# Patient Record
Sex: MALE | Race: WHITE | NOT HISPANIC OR LATINO | Employment: FULL TIME | URBAN - METROPOLITAN AREA
[De-identification: names, ages, dates, MRNs, and addresses within clinical notes are randomized per-mention and may not be internally consistent; named-entity substitution may affect disease eponyms.]

---

## 2017-02-23 RX ORDER — ALPRAZOLAM 1 MG/1
1 TABLET ORAL DAILY PRN
Qty: 30 TABLET | Refills: 0 | Status: SHIPPED | OUTPATIENT
Start: 2017-02-23 | End: 2017-05-19 | Stop reason: SDUPTHER

## 2017-02-23 NOTE — TELEPHONE ENCOUNTER
----- Message from Shadia San sent at 2/23/2017  4:07 PM CST -----  Would like a refill on xanax. Pt uses.  [facility] [address] [place] [address]  Phone: [phone] Fax: [phone]     Please call back at [phone]. Thanks//cdb

## 2017-03-06 ENCOUNTER — OFFICE VISIT (OUTPATIENT)
Dept: FAMILY MEDICINE | Facility: CLINIC | Age: 42
End: 2017-03-06
Payer: COMMERCIAL

## 2017-03-06 VITALS
DIASTOLIC BLOOD PRESSURE: 90 MMHG | WEIGHT: 282.19 LBS | OXYGEN SATURATION: 96 % | TEMPERATURE: 97 F | HEIGHT: 66 IN | SYSTOLIC BLOOD PRESSURE: 134 MMHG | BODY MASS INDEX: 45.35 KG/M2 | RESPIRATION RATE: 18 BRPM | HEART RATE: 116 BPM

## 2017-03-06 DIAGNOSIS — J32.9 SINUSITIS, UNSPECIFIED CHRONICITY, UNSPECIFIED LOCATION: Primary | ICD-10-CM

## 2017-03-06 DIAGNOSIS — R29.898 ARM WEAKNESS: ICD-10-CM

## 2017-03-06 PROCEDURE — 1160F RVW MEDS BY RX/DR IN RCRD: CPT | Mod: S$GLB,,, | Performed by: INTERNAL MEDICINE

## 2017-03-06 PROCEDURE — 99999 PR PBB SHADOW E&M-EST. PATIENT-LVL III: CPT | Mod: PBBFAC,,, | Performed by: INTERNAL MEDICINE

## 2017-03-06 PROCEDURE — 99213 OFFICE O/P EST LOW 20 MIN: CPT | Mod: S$GLB,,, | Performed by: INTERNAL MEDICINE

## 2017-03-06 RX ORDER — OSELTAMIVIR PHOSPHATE 75 MG/1
75 CAPSULE ORAL 2 TIMES DAILY
Qty: 10 CAPSULE | Refills: 0 | Status: SHIPPED | OUTPATIENT
Start: 2017-03-06 | End: 2017-03-11

## 2017-03-06 RX ORDER — DOXYCYCLINE 100 MG/1
100 CAPSULE ORAL 2 TIMES DAILY
Qty: 20 CAPSULE | Refills: 0 | Status: SHIPPED | OUTPATIENT
Start: 2017-03-06 | End: 2017-06-05

## 2017-03-06 RX ORDER — CYCLOBENZAPRINE HCL 10 MG
10 TABLET ORAL NIGHTLY
Qty: 30 TABLET | Refills: 0 | Status: SHIPPED | OUTPATIENT
Start: 2017-03-06 | End: 2018-01-28

## 2017-03-06 RX ORDER — CYCLOBENZAPRINE HCL 10 MG
10 TABLET ORAL NIGHTLY
Qty: 30 TABLET | Refills: 0 | Status: CANCELLED | OUTPATIENT
Start: 2017-03-06

## 2017-03-06 RX ORDER — CIMETIDINE 400 MG/1
400 TABLET, FILM COATED ORAL 2 TIMES DAILY
Qty: 60 TABLET | Refills: 1 | Status: SHIPPED | OUTPATIENT
Start: 2017-03-06 | End: 2018-01-28

## 2017-03-06 RX ORDER — ALPRAZOLAM 1 MG/1
1 TABLET ORAL DAILY PRN
Qty: 30 TABLET | Refills: 0 | Status: CANCELLED | OUTPATIENT
Start: 2017-03-06 | End: 2017-06-13

## 2017-03-06 NOTE — MR AVS SNAPSHOT
[facility]  [address] [facility] [address]  Phone: [phone]                  Sergio Parra   3/6/2017 2:00 PM   Office Visit    Description:  Male : 1975   Provider:  Avila Nicholas MD   Department:  [facility]           Reason for Visit     Sinus Problem           Diagnoses this Visit        Comments    Sinusitis, unspecified chronicity, unspecified location    -  Primary     Arm weakness                To Do List           Goals (5 Years of Data)     None      Follow-Up and Disposition     Return if symptoms worsen or fail to improve.       These Medications        Disp Refills Start End    cimetidine (TAGAMET) 400 MG tablet 60 tablet 1 3/6/2017 3/6/2018    Take 1 tablet (400 mg total) by mouth 2 (two) times daily. - Oral    Pharmacy: [facility] [address] [place] [address] Ph #: [phone]       doxycycline (MONODOX) 100 MG capsule 20 capsule 0 3/6/2017     Take 1 capsule (100 mg total) by mouth 2 (two) times daily. - Oral    Pharmacy: [facility] [address] [place] [address] Ph #: [phone]       cyclobenzaprine (FLEXERIL) 10 MG tablet 30 tablet 0 3/6/2017     Take 1 tablet (10 mg total) by mouth every evening. - Oral    Pharmacy: [facility] [address] [place] [address] Ph #: [phone]       oseltamivir (TAMIFLU) 75 MG capsule 10 capsule 0 3/6/2017 3/11/2017    Take 1 capsule (75 mg total) by mouth 2 (two) times daily. - Oral    Pharmacy: [facility] [address] [place] [address] Ph #: [phone]         [facility]     [facility] On Call Nurse Care Line -  Assistance  Registered nurses in the [facility] On Call Center provide clinical advisement, health education, appointment booking, and other advisory services.  Call for this free service at [phone].             Medications           Message regarding  "Medications     Verify the changes and/or additions to your medication regime listed below are the same as discussed with your clinician today.  If any of these changes or additions are incorrect, please notify your healthcare provider.        START taking these NEW medications        Refills    doxycycline (MONODOX) 100 MG capsule 0    Sig: Take 1 capsule (100 mg total) by mouth 2 (two) times daily.    Class: Normal    Route: Oral    oseltamivir (TAMIFLU) 75 MG capsule 0    Sig: Take 1 capsule (75 mg total) by mouth 2 (two) times daily.    Class: Normal    Route: Oral           Verify that the below list of medications is an accurate representation of the medications you are currently taking.  If none reported, the list may be blank. If incorrect, please contact your healthcare provider. Carry this list with you in case of emergency.           Current Medications     alprazolam (XANAX) 1 MG tablet Take 1 tablet (1 mg total) by mouth daily as needed for Anxiety.    cimetidine (TAGAMET) 400 MG tablet Take 1 tablet (400 mg total) by mouth 2 (two) times daily.    cyclobenzaprine (FLEXERIL) 10 MG tablet Take 1 tablet (10 mg total) by mouth every evening.    hydrocodone-acetaminophen 5-325mg (NORCO) 5-325 mg per tablet Take 1 tablet by mouth daily as needed for Pain.    doxycycline (MONODOX) 100 MG capsule Take 1 capsule (100 mg total) by mouth 2 (two) times daily.    oseltamivir (TAMIFLU) 75 MG capsule Take 1 capsule (75 mg total) by mouth 2 (two) times daily.           Clinical Reference Information           Your Vitals Were     BP Pulse Temp Resp Height Weight    134/90 (BP Location: Left arm, Patient Position: Sitting, BP Method: Manual) 116 96.8 °F (36 °C) (Tympanic) 18 5' 6" (1.676 m) 128 kg (282 lb 3 oz)    SpO2 BMI             96% 45.55 kg/m2         Blood Pressure          Most Recent Value    BP  (!)  134/90      Allergies as of 3/6/2017     No Known Allergies      Immunizations Administered on Date of " Encounter - 3/6/2017     None      [facility] Sign-Up     Activating your [facility] account is as easy as 1-2-3!     1) Visit [email or web address], select Sign Up Now, enter this activation code and your date of birth, then select Next.  [email or web address]  Expires: 4/20/2017  2:12 PM      2) Create a username and password to use when you visit [facility] in the future and select a security question in case you lose your password and select Next.    3) Enter your e-mail address and click Sign Up!    Additional Information  If you have questions, please e-mail [email or web address] or call [phone] to talk to our [facility] staff. Remember, [facility] is NOT to be used for urgent needs. For medical emergencies, dial 911.         Language Assistance Services     ATTENTION: Language assistance services are available, free of charge. Please call [phone].      ATENCIÓN: Si habla español, tiene a seay disposición servicios gratuitos de asistencia lingüística. Llame al [phone].     ALAYNA Ý: N?u b?n nói Ti?ng Vi?t, có các d?ch v? h? tr? ngôn ng? mi?n phí dành cho b?n. G?i s? [phone].         [facility] complies with applicable Federal civil rights laws and does not discriminate on the basis of race, color, national origin, age, disability, or sex.

## 2017-03-06 NOTE — PROGRESS NOTES
Subjective:       Patient ID: Sergio Parra is a 41 y.o. male.    Chief Complaint: Sinus Problem  - 2 weeks---sinus congestion-productive cough--------  HPI  Review of Systems   Constitutional: Negative for chills and fever.   HENT: Positive for congestion, postnasal drip and rhinorrhea. Negative for sore throat.    Respiratory: Positive for cough. Negative for apnea, choking, chest tightness, shortness of breath, wheezing and stridor.    Cardiovascular: Negative for chest pain, palpitations and leg swelling.   Gastrointestinal: Negative for abdominal pain.   Genitourinary: Negative.    Neurological: Negative.    Hematological: Negative.    Psychiatric/Behavioral: Negative.        Objective:      Physical Exam   Constitutional: He is oriented to person, place, and time. He appears well-developed and well-nourished. No distress.   HENT:   Head: Normocephalic and atraumatic.   Mouth/Throat: No oropharyngeal exudate.   Eyes: Pupils are equal, round, and reactive to light. No scleral icterus.   Neck: Normal range of motion. Neck supple. Carotid bruit is not present.   Cardiovascular: Normal rate, regular rhythm, normal heart sounds and intact distal pulses.    Pulmonary/Chest: Effort normal and breath sounds normal. No respiratory distress. He has no wheezes. He has no rales. He exhibits no tenderness.   Abdominal: Soft. Bowel sounds are normal.   Musculoskeletal: Normal range of motion. He exhibits no edema or tenderness.   Neurological: He is alert and oriented to person, place, and time.   Skin: Skin is warm and dry. No rash noted. He is not diaphoretic. No erythema. No pallor.   Psychiatric: He has a normal mood and affect. His behavior is normal. Judgment and thought content normal.   Nursing note and vitals reviewed.      Assessment:       1. Sinusitis, unspecified chronicity, unspecified location    2. Arm weakness-rotator cuff weakness        Plan:        doxy 100 mg bid x 10 days. Cough med  prn----------call if persists.

## 2017-03-09 ENCOUNTER — CLINICAL SUPPORT (OUTPATIENT)
Dept: FAMILY MEDICINE | Facility: CLINIC | Age: 42
End: 2017-03-09
Payer: COMMERCIAL

## 2017-03-09 PROCEDURE — 99999 PR PBB SHADOW E&M-EST. PATIENT-LVL I: CPT | Mod: PBBFAC,,,

## 2017-03-09 PROCEDURE — 96372 THER/PROPH/DIAG INJ SC/IM: CPT | Mod: S$GLB,,, | Performed by: INTERNAL MEDICINE

## 2017-03-09 RX ADMIN — BETAMETHASONE SODIUM PHOSPHATE AND BETAMETHASONE ACETATE 12 MG: 3; 3 INJECTION, SUSPENSION INTRA-ARTICULAR; INTRALESIONAL; INTRAMUSCULAR; SOFT TISSUE at 01:03

## 2017-03-09 NOTE — PROGRESS NOTES
Allergies and Medications reviewed.  Celestone injection given as ordered by Avila Nicholas MD.   See MAR.

## 2017-05-19 NOTE — TELEPHONE ENCOUNTER
----- Message from Tawanna Quiros sent at 5/19/2017 12:39 PM CDT -----  Call pt at [phone]//regarding a refill on xanax//charity farley

## 2017-05-22 RX ORDER — ALPRAZOLAM 1 MG/1
1 TABLET ORAL DAILY PRN
Qty: 30 TABLET | Refills: 0 | Status: SHIPPED | OUTPATIENT
Start: 2017-05-22 | End: 2017-08-11 | Stop reason: SDUPTHER

## 2017-06-05 ENCOUNTER — OFFICE VISIT (OUTPATIENT)
Dept: FAMILY MEDICINE | Facility: CLINIC | Age: 42
End: 2017-06-05
Payer: COMMERCIAL

## 2017-06-05 VITALS
BODY MASS INDEX: 44.82 KG/M2 | HEART RATE: 94 BPM | WEIGHT: 278.88 LBS | DIASTOLIC BLOOD PRESSURE: 82 MMHG | RESPIRATION RATE: 18 BRPM | HEIGHT: 66 IN | OXYGEN SATURATION: 97 % | TEMPERATURE: 97 F | SYSTOLIC BLOOD PRESSURE: 138 MMHG

## 2017-06-05 DIAGNOSIS — J32.9 SINUSITIS, UNSPECIFIED CHRONICITY, UNSPECIFIED LOCATION: ICD-10-CM

## 2017-06-05 DIAGNOSIS — F90.1 ADHD (ATTENTION DEFICIT HYPERACTIVITY DISORDER), PREDOMINANTLY HYPERACTIVE IMPULSIVE TYPE: Primary | ICD-10-CM

## 2017-06-05 LAB
CTP QC/QA: YES
S PYO RRNA THROAT QL PROBE: NEGATIVE

## 2017-06-05 PROCEDURE — 99999 PR PBB SHADOW E&M-EST. PATIENT-LVL III: CPT | Mod: PBBFAC,,, | Performed by: INTERNAL MEDICINE

## 2017-06-05 PROCEDURE — 87880 STREP A ASSAY W/OPTIC: CPT | Mod: QW,S$GLB,, | Performed by: INTERNAL MEDICINE

## 2017-06-05 PROCEDURE — 99214 OFFICE O/P EST MOD 30 MIN: CPT | Mod: 25,S$GLB,, | Performed by: INTERNAL MEDICINE

## 2017-06-05 RX ORDER — LISDEXAMFETAMINE DIMESYLATE CAPSULES 20 MG/1
20 CAPSULE ORAL EVERY MORNING
Qty: 30 CAPSULE | Refills: 0 | Status: SHIPPED | OUTPATIENT
Start: 2017-06-05 | End: 2017-07-20 | Stop reason: SDUPTHER

## 2017-06-05 RX ORDER — AZITHROMYCIN 250 MG/1
TABLET, FILM COATED ORAL
Qty: 6 TABLET | Refills: 0 | Status: SHIPPED | OUTPATIENT
Start: 2017-06-05 | End: 2017-09-12

## 2017-06-05 NOTE — PROGRESS NOTES
Subjective:       Patient ID: Sergio Parra is a 41 y.o. male.    Chief Complaint: ADHD  -----difficulty concentrating at work------focusing, completing task-------  HPI  Review of Systems   Constitutional: Negative for chills and fever.   HENT: Positive for congestion, rhinorrhea, sinus pressure and sore throat.    Respiratory: Negative for apnea, cough, choking, chest tightness, shortness of breath, wheezing and stridor.    Cardiovascular: Negative for chest pain, palpitations and leg swelling.   Gastrointestinal: Negative for abdominal pain.   Genitourinary: Negative.    Neurological: Negative.    Psychiatric/Behavioral: Negative for agitation, behavioral problems and confusion.       Objective:      Physical Exam   Constitutional: He is oriented to person, place, and time. He appears well-developed and well-nourished. No distress.   HENT:   Head: Normocephalic and atraumatic.   Mouth/Throat: No oropharyngeal exudate.   Eyes: Pupils are equal, round, and reactive to light.   Neck: Normal range of motion. Neck supple. Carotid bruit is not present.   Cardiovascular: Normal rate, regular rhythm, normal heart sounds and intact distal pulses.    Pulmonary/Chest: Effort normal and breath sounds normal. No respiratory distress. He has no wheezes. He has no rales. He exhibits no tenderness.   Abdominal: Soft. Bowel sounds are normal. He exhibits no distension. There is no tenderness. There is no rebound and no guarding.   Musculoskeletal: Normal range of motion. He exhibits no edema or tenderness.   Neurological: He is alert and oriented to person, place, and time.   Skin: Skin is warm and dry. No rash noted. He is not diaphoretic. No erythema. No pallor.   Psychiatric: He has a normal mood and affect. His behavior is normal. Judgment and thought content normal.   Nursing note and vitals reviewed.      Assessment:       1. ADHD (attention deficit hyperactivity disorder), predominantly hyperactive impulsive type     2. Sinusitis, unspecified chronicity, unspecified location        Plan:         trial vyvanse 20 mg a day.      neuropysch consult.          z-pack/mucinex-------call if persists.            F/u 1 month.

## 2017-06-07 ENCOUNTER — TELEPHONE (OUTPATIENT)
Dept: FAMILY MEDICINE | Facility: CLINIC | Age: 42
End: 2017-06-07

## 2017-06-07 NOTE — TELEPHONE ENCOUNTER
----- Message from Patricia Dykes sent at 6/6/2017  4:46 PM CDT -----  [facility] ( [facility] ) is requesting a call from nurse in regards to a prior authorization.            Please call [facility] ( [facility] ) back at [phone]

## 2017-06-13 ENCOUNTER — TELEPHONE (OUTPATIENT)
Dept: FAMILY MEDICINE | Facility: CLINIC | Age: 42
End: 2017-06-13

## 2017-06-13 NOTE — TELEPHONE ENCOUNTER
----- Message from Amandaмарина Hu sent at 6/13/2017  1:57 PM CDT -----  Contact: pt  States this is his 4th time to leave a message, his insurance needs notes on fill before they will cover his medicine. Please call pt at [phone]. Thank you

## 2017-06-13 NOTE — TELEPHONE ENCOUNTER
Advised pt that we have not received any calls or information from the insurance company about this medication. Provided a phone and fax number and states that the patient needed notes from Dr Nicholas. Will fax over NOW.

## 2017-06-14 ENCOUNTER — TELEPHONE (OUTPATIENT)
Dept: FAMILY MEDICINE | Facility: CLINIC | Age: 42
End: 2017-06-14

## 2017-06-14 NOTE — TELEPHONE ENCOUNTER
----- Message from Bobbi Mcguire sent at 6/14/2017 12:36 PM CDT -----  Contact: Pt  Pt is requesting to speak to the nurse. Pt's rx for Vyvanse requires prior authorization. pt states that he called several times previously, but no one has contacted him about it. Pls call pt back at [phone].

## 2017-06-15 ENCOUNTER — TELEPHONE (OUTPATIENT)
Dept: FAMILY MEDICINE | Facility: CLINIC | Age: 42
End: 2017-06-15

## 2017-06-15 NOTE — TELEPHONE ENCOUNTER
----- Message from Darshana Castillo sent at 6/15/2017  8:14 AM CDT -----  Contact: pt  Pt is requesting a call back and he is going on vacation tomorrow, and he would like his medication today,,, pt said is URGENT he gets a call back today,, medication vivance,, please call pt back at [phone]

## 2017-06-15 NOTE — TELEPHONE ENCOUNTER
----- Message from Enriqueta Witt sent at 6/15/2017  8:51 AM CDT -----  Contact: Pt   Pt called and stated he needed to speak to the nurse. He stated that his copy for the  Vyvance is $200 but he was told by the pharmacy that the doctor can give him a coupon that will allow him to get the medication for $15. He can be reached at [phone].    Thanks,  TF

## 2017-07-06 ENCOUNTER — TELEPHONE (OUTPATIENT)
Dept: FAMILY MEDICINE | Facility: CLINIC | Age: 42
End: 2017-07-06

## 2017-07-06 NOTE — TELEPHONE ENCOUNTER
Pt states he really thinks the medication is working for him. States you advised him that he'd need an evaluation done, but he don't think it is necessary, so he wants to know what he would have to do before medication runs out. Wants to speak directly to you about this.

## 2017-07-06 NOTE — TELEPHONE ENCOUNTER
----- Message from Shadia San sent at 7/6/2017  8:26 AM CDT -----  Would like to speak to nurse about medication. Please call back at [phone]. thanks

## 2017-07-07 ENCOUNTER — TELEPHONE (OUTPATIENT)
Dept: FAMILY MEDICINE | Facility: CLINIC | Age: 42
End: 2017-07-07

## 2017-07-07 DIAGNOSIS — B35.1 ONYCHOMYCOSIS OF TOENAIL: ICD-10-CM

## 2017-07-07 RX ORDER — TERBINAFINE HYDROCHLORIDE 250 MG/1
250 TABLET ORAL DAILY
Qty: 30 TABLET | Refills: 2 | Status: CANCELLED | OUTPATIENT
Start: 2017-07-07 | End: 2017-08-06

## 2017-07-07 NOTE — TELEPHONE ENCOUNTER
----- Message from Shadia San sent at 7/7/2017  2:34 PM CDT -----  Returning nurse call. Please call back at [phone]. thanks

## 2017-07-07 NOTE — TELEPHONE ENCOUNTER
Pt was notified that Dr Nicholas was out of the office, no one would sign the RX being that he had not been seen and he did not want to come in for an appt. Advised I routed it back to the physician.

## 2017-07-07 NOTE — TELEPHONE ENCOUNTER
----- Message from Teresa Newsome sent at 7/7/2017 12:44 PM CDT -----  Contact: Patient  Patient called to speak with the nurse; he sent a message earlier about wanting Lamisil. He wants a call back before something is called in.    [facility] [address] [place] [address] [address] [place] [address]  [address] [place] [address]  Phone: [phone] Fax: [phone]    He can be contacted at [phone].    Thanks,  Teresa

## 2017-07-07 NOTE — TELEPHONE ENCOUNTER
----- Message from Annabel Johnson sent at 7/7/2017  8:39 AM CDT -----  has toenail fungus, pls call in lamisil this time..[phone] (home)     [id number] [place] [address]  [address] [facility] [address]  Phone: [phone] Fax: [phone]

## 2017-07-08 ENCOUNTER — NURSE TRIAGE (OUTPATIENT)
Dept: ADMINISTRATIVE | Facility: CLINIC | Age: 42
End: 2017-07-08

## 2017-07-08 NOTE — TELEPHONE ENCOUNTER
"  Reason for Disposition   Caller requesting a NON-URGENT new prescription or refill and triager unable to refill per unit policy    Answer Assessment - Initial Assessment Questions  1. SYMPTOMS: "Do you have any symptoms?"      "severe nasal congestion"  2. SEVERITY: If symptoms are present, ask "Are they mild, moderate or severe?"      Patient states "severe"    Protocols used: ST MEDICATION QUESTION CALL-A-AH    "

## 2017-07-10 ENCOUNTER — TELEPHONE (OUTPATIENT)
Dept: FAMILY MEDICINE | Facility: CLINIC | Age: 42
End: 2017-07-10

## 2017-07-10 RX ORDER — AZITHROMYCIN 250 MG/1
TABLET, FILM COATED ORAL
Qty: 6 TABLET | Refills: 0 | Status: SHIPPED | OUTPATIENT
Start: 2017-07-10 | End: 2017-09-12

## 2017-07-10 RX ORDER — TERBINAFINE HYDROCHLORIDE 250 MG/1
250 TABLET ORAL DAILY
Qty: 30 TABLET | Refills: 0 | Status: SHIPPED | OUTPATIENT
Start: 2017-07-10 | End: 2017-08-11 | Stop reason: SDUPTHER

## 2017-07-10 NOTE — TELEPHONE ENCOUNTER
----- Message from Yvonne Mcgowan sent at 7/10/2017 11:25 AM CDT -----  Contact: pt  Pt states he has a sinus infection and would like to know if the Dr would send in a script for a ZPack for him to the [facility].  Please call pt at [phone].

## 2017-07-20 RX ORDER — LISDEXAMFETAMINE DIMESYLATE CAPSULES 20 MG/1
20 CAPSULE ORAL EVERY MORNING
Qty: 30 CAPSULE | Refills: 0 | Status: SHIPPED | OUTPATIENT
Start: 2017-07-20 | End: 2017-08-23 | Stop reason: SDUPTHER

## 2017-08-11 ENCOUNTER — TELEPHONE (OUTPATIENT)
Dept: FAMILY MEDICINE | Facility: CLINIC | Age: 42
End: 2017-08-11

## 2017-08-11 RX ORDER — ALPRAZOLAM 1 MG/1
1 TABLET ORAL DAILY PRN
Qty: 30 TABLET | Refills: 0 | Status: SHIPPED | OUTPATIENT
Start: 2017-08-11 | End: 2017-11-30 | Stop reason: SDUPTHER

## 2017-08-11 RX ORDER — TERBINAFINE HYDROCHLORIDE 250 MG/1
250 TABLET ORAL DAILY
Qty: 30 TABLET | Refills: 0 | Status: SHIPPED | OUTPATIENT
Start: 2017-08-11 | End: 2017-09-10

## 2017-08-11 NOTE — TELEPHONE ENCOUNTER
----- Message from Bouchra Quang sent at 8/11/2017  9:48 AM CDT -----  Contact: [phone]  Patient would like a call back once medication has been called in.    1. What is the name of the medication you are requesting?lamasil/xanax  2. What is the dose? N/a-10mg  3. How do you take the medication? Orally, topically, etc? orally  4. How often do you take this medication? Once a day/once a week  5. Do you need a 30 day or 90 day supply? 30 day  6. How many refills are you requesting? n/a  7. What is your preferred pharmacy and location of the pharmacy?   [facility] [address] [place] [address] [id number] [place] [address]  [address] [place] [address]  Phone: [phone] Fax: [phone]    [phone]

## 2017-08-23 ENCOUNTER — TELEPHONE (OUTPATIENT)
Dept: FAMILY MEDICINE | Facility: CLINIC | Age: 42
End: 2017-08-23

## 2017-08-23 RX ORDER — LISDEXAMFETAMINE DIMESYLATE 30 MG/1
30 CAPSULE ORAL EVERY MORNING
Qty: 30 CAPSULE | Refills: 0 | Status: SHIPPED | OUTPATIENT
Start: 2017-08-23 | End: 2017-09-27 | Stop reason: SDUPTHER

## 2017-08-23 NOTE — TELEPHONE ENCOUNTER
----- Message from Tawanna Quiros sent at 8/23/2017 11:42 AM CDT -----  Call pt at [phone]//regarding a refill for vyvanse /requesting to be increase to  40 mg currently at 20 mg//thks ht

## 2017-08-23 NOTE — TELEPHONE ENCOUNTER
----- Message from Kirill Mckeon sent at 8/23/2017  1:33 PM CDT -----  Contact: pt   States he's calling rg and can be reached at [phone]//thanks/dbw     1. What is the name of the medication you are requesting? vyvanse   2. What is the dose? 20mg   would like to increase to 40mg  3. How do you take the medication? Orally, topically, etc? Orally   4. How often do you take this medication? Once daily   5. Do you need a 30 day or 90 day supply? 30 day  6. How many refills are you requesting? None   7. What is your preferred pharmacy and location of the pharmacy?   8. Who can we contact with further questions? Pt     [facility] [address] [place] [address] [id number] [place] [address]  [address] [place] [address]  Phone: [phone] Fax: [phone]

## 2017-09-12 ENCOUNTER — TELEPHONE (OUTPATIENT)
Dept: FAMILY MEDICINE | Facility: CLINIC | Age: 42
End: 2017-09-12

## 2017-09-12 DIAGNOSIS — Z00.00 ROUTINE ADULT HEALTH MAINTENANCE: Primary | ICD-10-CM

## 2017-09-12 NOTE — TELEPHONE ENCOUNTER
----- Message from Shadia San sent at 9/12/2017  9:35 AM CDT -----  Would like a refill on toe nail meds. Pt uses.  [facility] [address] [place] [address]  [address] [place] [address]  Phone: [phone] Fax: [phone]    Please call back at [phone]. thanks

## 2017-09-13 ENCOUNTER — TELEPHONE (OUTPATIENT)
Dept: FAMILY MEDICINE | Facility: CLINIC | Age: 42
End: 2017-09-13

## 2017-09-13 NOTE — TELEPHONE ENCOUNTER
----- Message from Teresa Newsome sent at 9/13/2017  8:51 AM CDT -----  Contact: Patient  Patient called and stated he left a message yesterday about a refill and no one called him back. I advised him the nurse tried to call about 2 times yesterday. Please call him at [phone].    Thanks,  Teresa

## 2017-09-27 ENCOUNTER — TELEPHONE (OUTPATIENT)
Dept: FAMILY MEDICINE | Facility: CLINIC | Age: 42
End: 2017-09-27

## 2017-09-27 RX ORDER — LISDEXAMFETAMINE DIMESYLATE 40 MG/1
40 CAPSULE ORAL EVERY MORNING
Qty: 30 CAPSULE | Refills: 0 | Status: SHIPPED | OUTPATIENT
Start: 2017-09-27 | End: 2017-10-31 | Stop reason: SDUPTHER

## 2017-09-27 NOTE — TELEPHONE ENCOUNTER
----- Message from Arline Chung sent at 9/27/2017  8:46 AM CDT -----  Contact: Pt  Pt needs a refill on VYVANSE, but wants to see if it can be bumped up to the 40 Mg. Please give pt a call at ..[phone] (home)           ..  [address] [place] [address]  [address] [facility] [address]  Phone: [phone] Fax: [phone]

## 2017-10-31 ENCOUNTER — TELEPHONE (OUTPATIENT)
Dept: FAMILY MEDICINE | Facility: CLINIC | Age: 42
End: 2017-10-31

## 2017-10-31 RX ORDER — LISDEXAMFETAMINE DIMESYLATE 40 MG/1
40 CAPSULE ORAL EVERY MORNING
Qty: 30 CAPSULE | Refills: 0 | Status: SHIPPED | OUTPATIENT
Start: 2017-10-31 | End: 2017-11-30 | Stop reason: SDUPTHER

## 2017-10-31 NOTE — TELEPHONE ENCOUNTER
----- Message from Darshana Castillo sent at 10/31/2017  7:41 AM CDT -----  Contact: pt   Pt needs a refill jessica,,,, Pharmacy is [facility] [address],,,, please geovanna pt back at    [phone]

## 2017-11-30 RX ORDER — LISDEXAMFETAMINE DIMESYLATE 40 MG/1
40 CAPSULE ORAL EVERY MORNING
Qty: 30 CAPSULE | Refills: 0 | Status: SHIPPED | OUTPATIENT
Start: 2017-11-30 | End: 2017-12-29 | Stop reason: SDUPTHER

## 2017-11-30 RX ORDER — ALPRAZOLAM 1 MG/1
1 TABLET ORAL DAILY PRN
Qty: 30 TABLET | Refills: 0 | Status: SHIPPED | OUTPATIENT
Start: 2017-11-30 | End: 2018-08-13 | Stop reason: SDUPTHER

## 2017-11-30 NOTE — TELEPHONE ENCOUNTER
----- Message from Kirill Mckeon sent at 11/30/2017  7:27 AM CST -----  Contact: pt   States he's calling to get a refill and can be reached at [phone]//thanks/dbw     1. What is the name of the medication you are requesting? xanex  2. What is the dose? 10mg  3. How do you take the medication? Orally, topically, etc? Orally   4. How often do you take this medication? As needed   5. Do you need a 30 day or 90 day supply? 30 days  6. How many refills are you requesting?   7. What is your preferred pharmacy and location of the pharmacy?   8. Who can we contact with further questions? Pt     1. What is the name of the medication you are requesting? vyvance  2. What is the dose? 40 mg but wants to be upped to 50 mg  3. How do you take the medication? Orally, topically, etc? Orally   4. How often do you take this medication? Once daily   5. Do you need a 30 day or 90 day supply? 30 days  6. How many refills are you requesting?   7. What is your preferred pharmacy and location of the pharmacy?   8. Who can we contact with further questions? Pt       [facility] [address] [place] [address] [address] [place] [address]  [address] [place] [address]  Phone: [phone] Fax: [phone]

## 2017-12-29 RX ORDER — LISDEXAMFETAMINE DIMESYLATE 40 MG/1
40 CAPSULE ORAL EVERY MORNING
Qty: 30 CAPSULE | Refills: 0 | Status: SHIPPED | OUTPATIENT
Start: 2017-12-29 | End: 2018-01-29 | Stop reason: SDUPTHER

## 2017-12-29 NOTE — TELEPHONE ENCOUNTER
----- Message from Enriqueta Witt sent at 12/29/2017  9:48 AM CST -----  Contact: Pt   Pt called and stated he needed to speak to the nurse. He stated he needs a refill:    1. What is the name of the medication you are requesting? Vyvanse   2. What is the dose? 50 mg   3. How do you take the medication? Orally, topically, etc? Orally   4. How often do you take this medication?   5. Do you need a 30 day or 90 day supply? 30 day   6. How many refills are you requesting?   7. What is your preferred pharmacy and location of the pharmacy?   [address] [address]  Phone: [phone] Fax: [phone]    [facility] [place] [place] - [address] [place] [address]  Phone: [phone] Fax: [phone]    8. Who can we contact with further questions? He can be reached at [phone].    Thanks,  TF

## 2018-01-26 ENCOUNTER — TELEPHONE (OUTPATIENT)
Dept: FAMILY MEDICINE | Facility: CLINIC | Age: 43
End: 2018-01-26

## 2018-01-26 NOTE — TELEPHONE ENCOUNTER
----- Message from Teresa Newsome sent at 1/26/2018  2:05 PM CST -----  Contact: Patient  Patient called to request a refill. He is requesting an increase in his dosage of Vyvanse. Please call him at [phone].    Thanks,  Teresa

## 2018-01-29 RX ORDER — LISDEXAMFETAMINE DIMESYLATE 50 MG/1
50 CAPSULE ORAL EVERY MORNING
Qty: 30 CAPSULE | Refills: 0 | Status: SHIPPED | OUTPATIENT
Start: 2018-01-29 | End: 2018-03-07 | Stop reason: SDUPTHER

## 2018-03-07 RX ORDER — LISDEXAMFETAMINE DIMESYLATE 50 MG/1
50 CAPSULE ORAL EVERY MORNING
Qty: 30 CAPSULE | Refills: 0 | Status: SHIPPED | OUTPATIENT
Start: 2018-03-07 | End: 2018-04-09 | Stop reason: SDUPTHER

## 2018-03-07 RX ORDER — LISDEXAMFETAMINE DIMESYLATE 50 MG/1
50 CAPSULE ORAL EVERY MORNING
Qty: 30 CAPSULE | Refills: 0 | Status: SHIPPED | OUTPATIENT
Start: 2018-03-07 | End: 2018-03-07 | Stop reason: SDUPTHER

## 2018-03-07 NOTE — TELEPHONE ENCOUNTER
Canceled at [facility] on [address]/ Can you sent to [facility] on [address]

## 2018-03-07 NOTE — TELEPHONE ENCOUNTER
----- Message from Enriqueta Witt sent at 3/7/2018 12:41 PM CST -----  Contact: Pt   Pt called and stated he needed to speak to the nurse. He stated that [facility] is out of the Vyvanse medication and he needs a prescription sent to   [facility] [address] [facility].   Rossana Sethi     He can be reached at [phone].    Thanks,  Tf

## 2018-03-07 NOTE — TELEPHONE ENCOUNTER
----- Message from Valdemar Crisostomo sent at 3/7/2018 10:37 AM CST -----  Contact: pt   Pt is requesting a refill.    /// [phone] (home)       1. What is the name of the medication you are requesting? vyvanse  2. What is the dose? 50 mg  3. How do you take the medication? Orally, topically, etc? Orally   4. How often do you take this medication? 1 daily   5. Do you need a 30 day or 90 day supply? 30  6. How many refills are you requesting? Per   7. What is your preferred pharmacy and location of the pharmacy?     [facility] [address] [place] [address]  [address] [place] [address]  Phone: [phone] Fax: [phone]    8. Who can we contact with further questions? pt

## 2018-04-09 RX ORDER — LISDEXAMFETAMINE DIMESYLATE 50 MG/1
50 CAPSULE ORAL EVERY MORNING
Qty: 30 CAPSULE | Refills: 0 | Status: SHIPPED | OUTPATIENT
Start: 2018-04-09 | End: 2018-05-09 | Stop reason: SDUPTHER

## 2018-04-09 NOTE — TELEPHONE ENCOUNTER
----- Message from Patricia Dykes sent at 4/9/2018  7:24 AM CDT -----  ..1. What is the name of the medication you are requesting? vyvanse  2. What is the dose? 50 mg   3. How do you take the medication? Orally, topically, etc? Orally   4. How often do you take this medication? Daily   5. Do you need a 30 day or 90 day supply? 30  6. How many refills are you requesting? 1  7. What is your preferred pharmacy and location of the pharmacy?       [address] [place] - [address] [facility]  [address] [facility] [place] [address]  Phone: [phone] Fax: [phone]    8. Who can we contact with further questions? Please call pt back at [phone]

## 2018-05-09 RX ORDER — LISDEXAMFETAMINE DIMESYLATE 50 MG/1
50 CAPSULE ORAL EVERY MORNING
Qty: 30 CAPSULE | Refills: 0 | Status: SHIPPED | OUTPATIENT
Start: 2018-05-09 | End: 2018-06-08 | Stop reason: SDUPTHER

## 2018-05-09 NOTE — TELEPHONE ENCOUNTER
----- Message from Teresa Newsome sent at 5/9/2018 11:13 AM CDT -----  Contact: Patient  Patient called to request a refill on his medication. [facility] as it was sent to another one last time and it was an inconvienence for him.     1. Vyanse 50 mg - 1 tablet daily - 30 day    [facility] [address] [place] [address] [address] [place] [address]  [address] [facility] [address]  Phone: [phone] Fax: [phone]    He can be contacted at [phone].    Thanks,  Teresa

## 2018-06-08 ENCOUNTER — TELEPHONE (OUTPATIENT)
Dept: FAMILY MEDICINE | Facility: CLINIC | Age: 43
End: 2018-06-08

## 2018-06-08 RX ORDER — LISDEXAMFETAMINE DIMESYLATE 50 MG/1
50 CAPSULE ORAL EVERY MORNING
Qty: 30 CAPSULE | Refills: 0 | Status: SHIPPED | OUTPATIENT
Start: 2018-06-08 | End: 2018-07-13 | Stop reason: SDUPTHER

## 2018-06-08 NOTE — TELEPHONE ENCOUNTER
----- Message from Madyson Keller sent at 6/8/2018 10:04 AM CDT -----  Contact: Sergio [phone]  Patient is requesting a refill Vyvance 50mg

## 2018-06-08 NOTE — TELEPHONE ENCOUNTER
----- Message from Madyson Keller sent at 6/8/2018 10:04 AM CDT -----  Contact: Sergio [phone]  Patient is requesting a refill Vyvance 50mg

## 2018-07-13 ENCOUNTER — TELEPHONE (OUTPATIENT)
Dept: FAMILY MEDICINE | Facility: CLINIC | Age: 43
End: 2018-07-13

## 2018-07-13 NOTE — TELEPHONE ENCOUNTER
----- Message from Amanda Hu sent at 7/13/2018  3:39 PM CDT -----  Contact: pt  States he would like to see if he can get 60 mg.     1. What is the name of the medication you are requesting? vyvanse  2. What is the dose? 50 mg  3. How do you take the medication? Orally, topically, etc? orally  4. How often do you take this medication? Once a day   5. Do you need a 30 day or 90 day supply? ?  6. How many refills are you requesting? ?  7. What is your preferred pharmacy and location of the pharmacy?   [facility] [address] [place] [address]  [id number] [place] [address]  Phone: [phone] Fax: [phone]  8. Who can we contact with further questions? Pt at [phone]  Thank you

## 2018-07-13 NOTE — TELEPHONE ENCOUNTER
----- Message from Amanda Hu sent at 7/13/2018  3:39 PM CDT -----  Contact: pt  States he would like to see if he can get 60 mg.     1. What is the name of the medication you are requesting? vyvanse  2. What is the dose? 50 mg  3. How do you take the medication? Orally, topically, etc? orally  4. How often do you take this medication? Once a day   5. Do you need a 30 day or 90 day supply? ?  6. How many refills are you requesting? ?  7. What is your preferred pharmacy and location of the pharmacy?   [facility] [address] [place] [address]  [id number] [place] [address]  Phone: [phone] Fax: [phone]  8. Who can we contact with further questions? Pt at [phone]  Thank you

## 2018-07-16 RX ORDER — LISDEXAMFETAMINE DIMESYLATE 50 MG/1
50 CAPSULE ORAL EVERY MORNING
Qty: 30 CAPSULE | Refills: 0 | Status: SHIPPED | OUTPATIENT
Start: 2018-07-16 | End: 2018-08-13 | Stop reason: SDUPTHER

## 2018-07-18 ENCOUNTER — TELEPHONE (OUTPATIENT)
Dept: FAMILY MEDICINE | Facility: CLINIC | Age: 43
End: 2018-07-18

## 2018-07-18 NOTE — TELEPHONE ENCOUNTER
----- Message from Jess Floyd sent at 7/18/2018  8:07 AM CDT -----  Contact: self [phone]  States that he called last week for a refill on vyvanse and he has not received his refill and no one has called him. Please call back at [phone]//thank you acc

## 2018-07-18 NOTE — TELEPHONE ENCOUNTER
Verified with pharmacy the prescription was received and will be filled. Called pt to notify, no answer or voicemail.

## 2018-08-13 ENCOUNTER — OFFICE VISIT (OUTPATIENT)
Dept: FAMILY MEDICINE | Facility: CLINIC | Age: 43
End: 2018-08-13
Payer: COMMERCIAL

## 2018-08-13 VITALS
HEIGHT: 66 IN | BODY MASS INDEX: 43.4 KG/M2 | OXYGEN SATURATION: 97 % | DIASTOLIC BLOOD PRESSURE: 92 MMHG | HEART RATE: 125 BPM | WEIGHT: 270.06 LBS | TEMPERATURE: 94 F | SYSTOLIC BLOOD PRESSURE: 118 MMHG

## 2018-08-13 DIAGNOSIS — H10.9 CONJUNCTIVITIS OF BOTH EYES, UNSPECIFIED CONJUNCTIVITIS TYPE: Primary | ICD-10-CM

## 2018-08-13 PROCEDURE — 99999 PR PBB SHADOW E&M-EST. PATIENT-LVL III: CPT | Mod: PBBFAC,,, | Performed by: REGISTERED NURSE

## 2018-08-13 PROCEDURE — 99213 OFFICE O/P EST LOW 20 MIN: CPT | Mod: S$GLB,,, | Performed by: REGISTERED NURSE

## 2018-08-13 PROCEDURE — 3008F BODY MASS INDEX DOCD: CPT | Mod: CPTII,S$GLB,, | Performed by: REGISTERED NURSE

## 2018-08-13 RX ORDER — LISDEXAMFETAMINE DIMESYLATE 50 MG/1
50 CAPSULE ORAL EVERY MORNING
Qty: 30 CAPSULE | Refills: 0 | Status: CANCELLED | OUTPATIENT
Start: 2018-08-13

## 2018-08-13 RX ORDER — CROMOLYN SODIUM 40 MG/ML
1-2 SOLUTION/ DROPS OPHTHALMIC 4 TIMES DAILY
Qty: 10 ML | Refills: 2 | Status: SHIPPED | OUTPATIENT
Start: 2018-08-13 | End: 2018-12-27

## 2018-08-13 RX ORDER — ALPRAZOLAM 1 MG/1
1 TABLET ORAL DAILY PRN
Qty: 30 TABLET | Refills: 0 | Status: CANCELLED | OUTPATIENT
Start: 2018-08-13 | End: 2018-11-20

## 2018-08-13 NOTE — PROGRESS NOTES
"Subjective:       Patient ID: Sergio Parra is a 43 y.o. male.    Chief Complaint: Eye Problem      HPI    Sergio is here today with c/o RT eye issue since yesterday.  Reports bilateral red eyes, drainage and photosensitivity.  Triggers unknown.  Saline washes not helping.      Review of Systems   Constitutional: Negative for chills and fever.   Eyes: Positive for photophobia, discharge and redness. Negative for itching (irritation).   Respiratory: Negative.    Cardiovascular: Negative.    Neurological: Negative.          Patient Active Problem List   Diagnosis    Hypercholesteremia    Anxiety    Morbid obesity with BMI of 45.0-49.9, adult    Myofascial pain    Arm weakness-rotator cuff weakness    Chronic neck pain    Chronic back pain       Past medical, surgical, family and social histories have been reviewed today.        Objective:     Vitals:    08/13/18 0905   BP: (!) 118/92   Pulse: (!) 125   Temp: (!) 94.1 °F (34.5 °C)   TempSrc: Tympanic   SpO2: 97%   Weight: 122.5 kg (270 lb 1 oz)   Height: 5' 6" (1.676 m)       Physical Exam   Constitutional: He is oriented to person, place, and time. He appears well-developed and well-nourished.   HENT:   Head: Normocephalic and atraumatic.   Right Ear: Tympanic membrane and ear canal normal.   Left Ear: Tympanic membrane and ear canal normal.   Nose: Nose normal.   Mouth/Throat: Oropharynx is clear and moist and mucous membranes are normal.   Eyes: EOM are normal. Pupils are equal, round, and reactive to light. Right eye exhibits discharge (clear B watery d/c). Right eye exhibits no exudate and no hordeolum. Left eye exhibits discharge. Left eye exhibits no exudate and no hordeolum. Right conjunctiva is injected. Right conjunctiva has no hemorrhage. Left conjunctiva is injected. Left conjunctiva has no hemorrhage.   Neurological: He is alert and oriented to person, place, and time.         Medication List with Changes/Refills   New Medications    CROMOLYN " (OPTICROM) 4 % OPHTHALMIC SOLUTION    Place 1-2 drops into both eyes 4 (four) times daily.   Current Medications    ALPRAZOLAM (XANAX) 1 MG TABLET    Take 1 tablet (1 mg total) by mouth daily as needed for Anxiety.    LISDEXAMFETAMINE (VYVANSE) 50 MG CAPSULE    Take 1 capsule (50 mg total) by mouth every morning.           Diagnosis       1. Conjunctivitis of both eyes, unspecified conjunctivitis type          Assessment/ Plan     Conjunctivitis of both eyes, unspecified conjunctivitis type  -     cromolyn (OPTICROM) 4 % ophthalmic solution; Place 1-2 drops into both eyes 4 (four) times daily.  Dispense: 10 mL; Refill: 2      Med as directed.  Follow-up in clinic as needed.        NAI Kirby  [facility]

## 2018-08-14 RX ORDER — ALPRAZOLAM 1 MG/1
1 TABLET ORAL DAILY PRN
Qty: 30 TABLET | Refills: 0 | Status: SHIPPED | OUTPATIENT
Start: 2018-08-14 | End: 2019-05-30 | Stop reason: SDUPTHER

## 2018-08-14 RX ORDER — LISDEXAMFETAMINE DIMESYLATE 50 MG/1
50 CAPSULE ORAL EVERY MORNING
Qty: 30 CAPSULE | Refills: 0 | Status: SHIPPED | OUTPATIENT
Start: 2018-08-14 | End: 2018-09-14 | Stop reason: SDUPTHER

## 2018-08-15 ENCOUNTER — TELEPHONE (OUTPATIENT)
Dept: FAMILY MEDICINE | Facility: CLINIC | Age: 43
End: 2018-08-15

## 2018-08-15 RX ORDER — SULFACETAMIDE SODIUM 100 MG/ML
2 SOLUTION/ DROPS OPHTHALMIC 4 TIMES DAILY
Qty: 1 BOTTLE | Refills: 0 | Status: SHIPPED | OUTPATIENT
Start: 2018-08-15 | End: 2018-08-20

## 2018-08-15 NOTE — TELEPHONE ENCOUNTER
Pt states his eyes don't feel or look any better and he would like something else called in. He states his eyesight is also hazy.

## 2018-09-14 NOTE — TELEPHONE ENCOUNTER
----- Message from Arline Chung sent at 9/14/2018  3:54 PM CDT -----  Contact: Pt  1. What is the name of the medication you are requesting? VYVANSE  2. What is the dose? 50 Mg  3. How do you take the medication? Orally, topically, etc? Orally  4. How often do you take this medication? Daily  5. Do you need a 30 day or 90 day supply? 30  6. How many refills are you requesting? 1  7. What is your preferred pharmacy and location of the pharmacy? ..  [facility] [address] [place] [address]  Phone: [phone] Fax: [phone]      8. Who can we contact with further questions? ..[phone] ([place])

## 2018-09-17 RX ORDER — LISDEXAMFETAMINE DIMESYLATE 50 MG/1
50 CAPSULE ORAL EVERY MORNING
Qty: 30 CAPSULE | Refills: 0 | Status: SHIPPED | OUTPATIENT
Start: 2018-09-17 | End: 2018-10-12 | Stop reason: SDUPTHER

## 2018-10-12 NOTE — TELEPHONE ENCOUNTER
----- Message from Jocelyne Castillo sent at 10/12/2018  3:06 PM CDT -----  Contact: pt  1. What is the name of the medication you are requesting? Vyvance  2. What is the dose? 50 mg  3. How do you take the medication? Orally, topically, etc? mouth  4. How often do you take this medication? Once daily  5. Do you need a 30 day or 90 day supply? 30  6. How many refills are you requesting? 1  7. What is your preferred pharmacy and location of the pharmacy? [facility] [address]  8. Who can we contact with further questions? [phone]    Please contact pt once it has been sent

## 2018-10-16 RX ORDER — LISDEXAMFETAMINE DIMESYLATE 50 MG/1
50 CAPSULE ORAL EVERY MORNING
Qty: 30 CAPSULE | Refills: 0 | Status: SHIPPED | OUTPATIENT
Start: 2018-10-16 | End: 2018-11-15 | Stop reason: SDUPTHER

## 2018-11-15 RX ORDER — LISDEXAMFETAMINE DIMESYLATE 50 MG/1
50 CAPSULE ORAL EVERY MORNING
Qty: 30 CAPSULE | Refills: 0 | Status: SHIPPED | OUTPATIENT
Start: 2018-11-15 | End: 2018-12-21 | Stop reason: SDUPTHER

## 2018-11-15 NOTE — TELEPHONE ENCOUNTER
----- Message from Valdemar Crisostomo sent at 11/15/2018  7:43 AM CST -----  Contact: pt   Pt is requesting a call back from the nurse in regards to the pt getting a refill  [phone] (home)           1. What is the name of the medication you are requesting? VYVANSE  2. What is the dose? 50 mg  3. How do you take the medication? Orally, topically, etc? Orally  4. How often do you take this medication? 1 daily  5. Do you need a 30 day or 90 day supply? 30 day  6. How many refills are you requesting? Per   7. What is your preferred pharmacy and location of the pharmacy?     [facility] [address] [place] [address] [id number] [place] [address]  [id number] [place] [address]  Phone: [phone] Fax: [phone]    8. Who can we contact with further questions? Pt

## 2018-12-21 NOTE — TELEPHONE ENCOUNTER
----- Message from Aide Zain sent at 12/21/2018 12:08 PM CST -----  Contact: pt  1. What is the name of the medication you are requesting? Vyvanse  2. What is the dose? 60mg  3. How do you take the medication? Orally, topically, etc? orally  4. How often do you take this medication? 1xday  5. Do you need a 30 day or 90 day supply? 30  6. How many refills are you requesting? 1  7. What is your preferred pharmacy and location of the pharmacy? [facility]  8. Who can we contact with further questions? Pt @ [phone]

## 2018-12-25 RX ORDER — LISDEXAMFETAMINE DIMESYLATE 50 MG/1
50 CAPSULE ORAL EVERY MORNING
Qty: 30 CAPSULE | Refills: 0 | Status: SHIPPED | OUTPATIENT
Start: 2018-12-25 | End: 2019-05-30

## 2018-12-27 ENCOUNTER — OFFICE VISIT (OUTPATIENT)
Dept: INTERNAL MEDICINE | Facility: CLINIC | Age: 43
End: 2018-12-27
Payer: COMMERCIAL

## 2018-12-27 VITALS
HEIGHT: 66 IN | OXYGEN SATURATION: 98 % | TEMPERATURE: 99 F | SYSTOLIC BLOOD PRESSURE: 120 MMHG | DIASTOLIC BLOOD PRESSURE: 76 MMHG | HEART RATE: 115 BPM | WEIGHT: 287.25 LBS | RESPIRATION RATE: 16 BRPM | BODY MASS INDEX: 46.16 KG/M2

## 2018-12-27 DIAGNOSIS — H66.92 LEFT OTITIS MEDIA, UNSPECIFIED OTITIS MEDIA TYPE: ICD-10-CM

## 2018-12-27 DIAGNOSIS — D17.1 LIPOMA OF TORSO: ICD-10-CM

## 2018-12-27 DIAGNOSIS — R05.9 COUGH: ICD-10-CM

## 2018-12-27 DIAGNOSIS — J32.9 SINUSITIS, UNSPECIFIED CHRONICITY, UNSPECIFIED LOCATION: Primary | ICD-10-CM

## 2018-12-27 DIAGNOSIS — F41.1 GAD (GENERALIZED ANXIETY DISORDER): ICD-10-CM

## 2018-12-27 PROCEDURE — 99214 OFFICE O/P EST MOD 30 MIN: CPT | Mod: 25,S$GLB,, | Performed by: FAMILY MEDICINE

## 2018-12-27 PROCEDURE — 96372 THER/PROPH/DIAG INJ SC/IM: CPT | Mod: S$GLB,,, | Performed by: FAMILY MEDICINE

## 2018-12-27 PROCEDURE — 3008F BODY MASS INDEX DOCD: CPT | Mod: CPTII,S$GLB,, | Performed by: FAMILY MEDICINE

## 2018-12-27 PROCEDURE — 99999 PR PBB SHADOW E&M-EST. PATIENT-LVL V: CPT | Mod: PBBFAC,,, | Performed by: FAMILY MEDICINE

## 2018-12-27 RX ORDER — CODEINE PHOSPHATE AND GUAIFENESIN 10; 100 MG/5ML; MG/5ML
5 SOLUTION ORAL 3 TIMES DAILY PRN
Qty: 118 ML | Refills: 0 | Status: SHIPPED | OUTPATIENT
Start: 2018-12-27 | End: 2019-01-06

## 2018-12-27 RX ORDER — ESCITALOPRAM OXALATE 10 MG/1
10 TABLET ORAL DAILY
Qty: 30 TABLET | Refills: 11 | Status: SHIPPED | OUTPATIENT
Start: 2018-12-27 | End: 2019-05-30

## 2018-12-27 RX ORDER — METHYLPREDNISOLONE ACETATE 80 MG/ML
80 INJECTION, SUSPENSION INTRA-ARTICULAR; INTRALESIONAL; INTRAMUSCULAR; SOFT TISSUE ONCE
Status: COMPLETED | OUTPATIENT
Start: 2018-12-27 | End: 2018-12-27

## 2018-12-27 RX ORDER — AMOXICILLIN AND CLAVULANATE POTASSIUM 875; 125 MG/1; MG/1
1 TABLET, FILM COATED ORAL EVERY 12 HOURS
Qty: 20 TABLET | Refills: 0 | Status: SHIPPED | OUTPATIENT
Start: 2018-12-27 | End: 2019-05-30

## 2018-12-27 RX ADMIN — METHYLPREDNISOLONE ACETATE 80 MG: 80 INJECTION, SUSPENSION INTRA-ARTICULAR; INTRALESIONAL; INTRAMUSCULAR; SOFT TISSUE at 08:12

## 2018-12-27 NOTE — PROGRESS NOTES
Depo medrol 80 mg given IM   right   Ventrogluteal, patient tolerated well, 15 minute wait recommended to watch for adverse reactions, patient left the clinic in stable condition

## 2018-12-27 NOTE — PROGRESS NOTES
Subjective:      Patient ID: Sergio Parra is a 43 y.o. male.    Chief Complaint: Generalized Body Aches (congestion )      Patient reports sinus congestion, productive coughing for several days now, body aches, pressure in ears. He has been taking dayquil and claritin without much relief.   He also reports history of SHERIF, has been on xanax but doesn't like taking it because it makes him feel sedated and difficult to perform his job. Generalized worrying, occasional panic attacks, difficulty sleeping at night secondary to worrying.   He has ADD as well wanting to get off of his vyvanse, taking it only as needed for now.   He also reports knot in his back, painful when he stretches, feels tight.      Review of Systems   Constitutional: Positive for fatigue. Negative for activity change, appetite change and fever.   HENT: Positive for ear pain, postnasal drip, rhinorrhea, sinus pressure and sore throat. Negative for congestion and sinus pain.    Eyes: Negative for visual disturbance.   Respiratory: Positive for cough. Negative for chest tightness, shortness of breath and wheezing.    Cardiovascular: Negative for chest pain.   Gastrointestinal: Negative for abdominal pain, diarrhea and nausea.   Endocrine: Negative for polyuria.   Musculoskeletal: Positive for back pain and myalgias. Negative for gait problem.   Skin: Negative for rash and wound.   Neurological: Positive for headaches. Negative for dizziness.   Psychiatric/Behavioral: Positive for decreased concentration and sleep disturbance. Negative for dysphoric mood, hallucinations, self-injury and suicidal ideas. The patient is nervous/anxious.      Past Medical History:   Diagnosis Date    Anxiety     Hypercholesteremia     Obesity      Past Surgical History:   Procedure Laterality Date    ANTERIOR CRUCIATE LIGAMENT REPAIR       Family History   Problem Relation Age of Onset    No Known Problems Mother     No Known Problems Father      Social History  "    Socioeconomic History    Marital status: Single     Spouse name: Not on file    Number of children: Not on file    Years of education: Not on file    Highest education level: Not on file   Social Needs    Financial resource strain: Not on file    Food insecurity - worry: Not on file    Food insecurity - inability: Not on file    Transportation needs - medical: Not on file    Transportation needs - non-medical: Not on file   Occupational History    Not on file   Tobacco Use    Smoking status: Never Smoker    Smokeless tobacco: Never Used   Substance and Sexual Activity    Alcohol use: Yes     Alcohol/week: 0.0 oz    Drug use: No    Sexual activity: Yes     Partners: Female   Other Topics Concern    Not on file   Social History Narrative    Not on file     Review of patient's allergies indicates:  No Known Allergies    Objective:       /76   Pulse (!) 115   Temp 99.2 °F (37.3 °C)   Resp 16   Ht 5' 6" (1.676 m)   Wt 130.3 kg (287 lb 4.2 oz)   SpO2 98%   BMI 46.36 kg/m²   Physical Exam   Constitutional: He is oriented to person, place, and time. He appears well-developed and well-nourished. No distress.   HENT:   Head: Normocephalic.   Right Ear: Hearing, tympanic membrane, external ear and ear canal normal.   Left Ear: Hearing, external ear and ear canal normal. Tympanic membrane is erythematous and bulging.   Nose: Mucosal edema present. Right sinus exhibits maxillary sinus tenderness and frontal sinus tenderness. Left sinus exhibits maxillary sinus tenderness and frontal sinus tenderness.   Mouth/Throat: Uvula is midline and mucous membranes are normal. Posterior oropharyngeal erythema present. No oropharyngeal exudate.   Eyes: Conjunctivae and EOM are normal. Pupils are equal, round, and reactive to light.   Neck: Normal range of motion. Neck supple.   Cardiovascular: Normal rate, regular rhythm and normal heart sounds.   Pulmonary/Chest: Effort normal and breath sounds normal. No " respiratory distress. He has no wheezes.   Abdominal: Soft. Bowel sounds are normal. There is no tenderness. There is no guarding. No hernia.   Musculoskeletal: Normal range of motion. He exhibits tenderness. He exhibits no edema.   Large lipoma palpable right mid back.   Lymphadenopathy:     He has no cervical adenopathy.   Neurological: He is alert and oriented to person, place, and time.   Skin: Skin is warm and dry. He is not diaphoretic.   Psychiatric: He has a normal mood and affect. His behavior is normal. Judgment and thought content normal.   Nursing note and vitals reviewed.    Assessment:     1. Sinusitis, unspecified chronicity, unspecified location    2. Cough    3. SHERIF (generalized anxiety disorder)    4. Left otitis media, unspecified otitis media type    5. Lipoma of torso      Plan:   Sinusitis, unspecified chronicity, unspecified location  Cough  Left otitis media, unspecified otitis media type  -     amoxicillin-clavulanate 875-125mg (AUGMENTIN) 875-125 mg per tablet; Take 1 tablet by mouth every 12 (twelve) hours.  Dispense: 20 tablet; Refill: 0  -     methylPREDNISolone acetate injection 80 mg  -     guaifenesin-codeine 100-10 mg/5 ml (TUSSI-ORGANIDIN NR)  mg/5 mL syrup; Take 5 mLs by mouth 3 (three) times daily as needed for Cough.  Dispense: 118 mL; Refill: 0    SHERIF (generalized anxiety disorder)  -     escitalopram oxalate (LEXAPRO) 10 MG tablet; Take 1 tablet (10 mg total) by mouth once daily.  Dispense: 30 tablet; Refill: 11    Lipoma of torso   Discussed possible referral to general surgery to discuss excision, he wants to wait on that for now. May call back for referral if he decides he would like to see surgeon.       He is to followup with his PCP Dr. Nicholas.         Medication List           Accurate as of 12/27/18  9:00 AM. If you have any questions, ask your nurse or doctor.               START taking these medications    amoxicillin-clavulanate 875-125mg 875-125 mg per  tablet  Commonly known as:  AUGMENTIN  Take 1 tablet by mouth every 12 (twelve) hours.  Started by:  Kayla Perez MD     escitalopram oxalate 10 MG tablet  Commonly known as:  LEXAPRO  Take 1 tablet (10 mg total) by mouth once daily.  Started by:  Kayla Perez MD     guaifenesin-codeine 100-10 mg/5 ml  mg/5 mL syrup  Commonly known as:  TUSSI-ORGANIDIN NR  Take 5 mLs by mouth 3 (three) times daily as needed for Cough.  Started by:  Kayla Perez MD        CONTINUE taking these medications    ALPRAZolam 1 MG tablet  Commonly known as:  XANAX  Take 1 tablet (1 mg total) by mouth daily as needed for Anxiety.     lisdexamfetamine 50 MG capsule  Commonly known as:  VYVANSE  Take 1 capsule (50 mg total) by mouth every morning.        STOP taking these medications    cromolyn 4 % ophthalmic solution  Commonly known as:  OPTICROM  Stopped by:  Kayla Perez MD           Where to Get Your Medications      These medications were sent to [facility] [address] [place] [address]    Phone:  [phone]   · amoxicillin-clavulanate 875-125mg 875-125 mg per tablet  · escitalopram oxalate 10 MG tablet  · guaifenesin-codeine 100-10 mg/5 ml  mg/5 mL syrup

## 2019-01-23 ENCOUNTER — TELEPHONE (OUTPATIENT)
Dept: INTERNAL MEDICINE | Facility: CLINIC | Age: 44
End: 2019-01-23

## 2019-01-23 NOTE — TELEPHONE ENCOUNTER
His original rx had refills already. It would be too soon to increase dose, takes about 6 weeks to be fully in his system and working. Needs to discuss with his PCP.

## 2019-01-23 NOTE — TELEPHONE ENCOUNTER
----- Message from Lauraмарина Voss sent at 1/23/2019  8:13 AM CST -----  Contact: Self-[phone]   Pt would like a refills called in  on Rx medication Lexapro and  to see if the dosage amount can be increased on Rx medication Lexapro. Please call back at [phone].  Thx-AH            Pt Uses:  .  [address] [address]  Phone: [phone] Fax: [phone]

## 2019-05-30 ENCOUNTER — OFFICE VISIT (OUTPATIENT)
Dept: FAMILY MEDICINE | Facility: CLINIC | Age: 44
End: 2019-05-30
Payer: COMMERCIAL

## 2019-05-30 VITALS
TEMPERATURE: 98 F | BODY MASS INDEX: 48.86 KG/M2 | WEIGHT: 304 LBS | HEART RATE: 97 BPM | DIASTOLIC BLOOD PRESSURE: 82 MMHG | OXYGEN SATURATION: 96 % | HEIGHT: 66 IN | SYSTOLIC BLOOD PRESSURE: 124 MMHG

## 2019-05-30 DIAGNOSIS — M79.601 MUSCULOSKELETAL PAIN OF UPPER EXTREMITY, RIGHT: Primary | ICD-10-CM

## 2019-05-30 PROCEDURE — 3008F PR BODY MASS INDEX (BMI) DOCUMENTED: ICD-10-PCS | Mod: CPTII,S$GLB,, | Performed by: FAMILY MEDICINE

## 2019-05-30 PROCEDURE — 99999 PR PBB SHADOW E&M-EST. PATIENT-LVL III: ICD-10-PCS | Mod: PBBFAC,,, | Performed by: FAMILY MEDICINE

## 2019-05-30 PROCEDURE — 99999 PR PBB SHADOW E&M-EST. PATIENT-LVL III: CPT | Mod: PBBFAC,,, | Performed by: FAMILY MEDICINE

## 2019-05-30 PROCEDURE — 99213 OFFICE O/P EST LOW 20 MIN: CPT | Mod: S$GLB,,, | Performed by: FAMILY MEDICINE

## 2019-05-30 PROCEDURE — 99213 PR OFFICE/OUTPT VISIT, EST, LEVL III, 20-29 MIN: ICD-10-PCS | Mod: S$GLB,,, | Performed by: FAMILY MEDICINE

## 2019-05-30 PROCEDURE — 3008F BODY MASS INDEX DOCD: CPT | Mod: CPTII,S$GLB,, | Performed by: FAMILY MEDICINE

## 2019-05-30 RX ORDER — ALPRAZOLAM 1 MG/1
1 TABLET ORAL DAILY PRN
Qty: 30 TABLET | Refills: 0 | Status: SHIPPED | OUTPATIENT
Start: 2019-05-30 | End: 2019-10-04 | Stop reason: SDUPTHER

## 2019-05-30 RX ORDER — ACETAMINOPHEN AND CODEINE PHOSPHATE 300; 30 MG/1; MG/1
1 TABLET ORAL EVERY 12 HOURS PRN
Qty: 10 TABLET | Refills: 0 | Status: SHIPPED | OUTPATIENT
Start: 2019-05-30 | End: 2019-06-04

## 2019-05-30 NOTE — PROGRESS NOTES
Sergio Parra    Chief Complaint   Patient presents with    Arm Pain    Shoulder Pain       History of Present Illness:   Ms. Parra comes in today with complaint of having left shoulder, arm pain radiating to wrist for 3 weeks.  She denies associated neck, arm, or shoulder trauma but states she awakened following sleeping incorrectly with this new symptom.  She states she was evaluated at [facility] on May 19, 2019 and was prescribed muscle relaxant, Toradol, and Tylenol No. # along with given steroid shot and told to take Aleve which she states helped some.  She states she had EKG and left shoulder x-ray which were unremarkable.  However, she states pain persists and seems to worsen with brushing her teeth and causes her to have insomnia.  She states she cannot get comfortable.  She reports having pain and popping with lifting and reports having numbness from her shoulders to her hand.  She reports having slight headache today due to insomnia.  She reports pain at 9/10 on pain scale today.  She states she has received massages without help but states moving around makes her feel better.  She is right handed.    She denies having fever, chills, fatigue, appetite changes; shortness of breath, cough, wheezing; chest pain, palpitations, leg swelling; abdominal pain, nausea, vomiting, diarrhea, constipation; unusual urinary symptoms; back pain; anxiety, depression, homicidal or suicidal thoughts.    She is followed by PCP Dr. Avila Nicholas.      Current Outpatient Medications   Medication Sig    ALPRAZolam (XANAX) 1 MG tablet Take 1 tablet (1 mg total) by mouth daily as needed for Anxiety.       Review of Systems   Constitutional: Negative for chills, fatigue and fever.   Respiratory: Negative for cough, shortness of breath and wheezing.    Cardiovascular: Negative for chest pain, palpitations and leg swelling.   Gastrointestinal: Negative for abdominal pain, constipation, diarrhea, nausea  and vomiting.   Genitourinary: Negative for difficulty urinating.   Musculoskeletal: Positive for back pain and myalgias. Negative for joint swelling and neck pain.   Neurological: Positive for numbness and headaches.   Psychiatric/Behavioral: Positive for sleep disturbance. Negative for dysphoric mood and suicidal ideas. The patient is not nervous/anxious.         Negative for homicidal ideas.       Objective:  Physical Exam   Constitutional: He is oriented to person, place, and time. He appears well-developed and well-nourished. No distress.   Pleasant.   Neck: Normal range of motion. Neck supple. No thyromegaly present.   Cardiovascular: Normal rate, regular rhythm and normal heart sounds.   No murmur heard.  Pulmonary/Chest: Effort normal and breath sounds normal. No stridor. No respiratory distress. He has no wheezes.   Abdominal: Soft. Bowel sounds are normal. He exhibits no distension and no mass. There is no tenderness. There is no guarding.   Musculoskeletal: Normal range of motion. He exhibits tenderness. He exhibits no edema.   He is ambulatory without problems. Slightly tender at left neck, upper back and left shoulder with full range of motion noted.   Lymphadenopathy:     He has no cervical adenopathy.   Neurological: He is alert and oriented to person, place, and time. He displays normal reflexes.   Skin: He is not diaphoretic.   Psychiatric: He has a normal mood and affect. His behavior is normal. Judgment and thought content normal.   Vitals reviewed.      ASSESSMENT:  1. Musculoskeletal pain of upper extremity, right        PLAN:  Sergio was seen today for arm pain and shoulder pain.    Diagnoses and all orders for this visit:    Musculoskeletal pain of upper extremity, right  -     acetaminophen-codeine 300-30mg (TYLENOL #3) 300-30 mg Tab; Take 1 tablet by mouth every 12 (twelve) hours as needed (pain).       Continue current medications, follow low sodium, low cholesterol, low carb diet, daily  walks.  Physical therapy consultation advised; however, patient feels he can do physical therapy on his own.  I discussed against imaging; patient agrees.  Medication precautions discussed with patient; Tylenol #3 (5-days only).  Follow up if symptoms worsen or fail to improve.

## 2019-10-04 ENCOUNTER — OFFICE VISIT (OUTPATIENT)
Dept: FAMILY MEDICINE | Facility: CLINIC | Age: 44
End: 2019-10-04
Payer: COMMERCIAL

## 2019-10-04 VITALS
TEMPERATURE: 98 F | RESPIRATION RATE: 18 BRPM | HEART RATE: 112 BPM | WEIGHT: 315 LBS | DIASTOLIC BLOOD PRESSURE: 88 MMHG | HEIGHT: 66 IN | OXYGEN SATURATION: 96 % | BODY MASS INDEX: 50.62 KG/M2 | SYSTOLIC BLOOD PRESSURE: 138 MMHG

## 2019-10-04 DIAGNOSIS — F41.9 ANXIETY: ICD-10-CM

## 2019-10-04 DIAGNOSIS — D17.1 LIPOMA OF BACK: Primary | ICD-10-CM

## 2019-10-04 DIAGNOSIS — G47.30 SLEEP APNEA, UNSPECIFIED TYPE: ICD-10-CM

## 2019-10-04 PROCEDURE — 99214 OFFICE O/P EST MOD 30 MIN: CPT | Mod: S$GLB,,, | Performed by: INTERNAL MEDICINE

## 2019-10-04 PROCEDURE — 3008F PR BODY MASS INDEX (BMI) DOCUMENTED: ICD-10-PCS | Mod: CPTII,S$GLB,, | Performed by: INTERNAL MEDICINE

## 2019-10-04 PROCEDURE — 3008F BODY MASS INDEX DOCD: CPT | Mod: CPTII,S$GLB,, | Performed by: INTERNAL MEDICINE

## 2019-10-04 PROCEDURE — 99214 PR OFFICE/OUTPT VISIT, EST, LEVL IV, 30-39 MIN: ICD-10-PCS | Mod: S$GLB,,, | Performed by: INTERNAL MEDICINE

## 2019-10-04 PROCEDURE — 99999 PR PBB SHADOW E&M-EST. PATIENT-LVL IV: ICD-10-PCS | Mod: PBBFAC,,, | Performed by: INTERNAL MEDICINE

## 2019-10-04 PROCEDURE — 99999 PR PBB SHADOW E&M-EST. PATIENT-LVL IV: CPT | Mod: PBBFAC,,, | Performed by: INTERNAL MEDICINE

## 2019-10-04 RX ORDER — ALPRAZOLAM 1 MG/1
1 TABLET ORAL DAILY PRN
Qty: 30 TABLET | Refills: 0 | Status: SHIPPED | OUTPATIENT
Start: 2019-10-04 | End: 2020-01-17 | Stop reason: SDUPTHER

## 2019-10-04 NOTE — PROGRESS NOTES
Subjective:       Patient ID: Sergio Parra is a 44 y.o. male.    Chief Complaint: Insomnia and Anxiety    Wants machine for sleep apnea---------has difficulty sleeping -------awakes with sob------------    Anxiety   Presents for follow-up visit. Patient reports no chest pain, confusion, decreased concentration, dizziness, nausea, nervous/anxious behavior, palpitations, shortness of breath or suicidal ideas.         Past Medical History:   Diagnosis Date    Anxiety     Hypercholesteremia     Obesity      Past Surgical History:   Procedure Laterality Date    ANTERIOR CRUCIATE LIGAMENT REPAIR       Family History   Problem Relation Age of Onset    No Known Problems Mother     No Known Problems Father      Social History     Socioeconomic History    Marital status: Single     Spouse name: Not on file    Number of children: Not on file    Years of education: Not on file    Highest education level: Not on file   Occupational History    Not on file   Social Needs    Financial resource strain: Not on file    Food insecurity:     Worry: Not on file     Inability: Not on file    Transportation needs:     Medical: Not on file     Non-medical: Not on file   Tobacco Use    Smoking status: Never Smoker    Smokeless tobacco: Never Used   Substance and Sexual Activity    Alcohol use: Yes     Alcohol/week: 0.0 standard drinks    Drug use: No    Sexual activity: Yes     Partners: Female   Lifestyle    Physical activity:     Days per week: Not on file     Minutes per session: Not on file    Stress: Not on file   Relationships    Social connections:     Talks on phone: Not on file     Gets together: Not on file     Attends Jain service: Not on file     Active member of club or organization: Not on file     Attends meetings of clubs or organizations: Not on file     Relationship status: Not on file   Other Topics Concern    Not on file   Social History Narrative    Not on file     Review of Systems    Constitutional: Negative for activity change, appetite change, chills, diaphoresis, fatigue, fever and unexpected weight change.   HENT: Negative for drooling, ear discharge, ear pain, facial swelling, hearing loss, mouth sores, nosebleeds, postnasal drip, rhinorrhea, sinus pressure, sneezing, sore throat, tinnitus, trouble swallowing and voice change.    Eyes: Negative for photophobia, redness and visual disturbance.   Respiratory: Negative for apnea, cough, choking, chest tightness, shortness of breath and wheezing.    Cardiovascular: Negative for chest pain and palpitations.   Gastrointestinal: Negative for abdominal distention, abdominal pain, anal bleeding, blood in stool, constipation, diarrhea, nausea and vomiting.   Endocrine: Negative for cold intolerance, heat intolerance, polydipsia, polyphagia and polyuria.   Genitourinary: Negative for difficulty urinating, dysuria, enuresis, flank pain, frequency, genital sores, hematuria and urgency.   Musculoskeletal: Positive for arthralgias and myalgias. Negative for back pain, gait problem, joint swelling, neck pain and neck stiffness.   Skin: Negative for color change, pallor, rash and wound.        Cyst on back------   Allergic/Immunologic: Negative for food allergies and immunocompromised state.   Neurological: Negative for dizziness, tremors, seizures, syncope, facial asymmetry, speech difficulty, weakness, light-headedness, numbness and headaches.   Hematological: Negative for adenopathy. Does not bruise/bleed easily.   Psychiatric/Behavioral: Positive for sleep disturbance. Negative for agitation, behavioral problems, confusion, decreased concentration, dysphoric mood, hallucinations, self-injury and suicidal ideas. The patient is not nervous/anxious and is not hyperactive.        Objective:      Physical Exam   Constitutional: He is oriented to person, place, and time. He appears well-developed and well-nourished. No distress.   HENT:   Head:  Normocephalic and atraumatic.   Neck: Normal range of motion. Carotid bruit is not present.   Musculoskeletal: Normal range of motion.   Neurological: He is alert and oriented to person, place, and time.   Skin: Skin is warm and dry. No rash noted. He is not diaphoretic. No erythema. No pallor.   ?lipoma-back   Psychiatric: He has a normal mood and affect. His behavior is normal. Judgment and thought content normal.   Nursing note and vitals reviewed.      CMP  No results found for: NA, K, CL, CO2, GLU, BUN, CREATININE, CALCIUM, PROT, ALBUMIN, BILITOT, ALKPHOS, AST, ALT, ANIONGAP, ESTGFRAFRICA, EGFRNONAA  No results found for: WBC, HGB, HCT, MCV, PLT  No results found for: CHOL  No results found for: HDL  No results found for: LDLCALC  No results found for: TRIG  No results found for: CHOLHDL  No results found for: TSH  No results found for: LABA1C, HGBA1C  Assessment:       1. Lipoma of back    2. Sleep apnea, unspecified type    3. Anxiety        Plan:   Lipoma of back  -     Ambulatory referral to General Surgery    Sleep apnea, unspecified type  -     Ambulatory referral to Pulmonology    Anxiety  -     ALPRAZolam (XANAX) 1 MG tablet; Take 1 tablet (1 mg total) by mouth daily as needed for Anxiety.  Dispense: 30 tablet; Refill: 0    F/u 1 month-------health maintenance---------

## 2019-10-10 ENCOUNTER — OFFICE VISIT (OUTPATIENT)
Dept: SURGERY | Facility: CLINIC | Age: 44
End: 2019-10-10
Payer: COMMERCIAL

## 2019-10-10 ENCOUNTER — OFFICE VISIT (OUTPATIENT)
Dept: PULMONOLOGY | Facility: CLINIC | Age: 44
End: 2019-10-10
Payer: COMMERCIAL

## 2019-10-10 ENCOUNTER — TELEPHONE (OUTPATIENT)
Dept: PULMONOLOGY | Facility: CLINIC | Age: 44
End: 2019-10-10

## 2019-10-10 VITALS
BODY MASS INDEX: 50.62 KG/M2 | HEART RATE: 101 BPM | BODY MASS INDEX: 50.62 KG/M2 | WEIGHT: 315 LBS | RESPIRATION RATE: 18 BRPM | HEIGHT: 66 IN | SYSTOLIC BLOOD PRESSURE: 144 MMHG | DIASTOLIC BLOOD PRESSURE: 90 MMHG | SYSTOLIC BLOOD PRESSURE: 144 MMHG | HEIGHT: 66 IN | DIASTOLIC BLOOD PRESSURE: 90 MMHG | HEART RATE: 101 BPM | OXYGEN SATURATION: 98 % | WEIGHT: 315 LBS

## 2019-10-10 DIAGNOSIS — R22.9 SUBCUTANEOUS MASS: Primary | ICD-10-CM

## 2019-10-10 DIAGNOSIS — G47.33 OSA (OBSTRUCTIVE SLEEP APNEA): Primary | ICD-10-CM

## 2019-10-10 PROCEDURE — 99999 PR PBB SHADOW E&M-EST. PATIENT-LVL III: ICD-10-PCS | Mod: PBBFAC,,, | Performed by: INTERNAL MEDICINE

## 2019-10-10 PROCEDURE — 99499 NO LOS: ICD-10-PCS | Mod: S$GLB,,, | Performed by: SURGERY

## 2019-10-10 PROCEDURE — 99204 OFFICE O/P NEW MOD 45 MIN: CPT | Mod: S$GLB,,, | Performed by: INTERNAL MEDICINE

## 2019-10-10 PROCEDURE — 99499 UNLISTED E&M SERVICE: CPT | Mod: S$GLB,,, | Performed by: SURGERY

## 2019-10-10 PROCEDURE — 99204 PR OFFICE/OUTPT VISIT, NEW, LEVL IV, 45-59 MIN: ICD-10-PCS | Mod: S$GLB,,, | Performed by: INTERNAL MEDICINE

## 2019-10-10 PROCEDURE — 99999 PR PBB SHADOW E&M-EST. PATIENT-LVL III: CPT | Mod: PBBFAC,,, | Performed by: SURGERY

## 2019-10-10 PROCEDURE — 99999 PR PBB SHADOW E&M-EST. PATIENT-LVL III: ICD-10-PCS | Mod: PBBFAC,,, | Performed by: SURGERY

## 2019-10-10 PROCEDURE — 99999 PR PBB SHADOW E&M-EST. PATIENT-LVL III: CPT | Mod: PBBFAC,,, | Performed by: INTERNAL MEDICINE

## 2019-10-10 PROCEDURE — 3008F PR BODY MASS INDEX (BMI) DOCUMENTED: ICD-10-PCS | Mod: CPTII,S$GLB,, | Performed by: INTERNAL MEDICINE

## 2019-10-10 PROCEDURE — 3008F BODY MASS INDEX DOCD: CPT | Mod: CPTII,S$GLB,, | Performed by: INTERNAL MEDICINE

## 2019-10-10 NOTE — PROGRESS NOTES
"Subjective:       Sergio Parra is a 44 y.o. male self-referred for a sleep evaluation. He complains of snoring, snorting, decreased memory, decreased concentration, excessive daytime sleepiness, falling asleep while reading, watching television, difficulty falling asleep once awakened, feels sleepy during the day, take naps during the day.  Symptoms began 5 years ago, gradually worsening since that time.  He goes to sleep at 9 weekdays and  weekends. He awakens 5 weekdays and  weekends. He falls asleep in 20 minutes.  Collar size na. He denies knees buckling with laughing, completely or partially paralyzed while falling asleep or waking up. Previous evaluation and treatment has included none.  EPWORTH 14    Chocking at night  Snoring      Previous Report(s) Reviewed: historical medical records     The following portions of the patient's history were reviewed and updated as appropriate: allergies, current medications, past family history, past medical history, past social history, past surgical history and problem list.  Past Medical History:   Diagnosis Date    Anxiety     Hypercholesteremia     Obesity      Past Surgical History:   Procedure Laterality Date    ANTERIOR CRUCIATE LIGAMENT REPAIR         Review of Systems  Pertinent items are noted in HPI.      Objective:      BP (!) 144/90   Pulse 101   Resp 18   Ht 5' 6" (1.676 m)   Wt (!) 144.5 kg (318 lb 9 oz)   SpO2 98%   BMI 51.42 kg/m²   General appearance: alert, appears stated age and cooperative  Head: Normocephalic, without obvious abnormality, atraumatic  Eyes: conjunctivae/corneas clear. PERRL, EOM's intact. Fundi benign.  Ears: normal TM's and external ear canals both ears  Nose: Nares normal. Septum midline. Mucosa normal. No drainage or sinus tenderness.  Throat: lips, mucosa, and tongue normal; teeth and gums normal  Lungs: clear to auscultation bilaterally  Heart: regular rate and rhythm, S1, S2 normal, no murmur, click, rub or " gallop  Neurologic: Grossly normal      Assessment:      obstructive sleep apnea           1. EMILY (obstructive sleep apnea)        Outpatient Encounter Medications as of 10/10/2019   Medication Sig Dispense Refill    ALPRAZolam (XANAX) 1 MG tablet Take 1 tablet (1 mg total) by mouth daily as needed for Anxiety. 30 tablet 0     No facility-administered encounter medications on file as of 10/10/2019.      Orders Placed This Encounter   Procedures    Home Sleep Studies     Patient needs to have it done ASAP - leaving town     Standing Status:   Future     Standing Expiration Date:   10/10/2020     Scheduling Instructions:      Call patient and instruct on procedure for home sleep study. Schedule follow up 1 weeks after completion     Plan:      PSG        No follow-ups on file.    Schedule follow up one week after completion of sleep study.

## 2019-10-10 NOTE — PROGRESS NOTES
Patient ID: Sergio Parra is a 44 y.o. male.    Possible lipoma  Chief Complaint: Cyst      HPI:  Patient is sent for evaluation of a right mid lateral back lipoma.  The patient reports a fullness in this area. He says he has some discomfort when he sits against the area or when he bends over.  He was evaluated by his primary care doctor there is a question of a lipoma.  The patient is morbidly obese.  He has significant fat deposition across the upper back especially in the region of the lower scapula      Review of Systems   Constitutional: Negative.    HENT: Negative.    Eyes: Negative.    Respiratory: Negative.    Cardiovascular: Negative.    Gastrointestinal: Negative.    Endocrine: Negative.    Genitourinary: Negative.    Musculoskeletal: Positive for back pain (When bending over on the right side).   Skin: Negative.         Fullness of the right lateral black just below the scapula   Allergic/Immunologic: Negative.    Neurological: Negative.    Hematological: Negative.    Psychiatric/Behavioral: Negative.        Current Outpatient Medications   Medication Sig Dispense Refill    ALPRAZolam (XANAX) 1 MG tablet Take 1 tablet (1 mg total) by mouth daily as needed for Anxiety. 30 tablet 0     No current facility-administered medications for this visit.        Review of patient's allergies indicates:  No Known Allergies    Past Medical History:   Diagnosis Date    Anxiety     Hypercholesteremia     Obesity        Past Surgical History:   Procedure Laterality Date    ANTERIOR CRUCIATE LIGAMENT REPAIR         Family History   Problem Relation Age of Onset    No Known Problems Mother     No Known Problems Father        Social History     Socioeconomic History    Marital status: Single     Spouse name: Not on file    Number of children: Not on file    Years of education: Not on file    Highest education level: Not on file   Occupational History    Not on file   Social Needs    Financial resource  strain: Not on file    Food insecurity:     Worry: Not on file     Inability: Not on file    Transportation needs:     Medical: Not on file     Non-medical: Not on file   Tobacco Use    Smoking status: Never Smoker    Smokeless tobacco: Never Used   Substance and Sexual Activity    Alcohol use: Yes     Alcohol/week: 0.0 standard drinks    Drug use: No    Sexual activity: Yes     Partners: Female   Lifestyle    Physical activity:     Days per week: Not on file     Minutes per session: Not on file    Stress: Not on file   Relationships    Social connections:     Talks on phone: Not on file     Gets together: Not on file     Attends Restorationist service: Not on file     Active member of club or organization: Not on file     Attends meetings of clubs or organizations: Not on file     Relationship status: Not on file   Other Topics Concern    Not on file   Social History Narrative    Not on file       Vitals:    10/10/19 1452   BP: (!) 144/90   Pulse: 101       Physical Exam   Constitutional: No distress.   Morbidly obese   Neck: Normal range of motion.   Cardiovascular: Normal rate and regular rhythm.   Pulmonary/Chest: Effort normal and breath sounds normal.   Abdominal: Soft. Bowel sounds are normal.   Musculoskeletal: Normal range of motion.   Skin: Skin is warm and dry.   The upper back is fairly broad.  There is a fullness on the right side at the lateral back just below the area the scapula compared to the left side.  There is no magy lipoma palpable.  (The inability to palpate a lipoma is likely secondary to the patient's size)   Nursing note and vitals reviewed.      Assessment & Plan:    possible back lipoma in a morbidly obese patient    We will obtain an ultrasound      The patient will determine if he can afford the ultrasound as he has a high deductible insurance plan. We will not charge him for this visit as an ultrasound to determine if the lipoma is present is a better use of his limited  financial resources with regard to his insurance

## 2019-10-10 NOTE — LETTER
October 10, 2019      Avila Nicholas MD  [address] [place] [address]           [facility]  [address] [facility]  [place] [address]  Phone: [phone]  Fax: [phone]          Patient: Sergio Parra   MR Number: [id number]   YOB: 1975   Date of Visit: 10/10/2019       Dear Dr. Avila Nicholas:    Thank you for referring Sergio Parra to me for evaluation. Attached you will find relevant portions of my assessment and plan of care.    If you have questions, please do not hesitate to call me. I look forward to following Sergio Parra along with you.    Sincerely,    Jorge Francis MD    Enclosure  CC:  No Recipients    If you would like to receive this communication electronically, please contact [email or web address] or [phone] to request more information on [employer] Link access.    For providers and/or their staff who would like to refer a patient to [facility], please contact us through our one-stop-shop provider referral line, [facility], at [phone].    If you feel you have received this communication in error or would no longer like to receive these types of communications, please e-mail [email or web address]

## 2019-10-10 NOTE — LETTER
October 10, 2019      Avila Nicholas MD  [address] [place] [address]           [facility]  [address] [facility]  [place] [address]  Phone: [phone]  Fax: [phone]          Patient: Sergio Parra   MR Number: [id number]   YOB: 1975   Date of Visit: 10/10/2019       Dear Dr. Avila Nicholas:    Thank you for referring Sergio Parra to me for evaluation. Attached you will find relevant portions of my assessment and plan of care.    If you have questions, please do not hesitate to call me. I look forward to following Sergio Parra along with you.    Sincerely,    Watson Blount MD    Enclosure  CC:  No Recipients    If you would like to receive this communication electronically, please contact [email or web address] or [phone] to request more information on [employer] Link access.    For providers and/or their staff who would like to refer a patient to [facility], please contact us through our one-stop-shop provider referral line, [facility], at [phone].    If you feel you have received this communication in error or would no longer like to receive these types of communications, please e-mail [email or web address]

## 2019-10-10 NOTE — PATIENT INSTRUCTIONS
Please call [facility] to schedule sleep study at  [phone] . Usually take 1- 2 days to get insurance company approval.  You will need to schedule at follow up clinic appointment 7 days after the sleep study to review the results.

## 2019-10-10 NOTE — PATIENT INSTRUCTIONS
We will get an ultrasound to see if you have a lipoma    If if is to expensive we can just follow the area

## 2019-10-14 ENCOUNTER — PROCEDURE VISIT (OUTPATIENT)
Dept: SLEEP MEDICINE | Facility: CLINIC | Age: 44
End: 2019-10-14
Payer: COMMERCIAL

## 2019-10-14 DIAGNOSIS — G47.33 OSA (OBSTRUCTIVE SLEEP APNEA): Primary | ICD-10-CM

## 2019-10-14 PROCEDURE — 99499 UNLISTED E&M SERVICE: CPT | Mod: S$GLB,,, | Performed by: INTERNAL MEDICINE

## 2019-10-14 PROCEDURE — 95800 PR SLEEP STUDY, UNATTENDED, RECORD HEART RATE/O2 SAT/RESP ANAL/SLEEP TIME: ICD-10-PCS | Mod: 26,,, | Performed by: INTERNAL MEDICINE

## 2019-10-14 PROCEDURE — 99499 NO LOS: ICD-10-PCS | Mod: S$GLB,,, | Performed by: INTERNAL MEDICINE

## 2019-10-14 PROCEDURE — 95800 SLP STDY UNATTENDED: CPT

## 2019-10-14 PROCEDURE — 95800 SLP STDY UNATTENDED: CPT | Mod: 26,,, | Performed by: INTERNAL MEDICINE

## 2019-10-14 NOTE — PROCEDURES
Home Sleep Studies  Date/Time: 10/14/2019 8:00 AM  Performed by: Ludin Grant MD  Authorized by: Watson Blount MD       PHYSICIAN INTERPRETATION AND COMMENTS: Findings are consistent with very severe, positional obstructive  sleep apnea (EMILY), with indications of respiratory control instability. Overall AHI was 48.0 events per hour with 6 hr of sleep.  Oxygen saturation mikhail was 75.4%. Very loud snoring recorded.Indications of cardiac dysrhythmia are recorded. Please refer  to Cardiology. Treatment with CPAP is indicated. Consider in-lab titration. Close follow-up to ensure resolution of symptoms  CLINICAL HISTORY: 44 year old male presented with: snoring, witnessed apnea, daytime sleepiness. 16 inch neck, BMI of  47, an [employer] sleepiness score of 14, history of a previous diagnosis of EMILY and symptoms of nocturnal snoring, waking up  choking and witnessed apneas. Based on the clinical history, the patient has a high pre-test probability of having severe EMILY.  SLEEP STUDY FINDINGS: Patient underwent a one night Home Sleep Test and by behavioral criteria, slept for  approximately 6 hours, with a sleep latency of 23 minutes and a sleep efficiency of 86.2%. Severe sleep disordered breathing  (AHI=48) is noted based on a 4% hypopnea desaturation criteria, predominantly in the supine position (117 events/hour)  andwith indications of respiratory control instability. The patient slept supine 7.1% of the night based on valid recording time of  6.03 hours and is 2.7 times as likely to have apneas/hypopneas when supine. When considering more subtle measures of sleep  disordered breathing, the overall respiratory disturbance index is very severe (RDI=71) based on a 1% hypopnea desaturation  criteria with confirmation by surrogate arousal indicators. The apneas/hypopneas are accompanied by severe oxygen  desaturation (percent time below 90% SpO2: 19.3%, Min SpO2: 75.4%). The average desaturation across all sleep  disordered  breathing events is 4.8%. Snoring occurs for 46.4% (30 dB) of the study, 41.8% is extremely loud. The mean pulse rate is 83  BPM, with very frequent pulse rate variability (150 events with >= 6 BPM increase/decrease per hour).  TREATMENT CONSIDERATIONS: Consider nasal continuous positive airway pressure (CPAP) as the initial  treatment choice for very severe obstructive sleep apnea. Consider an attended CPAP titration study. Presence of  respiratory control instability should be evaluated by a sleep specialist prior to initiating positive pressure treatment. If the patient  fails CPAP therapy, begin supplemental oxygen. The patient should avoid sleeping supine; the non-supine RDI is 1.9 times  less severe than the supine RDI.  DISEASE MANAGEMENT CONSIDERATIONS: Irregularity of the pulse rate signals indicates possible cardiac  dysrhythmia. If clinically appropriate, further cardiac evaluation is suggested.

## 2019-10-14 NOTE — Clinical Note
PHYSICIAN INTERPRETATION AND COMMENTS: Findings are consistent with very severe, positional obstructivesleep apnea (EMILY), with indications of respiratory control instability. Overall AHI was 48.0 events per hour with 6 hr of sleep.Oxygen saturation mikhail was 75.4%. Very loud snoring recorded.Indications of cardiac dysrhythmia are recorded. Please referto Cardiology. Treatment with CPAP is indicated. Consider in-lab titration. Close follow-up to ensure resolution of symptomsCLINICAL HISTORY: 44 year old male presented with: snoring, witnessed apnea, daytime sleepiness. 16 inch neck, BMI of47, an [place] sleepiness score of 14, history of a previous diagnosis of EMILY and symptoms of nocturnal snoring, waking upchoking and witnessed apneas. Based on the clinical history, the patient has a high pre-test probability of having severe EMILY.SLEEP STUDY FINDINGS: Patient underwent a one night Home Sleep Test and by behavioral criteria, slept forapproximately 6 hours, with a sleep latency of 23 minutes an

## 2019-10-14 NOTE — PROGRESS NOTES
PHYSICIAN INTERPRETATION AND COMMENTS: Findings are consistent with very severe, positional obstructive  sleep apnea (EMILY), with indications of respiratory control instability. Overall AHI was 48.0 events per hour with 6 hr of sleep.  Oxygen saturation mikhail was 75.4%. Very loud snoring recorded.Indications of cardiac dysrhythmia are recorded. Please refer  to Cardiology. Treatment with CPAP is indicated. Consider in-lab titration. Close follow-up to ensure resolution of symptoms  CLINICAL HISTORY: 44 year old male presented with: snoring, witnessed apnea, daytime sleepiness. 16 inch neck, BMI of  47, an [place] sleepiness score of 14, history of a previous diagnosis of EMILY and symptoms of nocturnal snoring, waking up  choking and witnessed apneas. Based on the clinical history, the patient has a high pre-test probability of having severe EMILY.  SLEEP STUDY FINDINGS: Patient underwent a one night Home Sleep Test and by behavioral criteria, slept for  approximately 6 hours, with a sleep latency of 23 minutes and a sleep efficiency of 86.2%. Severe sleep disordered breathing  (AHI=48) is noted based on a 4% hypopnea desaturation criteria, predominantly in the supine position (117 events/hour)  andwith indications of respiratory control instability. The patient slept supine 7.1% of the night based on valid recording time of  6.03 hours and is 2.7 times as likely to have apneas/hypopneas when supine. When considering more subtle measures of sleep  disordered breathing, the overall respiratory disturbance index is very severe (RDI=71) based on a 1% hypopnea desaturation  criteria with confirmation by surrogate arousal indicators. The apneas/hypopneas are accompanied by severe oxygen  desaturation (percent time below 90% SpO2: 19.3%, Min SpO2: 75.4%). The average desaturation across all sleep disordered  breathing events is 4.8%. Snoring occurs for 46.4% (30 dB) of the study, 41.8% is extremely loud. The mean pulse rate is  83  BPM, with very frequent pulse rate variability (150 events with >= 6 BPM increase/decrease per hour).  TREATMENT CONSIDERATIONS: Consider nasal continuous positive airway pressure (CPAP) as the initial  treatment choice for very severe obstructive sleep apnea. Consider an attended CPAP titration study. Presence of  respiratory control instability should be evaluated by a sleep specialist prior to initiating positive pressure treatment. If the patient  fails CPAP therapy, begin supplemental oxygen. The patient should avoid sleeping supine; the non-supine RDI is 1.9 times  less severe than the supine RDI.  DISEASE MANAGEMENT CONSIDERATIONS: Irregularity of the pulse rate signals indicates possible cardiac  dysrhythmia. If clinically appropriate, further cardiac evaluation is suggested.

## 2019-10-16 ENCOUNTER — TELEPHONE (OUTPATIENT)
Dept: PULMONOLOGY | Facility: CLINIC | Age: 44
End: 2019-10-16

## 2019-10-16 NOTE — TELEPHONE ENCOUNTER
Please call patient and schedule an appointment as soon as possible with me or nurse practitioner to prescribe his CPAP machine

## 2019-10-16 NOTE — TELEPHONE ENCOUNTER
----- Message from Corwin Ahn sent at 10/16/2019  7:47 AM CDT -----  Hello,  Patient called and would like to know his results of Home sleep study asap.He has a follow up scheduled October 24. He would like it moved up or can someone call him?    Thanks

## 2019-10-18 ENCOUNTER — OFFICE VISIT (OUTPATIENT)
Dept: PULMONOLOGY | Facility: CLINIC | Age: 44
End: 2019-10-18
Payer: COMMERCIAL

## 2019-10-18 VITALS
WEIGHT: 315 LBS | SYSTOLIC BLOOD PRESSURE: 120 MMHG | BODY MASS INDEX: 50.62 KG/M2 | HEART RATE: 102 BPM | OXYGEN SATURATION: 96 % | RESPIRATION RATE: 20 BRPM | HEIGHT: 66 IN | DIASTOLIC BLOOD PRESSURE: 66 MMHG

## 2019-10-18 DIAGNOSIS — E66.01 MORBID OBESITY WITH BMI OF 45.0-49.9, ADULT: ICD-10-CM

## 2019-10-18 DIAGNOSIS — G47.33 OBSTRUCTIVE SLEEP APNEA: Primary | ICD-10-CM

## 2019-10-18 PROCEDURE — 99214 OFFICE O/P EST MOD 30 MIN: CPT | Mod: S$GLB,,, | Performed by: NURSE PRACTITIONER

## 2019-10-18 PROCEDURE — 99999 PR PBB SHADOW E&M-EST. PATIENT-LVL III: CPT | Mod: PBBFAC,,, | Performed by: NURSE PRACTITIONER

## 2019-10-18 PROCEDURE — 99999 PR PBB SHADOW E&M-EST. PATIENT-LVL III: ICD-10-PCS | Mod: PBBFAC,,, | Performed by: NURSE PRACTITIONER

## 2019-10-18 PROCEDURE — 3008F BODY MASS INDEX DOCD: CPT | Mod: CPTII,S$GLB,, | Performed by: NURSE PRACTITIONER

## 2019-10-18 PROCEDURE — 3008F PR BODY MASS INDEX (BMI) DOCUMENTED: ICD-10-PCS | Mod: CPTII,S$GLB,, | Performed by: NURSE PRACTITIONER

## 2019-10-18 PROCEDURE — 99214 PR OFFICE/OUTPT VISIT, EST, LEVL IV, 30-39 MIN: ICD-10-PCS | Mod: S$GLB,,, | Performed by: NURSE PRACTITIONER

## 2019-10-18 NOTE — PROGRESS NOTES
Subjective:      Patient ID: Sergio Parra is a 44 y.o. male.    Patient Active Problem List   Diagnosis    Hypercholesteremia    Anxiety    Morbid obesity with BMI of 45.0-49.9, adult    Myofascial pain    Arm weakness-rotator cuff weakness    Chronic neck pain    Chronic back pain    Obstructive sleep apnea     he has been referred by Avila Nicholas for evaluation and management for   Chief Complaint   Patient presents with    Sleep Apnea     Chief Complaint: Sleep Apnea    HPI:  He presents for review of home sleep study ordered by Dr. Thong Blount 10/10/2019 severe obstructive sleep apnea 48 events an hour.  Overall respiratory distress index 71.  Mean O2 sat 91.9%.  Patient is symptomatic for obstructive sleep apnea with daytime sleepiness, feeling tired, witnessed apnea, awakening gasping for air.  Orders today auto CPAP 6-20 cm Full face mask.   Bed time is 0900 - 0930  Wake time is 0500 - 0600  Sleep onset is within  15 Minutes.  Sleep maintenance difficulties related to frequent night time awakening and non-restful sleep  Wake after sleep onset occurs five times a night.  Nocturia occurs three - four times a night,   Sleep aids : YES, Melatonin 5mg (5HTP)  Dry mouth : YES  Sleep walking:  NO  Sleep talking :  NO  Sleep eating: NO  Vivid Dreams :  NO  Cataplexy :  NO    [place] sleepiness score was 14.  Neck circumference is 55 cm. (21.5 inches).  Mallampati score 3    Occupational History:   Employed full time as   for engineering firm      Previous Report Reviewed: lab reports and office notes     Past Medical History: The following portions of the patient's history were reviewed and updated as appropriate:   He  has a past surgical history that includes Anterior cruciate ligament repair.  His family history includes No Known Problems in his father and mother.  He  reports that he has never smoked. He has never used smokeless tobacco. He reports that he drinks  "alcohol. He reports that he does not use drugs.  He has a current medication list which includes the following prescription(s): alprazolam.  He has No Known Allergies.    Review of Systems   Constitutional: Negative for fever, chills, weight loss, weight gain, activity change, appetite change, fatigue and night sweats.   HENT: Negative for postnasal drip, rhinorrhea, sinus pressure, voice change and congestion.    Eyes: Negative for redness and itching.   Respiratory: Negative for snoring, cough, sputum production, chest tightness, shortness of breath, wheezing, orthopnea, asthma nighttime symptoms, dyspnea on extertion, use of rescue inhaler and somnolence.    Cardiovascular: Negative.  Negative for chest pain, palpitations and leg swelling.   Genitourinary: Negative for difficulty urinating and hematuria.   Endocrine: Negative for cold intolerance and heat intolerance.    Musculoskeletal: Negative for arthralgias, gait problem, joint swelling and myalgias.   Skin: Negative.    Gastrointestinal: Negative for nausea, vomiting, abdominal pain and acid reflux.   Neurological: Negative for dizziness, weakness, light-headedness and headaches.   Hematological: Negative for adenopathy. No excessive bruising.   All other systems reviewed and are negative.     Objective:   /66   Pulse 102   Resp 20   Ht 5' 6" (1.676 m)   Wt (!) 145.4 kg (320 lb 7 oz)   SpO2 96%   BMI 51.72 kg/m²   Physical Exam   Constitutional: He is oriented to person, place, and time. He appears well-developed and well-nourished. He is active and cooperative.  Non-toxic appearance. He does not appear ill. No distress.   HENT:   Head: Normocephalic and atraumatic.   Right Ear: External ear normal.   Left Ear: External ear normal.   Nose: Nose normal.   Mouth/Throat: Oropharynx is clear and moist. No oropharyngeal exudate.   Eyes: Conjunctivae are normal.   Neck: Normal range of motion. Neck supple.   Cardiovascular: Normal rate, regular " rhythm, normal heart sounds and intact distal pulses.   Pulmonary/Chest: Effort normal and breath sounds normal.   Abdominal: Soft.   Musculoskeletal: He exhibits no edema.   Neurological: He is alert and oriented to person, place, and time.   Skin: Skin is warm and dry.   Psychiatric: He has a normal mood and affect. His behavior is normal. Judgment and thought content normal.   Vitals reviewed.    Personal Diagnostic Review  Home sleep study 10/10/2019 ordered by Dr. Watson Blount.    Severe obstructive sleep apnea overall AHI 48.0 events per hour with 6 hr of sleep.  Oxygen saturation mikhail was 75.4% mean O2 sat 91.9%.      Assessment:     1. Obstructive sleep apnea    2. Morbid obesity with BMI of 45.0-49.9, adult      Orders Placed This Encounter   Procedures    CPAP FOR HOME USE     Home sleep study 10/10/2019 AHI 48.0     Order Specific Question:   Type:     Answer:   Auto CPAP     Order Specific Question:   Auto CPAP pressure setting range (cmH20):     Answer:   6-20     Order Specific Question:   Length of need (1-99 months):     Answer:   99     Order Specific Question:   Humidification:     Answer:   Heated     Order Specific Question:   Type of mask:     Answer:   FFM     Order Specific Question:   Headgear?     Answer:   Yes     Order Specific Question:   Tubing?     Answer:   Yes     Order Specific Question:   Humidifier chamber?     Answer:   Yes     Order Specific Question:   Chin strap?     Answer:   Yes     Order Specific Question:   Filters?     Answer:   Yes     Order Specific Question:   Cushions?     Answer:   Yes     Plan:   Discussed diagnosis, its evaluation, treatment and usual course. All questions answered.  Problem List Items Addressed This Visit     Obstructive sleep apnea - Primary     Home sleep study 10/10/2019 severe obstructive sleep apnea 48.  Overall RDI 71.  Mean O2 sat 91.9%.   10/18/2019 orders auto CPAP 6-20 cm. Full face mask.            Relevant Orders    CPAP FOR HOME USE     Morbid obesity with BMI of 45.0-49.9, adult     Encouraged calorie reduction and 30 minutes of exercise daily. Discussed impact of obesity on general health.                 Patient requested same day appointment today to obtain CPAP machine. At time of this appointment Shannan was not able to accommodate with insurance approval or appointment at [facility] or at [facility] locations. Provided contact info for [facility] and Jayshree to assist with expediting patient obtaining CPAP machine asap.   He states he spoke with his insurance carrier and was told they provide coverage for CPAP with an order and to contact Aishwarya Lowe or Tawnya once he obtained order. He was hoping to be able to obtain CPAP with [facility].     Follow up in about 9 weeks (around 12/20/2019) for CPAP compliance download after initial set up.    Thank you for the opportunity to participate in the care of this patient.

## 2019-10-18 NOTE — ASSESSMENT & PLAN NOTE
Home sleep study 10/10/2019 severe obstructive sleep apnea 48.  Overall RDI 71.  Mean O2 sat 91.9%.   10/18/2019 orders auto CPAP 6-20 cm. Full face mask.

## 2019-10-18 NOTE — LETTER
October 18, 2019      Avila Nicholas MD  [address]  [facility] [address]           [facility]  [address] [place] [address]  Phone: [phone]  Fax: [phone]          Patient: Sergio Parra   MR Number: [id number]   YOB: 1975   Date of Visit: 10/18/2019       Dear Dr. Avila Nicholas:    Thank you for referring Sergio Parra to me for evaluation. Attached you will find relevant portions of my assessment and plan of care.    If you have questions, please do not hesitate to call me. I look forward to following Sergio Parra along with you.    Sincerely,    Claudia Garcia, NP    Enclosure  CC:  No Recipients    If you would like to receive this communication electronically, please contact [email or web address] or [phone] to request more information on [employer] access.    For providers and/or their staff who would like to refer a patient to [facility], please contact us through our one-stop-shop provider referral line, [facility], at [phone].    If you feel you have received this communication in error or would no longer like to receive these types of communications, please e-mail [email or web address]

## 2019-10-28 ENCOUNTER — TELEPHONE (OUTPATIENT)
Dept: PULMONOLOGY | Facility: CLINIC | Age: 44
End: 2019-10-28

## 2019-10-28 NOTE — TELEPHONE ENCOUNTER
----- Message from Princess Andrade sent at 10/28/2019 11:24 AM CDT -----  Contact: pt  Pt request call back regarding getting a CPAP machine ASAP ... Call back: [phone] (home)

## 2020-01-16 ENCOUNTER — TELEPHONE (OUTPATIENT)
Dept: PULMONOLOGY | Facility: CLINIC | Age: 45
End: 2020-01-16

## 2020-01-16 NOTE — TELEPHONE ENCOUNTER
Called patient to schedule a follow up appt for cpap complaince appt. Patient he will call back adri reschedule an appt.

## 2020-01-16 NOTE — TELEPHONE ENCOUNTER
----- Message from Dulce Maria Maddox sent at 1/16/2020 10:03 AM CST -----  Regarding: Appointment Needed  Gema Campbell!    Mr Parra has not followed up with Jennie since getting PAP Equipment in October. Please call him to schedule a follow up with Jennie for ASAP so he can be compliant.    Thanks,  Dulce Maria Calvo, CRT-SDS

## 2020-01-17 DIAGNOSIS — F41.9 ANXIETY: ICD-10-CM

## 2020-01-17 NOTE — TELEPHONE ENCOUNTER
----- Message from Khadijah Melgar sent at 1/17/2020  2:51 PM CST -----  Contact: Patient  Patient is going to [place] at the end of the month and need to know his blood type for some paperwork to leave the country. Please call to advise at Ph .[phone] (home)

## 2020-01-17 NOTE — TELEPHONE ENCOUNTER
----- Message from Khadijah Melgar sent at 1/17/2020  2:47 PM CST -----  Contact: Patient  .Type:  RX Refill Request    Who Called: Patient  Refill or New Rx: Refill  RX Name and Strength:ALPRAZolam (XANAX) 1 MG tablet  How is the patient currently taking it? (ex. 1XDay):  Is this a 30 day or 90 day RX:   Preferred Pharmacy with phone number: .    [facility] [address] [place] [address]  Phone: [phone] Fax: [phone]    Local or Mail Order:Local  Ordering Provider: Yu  Would the patient rather a call back or a response via [facility]?  Call  Best Call Back Number: .[phone] (home)   Additional Information:

## 2020-01-19 RX ORDER — ALPRAZOLAM 1 MG/1
1 TABLET ORAL DAILY PRN
Qty: 30 TABLET | Refills: 1 | Status: SHIPPED | OUTPATIENT
Start: 2020-01-19 | End: 2020-11-30 | Stop reason: SDUPTHER

## 2020-01-24 ENCOUNTER — TELEPHONE (OUTPATIENT)
Dept: FAMILY MEDICINE | Facility: CLINIC | Age: 45
End: 2020-01-24

## 2020-01-24 NOTE — TELEPHONE ENCOUNTER
----- Message from Arline Chung sent at 1/24/2020  1:21 PM CST -----  Contact: Pt  Please give pt a call at .[phone] (home) regarding his medication still not being called into the pharmacy

## 2020-01-24 NOTE — TELEPHONE ENCOUNTER
----- Message from Arline Chung sent at 1/24/2020  1:21 PM CST -----  Contact: Pt  Please give pt a call at .[phone] (home) regarding his medication still not being called into the pharmacy

## 2020-01-24 NOTE — TELEPHONE ENCOUNTER
----- Message from Khadijah Melgar sent at 1/24/2020  2:07 PM CST -----  Contact: Patient  .Type:  RX Refill Request    Who Called: Patient  Refill or New Rx Refill  RX Name and Strength: ALPRAZolam (XANAX) 1 MG tablet  How is the patient currently taking it? (ex. 1XDay):  Is this a 30 day or 90 day RX:   Preferred Pharmacy with phone number: .    [facility] [address] [place] [address]  Phone: [phone] Fax: [phone]    Local or Mail Order:Local   Ordering Provider: Yu  Would the patient rather a call back or a response via [facility]? Call  Best Call Back Number: .[phone]   Additional Information:

## 2020-02-11 ENCOUNTER — OFFICE VISIT (OUTPATIENT)
Dept: INTERNAL MEDICINE | Facility: CLINIC | Age: 45
End: 2020-02-11
Payer: COMMERCIAL

## 2020-02-11 VITALS
TEMPERATURE: 98 F | HEART RATE: 104 BPM | WEIGHT: 315 LBS | HEIGHT: 66 IN | BODY MASS INDEX: 50.62 KG/M2 | SYSTOLIC BLOOD PRESSURE: 132 MMHG | DIASTOLIC BLOOD PRESSURE: 92 MMHG | OXYGEN SATURATION: 97 %

## 2020-02-11 DIAGNOSIS — J01.90 ACUTE SINUSITIS, RECURRENCE NOT SPECIFIED, UNSPECIFIED LOCATION: Primary | ICD-10-CM

## 2020-02-11 PROCEDURE — 96372 PR INJECTION,THERAP/PROPH/DIAG2ST, IM OR SUBCUT: ICD-10-PCS | Mod: S$GLB,,, | Performed by: PHYSICIAN ASSISTANT

## 2020-02-11 PROCEDURE — 99999 PR PBB SHADOW E&M-EST. PATIENT-LVL III: ICD-10-PCS | Mod: PBBFAC,,, | Performed by: PHYSICIAN ASSISTANT

## 2020-02-11 PROCEDURE — 99214 OFFICE O/P EST MOD 30 MIN: CPT | Mod: 25,S$GLB,, | Performed by: PHYSICIAN ASSISTANT

## 2020-02-11 PROCEDURE — 96372 THER/PROPH/DIAG INJ SC/IM: CPT | Mod: S$GLB,,, | Performed by: PHYSICIAN ASSISTANT

## 2020-02-11 PROCEDURE — 3008F PR BODY MASS INDEX (BMI) DOCUMENTED: ICD-10-PCS | Mod: CPTII,S$GLB,, | Performed by: PHYSICIAN ASSISTANT

## 2020-02-11 PROCEDURE — 99999 PR PBB SHADOW E&M-EST. PATIENT-LVL III: CPT | Mod: PBBFAC,,, | Performed by: PHYSICIAN ASSISTANT

## 2020-02-11 PROCEDURE — 99214 PR OFFICE/OUTPT VISIT, EST, LEVL IV, 30-39 MIN: ICD-10-PCS | Mod: 25,S$GLB,, | Performed by: PHYSICIAN ASSISTANT

## 2020-02-11 PROCEDURE — 3008F BODY MASS INDEX DOCD: CPT | Mod: CPTII,S$GLB,, | Performed by: PHYSICIAN ASSISTANT

## 2020-02-11 RX ORDER — BETAMETHASONE SODIUM PHOSPHATE AND BETAMETHASONE ACETATE 3; 3 MG/ML; MG/ML
6 INJECTION, SUSPENSION INTRA-ARTICULAR; INTRALESIONAL; INTRAMUSCULAR; SOFT TISSUE
Status: COMPLETED | OUTPATIENT
Start: 2020-02-11 | End: 2020-02-11

## 2020-02-11 RX ORDER — DOXYCYCLINE 100 MG/1
100 CAPSULE ORAL 2 TIMES DAILY
Qty: 20 CAPSULE | Refills: 0 | Status: SHIPPED | OUTPATIENT
Start: 2020-02-11 | End: 2020-02-21

## 2020-02-11 RX ADMIN — BETAMETHASONE SODIUM PHOSPHATE AND BETAMETHASONE ACETATE 6 MG: 3; 3 INJECTION, SUSPENSION INTRA-ARTICULAR; INTRALESIONAL; INTRAMUSCULAR; SOFT TISSUE at 04:02

## 2020-02-11 NOTE — PROGRESS NOTES
"  Subjective:      Patient ID: Sergio Parra is a 44 y.o. male.    Chief Complaint: Sinusitis    Sinus Problem   This is a new problem. The current episode started in the past 7 days. The problem has been gradually worsening since onset. There has been no fever. Associated symptoms include congestion, coughing, sinus pressure and a sore throat. Pertinent negatives include no chills, diaphoresis, ear pain, headaches, hoarse voice, neck pain, shortness of breath, sneezing or swollen glands. Treatments tried: claritin D, daquil. The treatment provided moderate relief.       Review of Systems   Constitutional: Positive for activity change, appetite change and fatigue. Negative for chills, diaphoresis, fever and unexpected weight change.   HENT: Positive for congestion, postnasal drip, rhinorrhea, sinus pressure and sore throat. Negative for dental problem, drooling, ear discharge, ear pain, facial swelling, hearing loss, hoarse voice, mouth sores, nosebleeds, sneezing and trouble swallowing.    Eyes: Negative for pain, discharge, redness, itching and visual disturbance.   Respiratory: Positive for cough. Negative for chest tightness, shortness of breath and wheezing.    Cardiovascular: Negative for chest pain, palpitations and leg swelling.   Gastrointestinal: Negative for abdominal pain, constipation, diarrhea, nausea and vomiting.   Endocrine: Negative.    Genitourinary: Negative.  Negative for difficulty urinating.   Musculoskeletal: Positive for myalgias. Negative for neck pain and neck stiffness.   Skin: Negative for rash.   Allergic/Immunologic: Negative for environmental allergies, food allergies and immunocompromised state.   Neurological: Negative for dizziness, weakness and headaches.     Objective:   BP (!) 132/92 (BP Location: Left arm, Patient Position: Sitting, BP Method: Large (Manual))   Pulse 104   Temp 97.7 °F (36.5 °C) (Tympanic)   Ht 5' 6" (1.676 m)   Wt (!) 145.2 kg (320 lb 1.7 oz)   SpO2 " 97%   BMI 51.67 kg/m²     Physical Exam   Constitutional: He is oriented to person, place, and time. He appears well-developed and well-nourished. No distress.   HENT:   Head: Normocephalic and atraumatic.   Right Ear: Hearing, tympanic membrane, external ear and ear canal normal. No tenderness.   Left Ear: Hearing, tympanic membrane, external ear and ear canal normal. No tenderness.   Nose: Mucosal edema and rhinorrhea present. No sinus tenderness. Right sinus exhibits maxillary sinus tenderness and frontal sinus tenderness. Left sinus exhibits maxillary sinus tenderness and frontal sinus tenderness.   Mouth/Throat: Uvula is midline and mucous membranes are normal. No oral lesions. Normal dentition. No dental abscesses, uvula swelling or dental caries. Oropharyngeal exudate and posterior oropharyngeal erythema present. No posterior oropharyngeal edema or tonsillar abscesses. No tonsillar exudate.   Eyes: Pupils are equal, round, and reactive to light. Conjunctivae and EOM are normal. Right eye exhibits no discharge. Left eye exhibits no discharge.   Neck: Normal range of motion. Neck supple.   Cardiovascular: Normal rate, regular rhythm and normal heart sounds. Exam reveals no gallop and no friction rub.   No murmur heard.  Pulmonary/Chest: Effort normal and breath sounds normal. No respiratory distress. He has no wheezes. He has no rales.   Lymphadenopathy:     He has no cervical adenopathy.   Neurological: He is alert and oriented to person, place, and time.   Skin: Skin is warm. No rash noted. He is not diaphoretic. No erythema. No pallor.   Psychiatric: He has a normal mood and affect. His behavior is normal. Judgment and thought content normal.   Nursing note and vitals reviewed.      Assessment:     1. Acute sinusitis, recurrence not specified, unspecified location      Plan:   Acute sinusitis, recurrence not specified, unspecified location  -     betamethasone acetate-betamethasone sodium phosphate  injection 6 mg  -     doxycycline (MONODOX) 100 MG capsule; Take 1 capsule (100 mg total) by mouth 2 (two) times daily. Do not take with milk or yogurt for 10 days  Dispense: 20 capsule; Refill: 0    -continue otc multi symptom relief meds    Follow up if symptoms worsen or fail to improve.

## 2020-03-09 ENCOUNTER — LAB VISIT (OUTPATIENT)
Dept: LAB | Facility: HOSPITAL | Age: 45
End: 2020-03-09
Attending: FAMILY MEDICINE
Payer: COMMERCIAL

## 2020-03-09 ENCOUNTER — OFFICE VISIT (OUTPATIENT)
Dept: INTERNAL MEDICINE | Facility: CLINIC | Age: 45
End: 2020-03-09
Payer: COMMERCIAL

## 2020-03-09 VITALS
DIASTOLIC BLOOD PRESSURE: 82 MMHG | BODY MASS INDEX: 50.62 KG/M2 | TEMPERATURE: 98 F | HEART RATE: 97 BPM | SYSTOLIC BLOOD PRESSURE: 118 MMHG | WEIGHT: 315 LBS | OXYGEN SATURATION: 97 % | HEIGHT: 66 IN

## 2020-03-09 DIAGNOSIS — M79.89 SWELLING OF RIGHT FOOT: Primary | ICD-10-CM

## 2020-03-09 DIAGNOSIS — M79.89 SWELLING OF RIGHT FOOT: ICD-10-CM

## 2020-03-09 LAB
ALBUMIN SERPL BCP-MCNC: 3.9 G/DL (ref 3.5–5.2)
ALP SERPL-CCNC: 72 U/L (ref 55–135)
ALT SERPL W/O P-5'-P-CCNC: 44 U/L (ref 10–44)
ANION GAP SERPL CALC-SCNC: 9 MMOL/L (ref 8–16)
AST SERPL-CCNC: 31 U/L (ref 10–40)
BILIRUB SERPL-MCNC: 0.3 MG/DL (ref 0.1–1)
BUN SERPL-MCNC: 12 MG/DL (ref 6–20)
CALCIUM SERPL-MCNC: 9.8 MG/DL (ref 8.7–10.5)
CHLORIDE SERPL-SCNC: 102 MMOL/L (ref 95–110)
CO2 SERPL-SCNC: 27 MMOL/L (ref 23–29)
CREAT SERPL-MCNC: 0.9 MG/DL (ref 0.5–1.4)
EST. GFR  (AFRICAN AMERICAN): >60 ML/MIN/1.73 M^2
EST. GFR  (NON AFRICAN AMERICAN): >60 ML/MIN/1.73 M^2
GLUCOSE SERPL-MCNC: 106 MG/DL (ref 70–110)
POTASSIUM SERPL-SCNC: 4.5 MMOL/L (ref 3.5–5.1)
PROT SERPL-MCNC: 7.7 G/DL (ref 6–8.4)
SODIUM SERPL-SCNC: 138 MMOL/L (ref 136–145)

## 2020-03-09 PROCEDURE — 83036 HEMOGLOBIN GLYCOSYLATED A1C: CPT

## 2020-03-09 PROCEDURE — 99213 OFFICE O/P EST LOW 20 MIN: CPT | Mod: S$GLB,,, | Performed by: FAMILY MEDICINE

## 2020-03-09 PROCEDURE — 99213 PR OFFICE/OUTPT VISIT, EST, LEVL III, 20-29 MIN: ICD-10-PCS | Mod: S$GLB,,, | Performed by: FAMILY MEDICINE

## 2020-03-09 PROCEDURE — 3008F BODY MASS INDEX DOCD: CPT | Mod: CPTII,S$GLB,, | Performed by: FAMILY MEDICINE

## 2020-03-09 PROCEDURE — 99999 PR PBB SHADOW E&M-EST. PATIENT-LVL III: ICD-10-PCS | Mod: PBBFAC,,, | Performed by: FAMILY MEDICINE

## 2020-03-09 PROCEDURE — 83880 ASSAY OF NATRIURETIC PEPTIDE: CPT

## 2020-03-09 PROCEDURE — 99999 PR PBB SHADOW E&M-EST. PATIENT-LVL III: CPT | Mod: PBBFAC,,, | Performed by: FAMILY MEDICINE

## 2020-03-09 PROCEDURE — 3008F PR BODY MASS INDEX (BMI) DOCUMENTED: ICD-10-PCS | Mod: CPTII,S$GLB,, | Performed by: FAMILY MEDICINE

## 2020-03-09 PROCEDURE — 80053 COMPREHEN METABOLIC PANEL: CPT

## 2020-03-09 PROCEDURE — 36415 COLL VENOUS BLD VENIPUNCTURE: CPT | Mod: PO

## 2020-03-09 RX ORDER — FUROSEMIDE 20 MG/1
20 TABLET ORAL 2 TIMES DAILY PRN
Qty: 60 TABLET | Refills: 1 | Status: SHIPPED | OUTPATIENT
Start: 2020-03-09 | End: 2021-08-11

## 2020-03-09 NOTE — PROGRESS NOTES
Subjective:      Patient ID: Sergio Parra is a 44 y.o. male.    Chief Complaint: Edema (right foot )      Patient reports pain and swelling of the right foot and ankle.  he woke up with the symptoms 3 days ago.  He denies any recent injury or trauma to the area.    Review of Systems   Cardiovascular: Positive for leg swelling.   Musculoskeletal: Positive for arthralgias and gait problem.     Past Medical History:   Diagnosis Date    Anxiety     Hypercholesteremia     Obesity           Past Surgical History:   Procedure Laterality Date    ANTERIOR CRUCIATE LIGAMENT REPAIR       Family History   Problem Relation Age of Onset    No Known Problems Mother     No Known Problems Father      Social History     Socioeconomic History    Marital status: Single     Spouse name: Not on file    Number of children: Not on file    Years of education: Not on file    Highest education level: Not on file   Occupational History    Not on file   Social Needs    Financial resource strain: Not on file    Food insecurity:     Worry: Not on file     Inability: Not on file    Transportation needs:     Medical: Not on file     Non-medical: Not on file   Tobacco Use    Smoking status: Never Smoker    Smokeless tobacco: Never Used   Substance and Sexual Activity    Alcohol use: Yes     Alcohol/week: 0.0 standard drinks    Drug use: No    Sexual activity: Yes     Partners: Female   Lifestyle    Physical activity:     Days per week: Not on file     Minutes per session: Not on file    Stress: To some extent   Relationships    Social connections:     Talks on phone: Not on file     Gets together: Not on file     Attends [employer] service: Not on file     Active member of club or organization: Not on file     Attends meetings of clubs or organizations: Not on file     Relationship status: Not on file   Other Topics Concern    Not on file   Social History Narrative    Not on file     Review of patient's allergies  "indicates:  No Known Allergies    Objective:       /82 (BP Location: Right arm, Patient Position: Sitting, BP Method: X-Large (Manual))   Pulse 97   Temp 98.2 °F (36.8 °C) (Tympanic)   Ht 5' 6" (1.676 m)   Wt (!) 147.1 kg (324 lb 4.8 oz)   SpO2 97%   BMI 52.34 kg/m²   Physical Exam   Constitutional: He appears well-developed and well-nourished. No distress.   Cardiovascular: Normal rate, regular rhythm and normal heart sounds.   Pulmonary/Chest: Effort normal and breath sounds normal. No respiratory distress.   Musculoskeletal: Normal range of motion. He exhibits edema (Right foot and lower leg, 1+ pitting edema) and tenderness (Right foot and ankle). He exhibits no deformity.   Skin: He is not diaphoretic. No erythema.   Psychiatric: He has a normal mood and affect. His behavior is normal.   Vitals reviewed.    Assessment:     1. Swelling of right foot      Plan:   Swelling of right foot  -     Hemoglobin A1c; Future; Expected date: 03/09/2020  -     Brain natriuretic peptide; Future; Expected date: 03/09/2020  -     Comprehensive metabolic panel; Future; Expected date: 09/05/2020  -     US Lower Extremity Veins Right; Future; Expected date: 03/09/2020    Other orders  -     furosemide (LASIX) 20 MG tablet; Take 1 tablet (20 mg total) by mouth 2 (two) times daily as needed (swelling).  Dispense: 60 tablet; Refill: 1      Medication List with Changes/Refills   New Medications    FUROSEMIDE (LASIX) 20 MG TABLET    Take 1 tablet (20 mg total) by mouth 2 (two) times daily as needed (swelling).   Current Medications    ALPRAZOLAM (XANAX) 1 MG TABLET    Take 1 tablet (1 mg total) by mouth daily as needed for Anxiety.     "

## 2020-03-10 ENCOUNTER — TELEPHONE (OUTPATIENT)
Dept: RADIOLOGY | Facility: HOSPITAL | Age: 45
End: 2020-03-10

## 2020-03-10 ENCOUNTER — TELEPHONE (OUTPATIENT)
Dept: INTERNAL MEDICINE | Facility: CLINIC | Age: 45
End: 2020-03-10

## 2020-03-10 LAB
BNP SERPL-MCNC: <10 PG/ML (ref 0–99)
ESTIMATED AVG GLUCOSE: 120 MG/DL (ref 68–131)
HBA1C MFR BLD HPLC: 5.8 % (ref 4–5.6)

## 2020-03-10 NOTE — TELEPHONE ENCOUNTER
----- Message from Kayla Perez MD sent at 3/10/2020  8:57 AM CDT -----  Please let them know that his labs show his swelling is not from his heart or kidneys, he is prediabetic, needs to watch carb intake, but that wouldn't be high enough to cause his symptoms.

## 2020-03-11 ENCOUNTER — HOSPITAL ENCOUNTER (OUTPATIENT)
Dept: RADIOLOGY | Facility: HOSPITAL | Age: 45
Discharge: HOME | End: 2020-03-11
Attending: FAMILY MEDICINE
Payer: COMMERCIAL

## 2020-03-11 DIAGNOSIS — M79.89 SWELLING OF RIGHT FOOT: ICD-10-CM

## 2020-03-11 PROCEDURE — 93971 EXTREMITY STUDY: CPT | Mod: TC,RT

## 2020-03-11 PROCEDURE — 93971 US LOWER EXTREMITY VEINS RIGHT: ICD-10-PCS | Mod: 26,RT,, | Performed by: RADIOLOGY

## 2020-03-11 PROCEDURE — 93971 EXTREMITY STUDY: CPT | Mod: 26,RT,, | Performed by: RADIOLOGY

## 2020-06-19 ENCOUNTER — TELEPHONE (OUTPATIENT)
Dept: INTERNAL MEDICINE | Facility: CLINIC | Age: 45
End: 2020-06-19

## 2020-06-19 ENCOUNTER — OFFICE VISIT (OUTPATIENT)
Dept: INTERNAL MEDICINE | Facility: CLINIC | Age: 45
End: 2020-06-19
Payer: COMMERCIAL

## 2020-06-19 DIAGNOSIS — N52.9 ERECTILE DYSFUNCTION, UNSPECIFIED ERECTILE DYSFUNCTION TYPE: ICD-10-CM

## 2020-06-19 DIAGNOSIS — G47.00 INSOMNIA, UNSPECIFIED TYPE: Primary | ICD-10-CM

## 2020-06-19 PROCEDURE — 99213 PR OFFICE/OUTPT VISIT, EST, LEVL III, 20-29 MIN: ICD-10-PCS | Mod: 95,,, | Performed by: FAMILY MEDICINE

## 2020-06-19 PROCEDURE — 99213 OFFICE O/P EST LOW 20 MIN: CPT | Mod: 95,,, | Performed by: FAMILY MEDICINE

## 2020-06-19 RX ORDER — SILDENAFIL 100 MG/1
100 TABLET, FILM COATED ORAL DAILY PRN
Qty: 10 TABLET | Refills: 0 | Status: SHIPPED | OUTPATIENT
Start: 2020-06-19 | End: 2020-11-30 | Stop reason: SDUPTHER

## 2020-06-19 RX ORDER — ESZOPICLONE 2 MG/1
2 TABLET, FILM COATED ORAL NIGHTLY
Qty: 10 TABLET | Refills: 0 | Status: SHIPPED | OUTPATIENT
Start: 2020-06-19 | End: 2020-06-29 | Stop reason: SDUPTHER

## 2020-06-19 NOTE — TELEPHONE ENCOUNTER
----- Message from Chelsy Newton sent at 6/19/2020 10:13 AM CDT -----  Regarding: Same Day Appt  Pt is requesting call back in regards to scheduling same day appt for insomnia          Pls call back at [phone]

## 2020-06-19 NOTE — PROGRESS NOTES
The patient location is: home  The chief complaint leading to consultation is: insomnia    Visit type: audiovisual    Face to Face time with patient: 15 minutes  16 minutes of total time spent on the encounter, which includes face to face time and non-face to face time preparing to see the patient (eg, review of tests), Obtaining and/or reviewing separately obtained history, Documenting clinical information in the electronic or other health record, Independently interpreting results (not separately reported) and communicating results to the patient/family/caregiver, or Care coordination (not separately reported).         Each patient to whom he or she provides medical services by telemedicine is:  (1) informed of the relationship between the physician and patient and the respective role of any other health care provider with respect to management of the patient; and (2) notified that he or she may decline to receive medical services by telemedicine and may withdraw from such care at any time.    Notes:   Subjective:      Patient ID: Sergio Parra is a 45 y.o. male.    Chief Complaint: Insomnia      Patient reports recent insomnia, has difficulty with frequent awakenings during the night. He is using his CPAP, also takes melatonin at bedtime which helps him fall asleep but then usually wakes up several times during the night.   He is also reports ED. He has never had his testosterone levels checked in the past.     Review of Systems   Psychiatric/Behavioral: Positive for sleep disturbance. Negative for dysphoric mood. The patient is not nervous/anxious.      Past Medical History:   Diagnosis Date    Anxiety     Hypercholesteremia     Obesity           Past Surgical History:   Procedure Laterality Date    ANTERIOR CRUCIATE LIGAMENT REPAIR       Family History   Problem Relation Age of Onset    No Known Problems Mother     No Known Problems Father      Social History     Socioeconomic History    Marital  status: Single     Spouse name: Not on file    Number of children: Not on file    Years of education: Not on file    Highest education level: Not on file   Occupational History    Not on file   Social Needs    Financial resource strain: Not on file    Food insecurity     Worry: Not on file     Inability: Not on file    Transportation needs     Medical: Not on file     Non-medical: Not on file   Tobacco Use    Smoking status: Never Smoker    Smokeless tobacco: Never Used   Substance and Sexual Activity    Alcohol use: Yes     Alcohol/week: 0.0 standard drinks    Drug use: No    Sexual activity: Yes     Partners: Female   Lifestyle    Physical activity     Days per week: Not on file     Minutes per session: Not on file    Stress: To some extent   Relationships    Social connections     Talks on phone: Not on file     Gets together: Not on file     Attends Holiness service: Not on file     Active member of club or organization: Not on file     Attends meetings of clubs or organizations: Not on file     Relationship status: Not on file   Other Topics Concern    Not on file   Social History Narrative    Not on file     Review of patient's allergies indicates:  No Known Allergies    Objective:       There were no vitals taken for this visit.  Physical Exam  Constitutional:       General: He is not in acute distress.     Appearance: Normal appearance. He is well-developed. He is not ill-appearing or diaphoretic.   Neurological:      Mental Status: He is alert and oriented to person, place, and time.   Psychiatric:         Mood and Affect: Mood normal.         Behavior: Behavior normal.         Thought Content: Thought content normal.         Judgment: Judgment normal.       Assessment:     1. Insomnia, unspecified type    2. Erectile dysfunction, unspecified erectile dysfunction type      Plan:   Insomnia, unspecified type    Erectile dysfunction, unspecified erectile dysfunction type  -     Testosterone  Panel; Future; Expected date: 09/17/2020  -     Lipid Panel; Future; Expected date: 06/19/2020    Other orders  -     eszopiclone (LUNESTA) 2 MG Tab; Take 1 tablet (2 mg total) by mouth every evening.  Dispense: 10 tablet; Refill: 0  -     sildenafiL (VIAGRA) 100 MG tablet; Take 1 tablet (100 mg total) by mouth daily as needed for Erectile Dysfunction.  Dispense: 10 tablet; Refill: 0      Medication List with Changes/Refills   New Medications    ESZOPICLONE (LUNESTA) 2 MG TAB    Take 1 tablet (2 mg total) by mouth every evening.    SILDENAFIL (VIAGRA) 100 MG TABLET    Take 1 tablet (100 mg total) by mouth daily as needed for Erectile Dysfunction.   Current Medications    ALPRAZOLAM (XANAX) 1 MG TABLET    Take 1 tablet (1 mg total) by mouth daily as needed for Anxiety.    FUROSEMIDE (LASIX) 20 MG TABLET    Take 1 tablet (20 mg total) by mouth 2 (two) times daily as needed (swelling).

## 2020-06-29 RX ORDER — ESZOPICLONE 2 MG/1
2 TABLET, FILM COATED ORAL NIGHTLY
Qty: 10 TABLET | Refills: 0 | Status: SHIPPED | OUTPATIENT
Start: 2020-06-29 | End: 2020-06-30 | Stop reason: SDUPTHER

## 2020-06-29 NOTE — TELEPHONE ENCOUNTER
----- Message from Quirino Mcwilliams sent at 6/29/2020  2:16 PM CDT -----  Contact: self  Type:  RX Refill Request    Who Called: Sergio Parra   Refill or New Rx:refill  RX Name and Strength:Lunesta  How is the patient currently taking it? (ex. 1XDay):1Xday  Is this a 30 day or 90 day RX:30  Preferred Pharmacy with phone number:  [facility] [address] [facility]  [address] [place] [address]  Phone: [phone] Fax: [phone]  Local or Mail Order:local  Ordering Provider:Ana  Would the patient rather a call back or a response via [facility]? Call back  Best Call Back Number:[phone]  Additional Information: pt states medication is working well and would like to know if the dosage can be increased. If not it is urgent that he gets the refill today.  Thanks

## 2020-06-30 RX ORDER — ESZOPICLONE 2 MG/1
2 TABLET, FILM COATED ORAL NIGHTLY
Qty: 30 TABLET | Refills: 5 | Status: SHIPPED | OUTPATIENT
Start: 2020-06-30 | End: 2020-07-06 | Stop reason: SDUPTHER

## 2020-06-30 NOTE — TELEPHONE ENCOUNTER
----- Message from Valdemar Crisostomo sent at 6/30/2020  2:06 PM CDT -----  .Type:  Needs Medical Advice    Who Called: AYESHA CRAIN   Symptoms (please be specific):   How long has patient had these symptoms:   Pharmacy name and phone #:   Would the patient rather a call back or a response via [facility]?  Call   Best Call Back Number:  [phone] (home)    Additional Information:   Pt is requesting a call back from the nurse in regards to the pt getting a 10 day supply of his med LUNESTA 2 MG please

## 2020-07-06 ENCOUNTER — TELEPHONE (OUTPATIENT)
Dept: INTERNAL MEDICINE | Facility: CLINIC | Age: 45
End: 2020-07-06

## 2020-07-06 RX ORDER — ESZOPICLONE 2 MG/1
2 TABLET, FILM COATED ORAL NIGHTLY
Qty: 30 TABLET | Refills: 5 | Status: SHIPPED | OUTPATIENT
Start: 2020-07-06 | End: 2020-08-06 | Stop reason: SDUPTHER

## 2020-07-06 NOTE — TELEPHONE ENCOUNTER
----- Message from Patricia Dykes sent at 7/6/2020 12:38 PM CDT -----  .Type:  RX Refill Request    Who Called:  pt   Refill or New Rx: refill   RX Name and Strength:unesta  How is the patient currently taking it? (ex. 1XDay): day   Is this a 30 day or 90 day RX: 30  Preferred Pharmacy with phone number: ..  [facility] [address] [place] [address]  Phone: [phone] Fax: [phone]           Local or Mail Order:local  Ordering Provider:  Would the patient rather a call back or a response via [facility]?  Call back   Best Call Back Number: [phone]  Additional Information:

## 2020-08-06 RX ORDER — ESZOPICLONE 2 MG/1
2 TABLET, FILM COATED ORAL NIGHTLY
Qty: 30 TABLET | Refills: 5 | Status: SHIPPED | OUTPATIENT
Start: 2020-08-06 | End: 2021-02-04

## 2020-08-06 NOTE — TELEPHONE ENCOUNTER
----- Message from Romi Hendricks sent at 8/6/2020 11:52 AM CDT -----  Contact: Self [phone]  Requesting an RX refill or new RX.  Is this a refill or new RX:  refill  RX name and strength: eszopiclone (LUNESTA) 2 MG Tab   Directions (copy/paste from chart):    Is this a 30 day or 90 day RX:  90 day  Local pharmacy or mail order pharmacy:  local  Pharmacy name and phone # (copy/paste from chart):   [address] [phone] (Phone)  [phone] (Fax)  Comments:

## 2020-11-24 ENCOUNTER — TELEPHONE (OUTPATIENT)
Dept: INTERNAL MEDICINE | Facility: CLINIC | Age: 45
End: 2020-11-24

## 2020-11-24 NOTE — TELEPHONE ENCOUNTER
----- Message from Roxanne Juares sent at 11/24/2020  3:47 PM CST -----  Contact: Patient [phone]  Requesting to change Rx from eszopiclone (LUNESTA) 2 MG Tab  to Ambien.    [facility] [address] [place] [address] [facility]  [address] [facility] [address]  Phone: [phone] Fax: [phone]    Please call and advise.    Thank You

## 2020-11-24 NOTE — TELEPHONE ENCOUNTER
He is asking for a change in his sleep medication from Lunesta to Ambien but you have not seen him since June, he is right under 6 months consuelo  I do not see where he has ever taken Ambien .  He will need a visit?

## 2020-11-25 NOTE — TELEPHONE ENCOUNTER
Spoke with pt inform visit is recommended by Dr. Perez; Pt verbalized understanding and agreed to an virtual on 11/30/20; appt booked

## 2020-11-30 ENCOUNTER — OFFICE VISIT (OUTPATIENT)
Dept: INTERNAL MEDICINE | Facility: CLINIC | Age: 45
End: 2020-11-30
Payer: COMMERCIAL

## 2020-11-30 DIAGNOSIS — Z53.21 PATIENT LEFT WITHOUT BEING SEEN: Primary | ICD-10-CM

## 2020-11-30 DIAGNOSIS — F41.9 ANXIETY: ICD-10-CM

## 2020-11-30 DIAGNOSIS — N52.9 ERECTILE DYSFUNCTION, UNSPECIFIED ERECTILE DYSFUNCTION TYPE: ICD-10-CM

## 2020-11-30 DIAGNOSIS — G47.00 INSOMNIA, UNSPECIFIED TYPE: Primary | ICD-10-CM

## 2020-11-30 PROCEDURE — 99499 UNLISTED E&M SERVICE: CPT | Mod: 95,,, | Performed by: FAMILY MEDICINE

## 2020-11-30 PROCEDURE — 99499 NO LOS: ICD-10-PCS | Mod: 95,,, | Performed by: FAMILY MEDICINE

## 2020-11-30 PROCEDURE — 99213 PR OFFICE/OUTPT VISIT, EST, LEVL III, 20-29 MIN: ICD-10-PCS | Mod: 95,,, | Performed by: FAMILY MEDICINE

## 2020-11-30 PROCEDURE — 99213 OFFICE O/P EST LOW 20 MIN: CPT | Mod: 95,,, | Performed by: FAMILY MEDICINE

## 2020-11-30 RX ORDER — TRAZODONE HYDROCHLORIDE 100 MG/1
100 TABLET ORAL NIGHTLY
Qty: 30 TABLET | Refills: 11 | Status: SHIPPED | OUTPATIENT
Start: 2020-11-30 | End: 2021-08-11

## 2020-11-30 RX ORDER — ALPRAZOLAM 1 MG/1
1 TABLET ORAL DAILY PRN
Qty: 30 TABLET | Refills: 1 | Status: SHIPPED | OUTPATIENT
Start: 2020-11-30 | End: 2021-04-27 | Stop reason: SDUPTHER

## 2020-11-30 RX ORDER — SILDENAFIL 100 MG/1
100 TABLET, FILM COATED ORAL DAILY PRN
Qty: 15 TABLET | Refills: 6 | Status: SHIPPED | OUTPATIENT
Start: 2020-11-30 | End: 2021-08-11 | Stop reason: SDUPTHER

## 2020-11-30 NOTE — PROGRESS NOTES
The patient location is: [facility]  The chief complaint leading to consultation is: follow up , insomnia    Visit type: audiovisual    Face to Face time with patient: 8 minutes  10 minutes of total time spent on the encounter, which includes face to face time and non-face to face time preparing to see the patient (eg, review of tests), Obtaining and/or reviewing separately obtained history, Documenting clinical information in the electronic or other health record, Independently interpreting results (not separately reported) and communicating results to the patient/family/caregiver, or Care coordination (not separately reported).         Each patient to whom he or she provides medical services by telemedicine is:  (1) informed of the relationship between the physician and patient and the respective role of any other health care provider with respect to management of the patient; and (2) notified that he or she may decline to receive medical services by telemedicine and may withdraw from such care at any time.    Notes:     Subjective:      Patient ID: Sergio Parra is a 45 y.o. male.    Chief Complaint: No chief complaint on file.      Virtual visit for routine follow up.   Patient reports insomnia not well controlled - taking lunesta 2mg, finds he can fall asleep but then wakes up after a few hours and cannot go back to sleep. Has not tried any other meds for insomnia other than OTC, has been taking melatonin at times as well.   Also requesting refill on alprazolam, sildenafil - they are working for him, no problems.     Review of Systems   Constitutional: Negative for activity change and unexpected weight change.   HENT: Negative for hearing loss, rhinorrhea and trouble swallowing.    Eyes: Negative for discharge and visual disturbance.   Respiratory: Negative for chest tightness and wheezing.    Cardiovascular: Negative for chest pain and palpitations.   Gastrointestinal: Negative for blood in stool,  constipation, diarrhea and vomiting.   Endocrine: Negative for polydipsia and polyuria.   Genitourinary: Negative for difficulty urinating, hematuria and urgency.   Musculoskeletal: Negative for arthralgias, joint swelling and neck pain.   Neurological: Negative for weakness and headaches.   Psychiatric/Behavioral: Positive for sleep disturbance. Negative for confusion and dysphoric mood. The patient is nervous/anxious.      Past Medical History:   Diagnosis Date    Anxiety     Hypercholesteremia     Obesity           Past Surgical History:   Procedure Laterality Date    ANTERIOR CRUCIATE LIGAMENT REPAIR       Family History   Problem Relation Age of Onset    No Known Problems Mother     No Known Problems Father      Social History     Socioeconomic History    Marital status: Single     Spouse name: Not on file    Number of children: Not on file    Years of education: Not on file    Highest education level: Not on file   Occupational History    Not on file   Social Needs    Financial resource strain: Not on file    Food insecurity     Worry: Not on file     Inability: Not on file    Transportation needs     Medical: Not on file     Non-medical: Not on file   Tobacco Use    Smoking status: Never Smoker    Smokeless tobacco: Never Used   Substance and Sexual Activity    Alcohol use: Yes     Alcohol/week: 0.0 standard drinks    Drug use: No    Sexual activity: Yes     Partners: Female   Lifestyle    Physical activity     Days per week: Not on file     Minutes per session: Not on file    Stress: To some extent   Relationships    Social connections     Talks on phone: Not on file     Gets together: Not on file     Attends Jain service: Not on file     Active member of club or organization: Not on file     Attends meetings of clubs or organizations: Not on file     Relationship status: Not on file   Other Topics Concern    Not on file   Social History Narrative    Not on file     Review of  patient's allergies indicates:  No Known Allergies    Objective:       There were no vitals taken for this visit.  Physical Exam  Constitutional:       General: He is not in acute distress.     Appearance: Normal appearance. He is well-developed. He is not ill-appearing or diaphoretic.   Pulmonary:      Effort: No respiratory distress.   Neurological:      General: No focal deficit present.      Mental Status: He is alert and oriented to person, place, and time.   Psychiatric:         Mood and Affect: Mood normal.         Behavior: Behavior normal.         Thought Content: Thought content normal.         Judgment: Judgment normal.       Assessment:     1. Insomnia, unspecified type    2. Anxiety    3. Erectile dysfunction, unspecified erectile dysfunction type      Plan:   Insomnia, unspecified type    Anxiety  -     Hemoglobin A1C; Future; Expected date: 11/30/2020  -     Comprehensive Metabolic Panel; Future; Expected date: 05/29/2021  -     ALPRAZolam (XANAX) 1 MG tablet; Take 1 tablet (1 mg total) by mouth daily as needed for Anxiety.  Dispense: 30 tablet; Refill: 1    Erectile dysfunction, unspecified erectile dysfunction type    Other orders  -     sildenafiL (VIAGRA) 100 MG tablet; Take 1 tablet (100 mg total) by mouth daily as needed for Erectile Dysfunction.  Dispense: 15 tablet; Refill: 6  -     traZODone (DESYREL) 100 MG tablet; Take 1 tablet (100 mg total) by mouth every evening.  Dispense: 30 tablet; Refill: 11    patient will message me in a week or two to update me on trazodone efficacy, sooner if any adverse side effects.  He will come into clinic when fasting for routine labs.   Medication List with Changes/Refills   New Medications    TRAZODONE (DESYREL) 100 MG TABLET    Take 1 tablet (100 mg total) by mouth every evening.   Current Medications    FUROSEMIDE (LASIX) 20 MG TABLET    Take 1 tablet (20 mg total) by mouth 2 (two) times daily as needed (swelling).   Changed and/or Refilled Medications     Modified Medication Previous Medication    ALPRAZOLAM (XANAX) 1 MG TABLET ALPRAZolam (XANAX) 1 MG tablet       Take 1 tablet (1 mg total) by mouth daily as needed for Anxiety.    Take 1 tablet (1 mg total) by mouth daily as needed for Anxiety.    SILDENAFIL (VIAGRA) 100 MG TABLET sildenafiL (VIAGRA) 100 MG tablet       Take 1 tablet (100 mg total) by mouth daily as needed for Erectile Dysfunction.    Take 1 tablet (100 mg total) by mouth daily as needed for Erectile Dysfunction.

## 2021-04-08 RX ORDER — ESZOPICLONE 2 MG/1
2 TABLET, FILM COATED ORAL NIGHTLY
Qty: 30 TABLET | Refills: 3 | Status: SHIPPED | OUTPATIENT
Start: 2021-04-08 | End: 2021-08-11 | Stop reason: SDUPTHER

## 2021-04-27 ENCOUNTER — LAB VISIT (OUTPATIENT)
Dept: LAB | Facility: HOSPITAL | Age: 46
End: 2021-04-27
Attending: FAMILY MEDICINE
Payer: COMMERCIAL

## 2021-04-27 DIAGNOSIS — F41.9 ANXIETY: ICD-10-CM

## 2021-04-27 DIAGNOSIS — N52.9 ERECTILE DYSFUNCTION, UNSPECIFIED ERECTILE DYSFUNCTION TYPE: ICD-10-CM

## 2021-04-27 LAB
ALBUMIN SERPL BCP-MCNC: 4.2 G/DL (ref 3.5–5.2)
ALP SERPL-CCNC: 72 U/L (ref 55–135)
ALT SERPL W/O P-5'-P-CCNC: 63 U/L (ref 10–44)
ANION GAP SERPL CALC-SCNC: 10 MMOL/L (ref 8–16)
AST SERPL-CCNC: 39 U/L (ref 10–40)
BILIRUB SERPL-MCNC: 0.4 MG/DL (ref 0.1–1)
BUN SERPL-MCNC: 12 MG/DL (ref 6–20)
CALCIUM SERPL-MCNC: 9.8 MG/DL (ref 8.7–10.5)
CHLORIDE SERPL-SCNC: 98 MMOL/L (ref 95–110)
CHOLEST SERPL-MCNC: 282 MG/DL (ref 120–199)
CHOLEST/HDLC SERPL: 7.2 {RATIO} (ref 2–5)
CO2 SERPL-SCNC: 28 MMOL/L (ref 23–29)
CREAT SERPL-MCNC: 0.8 MG/DL (ref 0.5–1.4)
EST. GFR  (AFRICAN AMERICAN): >60 ML/MIN/1.73 M^2
EST. GFR  (NON AFRICAN AMERICAN): >60 ML/MIN/1.73 M^2
GLUCOSE SERPL-MCNC: 98 MG/DL (ref 70–110)
HDLC SERPL-MCNC: 39 MG/DL (ref 40–75)
HDLC SERPL: 13.8 % (ref 20–50)
LDLC SERPL CALC-MCNC: ABNORMAL MG/DL (ref 63–159)
NONHDLC SERPL-MCNC: 243 MG/DL
POTASSIUM SERPL-SCNC: 4.3 MMOL/L (ref 3.5–5.1)
PROT SERPL-MCNC: 7.7 G/DL (ref 6–8.4)
SODIUM SERPL-SCNC: 136 MMOL/L (ref 136–145)
TRIGL SERPL-MCNC: 734 MG/DL (ref 30–150)

## 2021-04-27 PROCEDURE — 80053 COMPREHEN METABOLIC PANEL: CPT | Performed by: FAMILY MEDICINE

## 2021-04-27 PROCEDURE — 80061 LIPID PANEL: CPT | Performed by: FAMILY MEDICINE

## 2021-04-27 PROCEDURE — 83036 HEMOGLOBIN GLYCOSYLATED A1C: CPT | Performed by: FAMILY MEDICINE

## 2021-04-27 PROCEDURE — 36415 COLL VENOUS BLD VENIPUNCTURE: CPT | Mod: PO | Performed by: FAMILY MEDICINE

## 2021-04-27 RX ORDER — ALPRAZOLAM 1 MG/1
1 TABLET ORAL DAILY PRN
Qty: 30 TABLET | Refills: 1 | Status: SHIPPED | OUTPATIENT
Start: 2021-04-27 | End: 2021-08-11 | Stop reason: SDUPTHER

## 2021-04-28 ENCOUNTER — PATIENT MESSAGE (OUTPATIENT)
Dept: RESEARCH | Facility: HOSPITAL | Age: 46
End: 2021-04-28

## 2021-04-28 LAB
ESTIMATED AVG GLUCOSE: 114 MG/DL (ref 68–131)
HBA1C MFR BLD: 5.6 % (ref 4–5.6)

## 2021-08-03 ENCOUNTER — TELEPHONE (OUTPATIENT)
Dept: INTERNAL MEDICINE | Facility: CLINIC | Age: 46
End: 2021-08-03

## 2021-08-09 RX ORDER — ESZOPICLONE 2 MG/1
2 TABLET, FILM COATED ORAL NIGHTLY
Qty: 30 TABLET | Refills: 3 | OUTPATIENT
Start: 2021-08-09

## 2021-08-11 ENCOUNTER — OFFICE VISIT (OUTPATIENT)
Dept: INTERNAL MEDICINE | Facility: CLINIC | Age: 46
End: 2021-08-11
Payer: COMMERCIAL

## 2021-08-11 DIAGNOSIS — F41.9 ANXIETY: ICD-10-CM

## 2021-08-11 DIAGNOSIS — E78.2 ELEVATED TRIGLYCERIDES WITH HIGH CHOLESTEROL: ICD-10-CM

## 2021-08-11 DIAGNOSIS — R74.01 ELEVATED ALT MEASUREMENT: ICD-10-CM

## 2021-08-11 DIAGNOSIS — G47.00 INSOMNIA, UNSPECIFIED TYPE: Primary | ICD-10-CM

## 2021-08-11 PROCEDURE — 99214 PR OFFICE/OUTPT VISIT, EST, LEVL IV, 30-39 MIN: ICD-10-PCS | Mod: 95,,, | Performed by: FAMILY MEDICINE

## 2021-08-11 PROCEDURE — 1159F PR MEDICATION LIST DOCUMENTED IN MEDICAL RECORD: ICD-10-PCS | Mod: CPTII,,, | Performed by: FAMILY MEDICINE

## 2021-08-11 PROCEDURE — 1160F RVW MEDS BY RX/DR IN RCRD: CPT | Mod: CPTII,,, | Performed by: FAMILY MEDICINE

## 2021-08-11 PROCEDURE — 1160F PR REVIEW ALL MEDS BY PRESCRIBER/CLIN PHARMACIST DOCUMENTED: ICD-10-PCS | Mod: CPTII,,, | Performed by: FAMILY MEDICINE

## 2021-08-11 PROCEDURE — 3044F HG A1C LEVEL LT 7.0%: CPT | Mod: CPTII,,, | Performed by: FAMILY MEDICINE

## 2021-08-11 PROCEDURE — 1159F MED LIST DOCD IN RCRD: CPT | Mod: CPTII,,, | Performed by: FAMILY MEDICINE

## 2021-08-11 PROCEDURE — 3044F PR MOST RECENT HEMOGLOBIN A1C LEVEL <7.0%: ICD-10-PCS | Mod: CPTII,,, | Performed by: FAMILY MEDICINE

## 2021-08-11 PROCEDURE — 99214 OFFICE O/P EST MOD 30 MIN: CPT | Mod: 95,,, | Performed by: FAMILY MEDICINE

## 2021-08-11 RX ORDER — FENOFIBRATE 160 MG/1
160 TABLET ORAL DAILY
Qty: 90 TABLET | Refills: 3 | Status: SHIPPED | OUTPATIENT
Start: 2021-08-11 | End: 2022-02-08 | Stop reason: SDUPTHER

## 2021-08-11 RX ORDER — ESZOPICLONE 2 MG/1
2 TABLET, FILM COATED ORAL NIGHTLY
Qty: 30 TABLET | Refills: 5 | Status: SHIPPED | OUTPATIENT
Start: 2021-08-11 | End: 2022-02-08 | Stop reason: SDUPTHER

## 2021-08-11 RX ORDER — ALPRAZOLAM 1 MG/1
1 TABLET ORAL DAILY PRN
Qty: 30 TABLET | Refills: 5 | Status: SHIPPED | OUTPATIENT
Start: 2021-08-11 | End: 2022-02-08 | Stop reason: SDUPTHER

## 2021-08-11 RX ORDER — SILDENAFIL 100 MG/1
100 TABLET, FILM COATED ORAL DAILY PRN
Qty: 15 TABLET | Refills: 6 | Status: SHIPPED | OUTPATIENT
Start: 2021-08-11 | End: 2023-12-01 | Stop reason: SDUPTHER

## 2022-02-07 NOTE — TELEPHONE ENCOUNTER
Care Due:                  Date            Visit Type   Department     Provider  --------------------------------------------------------------------------------                                ESTABLISHED                              PATIENT -    [facility] INTERNAL  Last Visit: 08-      [facility]       Kayla Perez  Next Visit: None Scheduled  None         None Found                                                            Last  Test          Frequency    Reason                     Performed    Due Date  --------------------------------------------------------------------------------    CBC.........  12 months..  fenofibrate..............  Not Found    Overdue    CMP.........  12 months..  fenofibrate..............  Not Found    Overdue    Lipid Panel.  12 months..  fenofibrate..............  Not Found    Overdue    Powered by [employer]. Reference number: [id number].   2/07/2022 4:18:31 PM CST

## 2022-02-08 ENCOUNTER — OFFICE VISIT (OUTPATIENT)
Dept: INTERNAL MEDICINE | Facility: CLINIC | Age: 47
End: 2022-02-08
Payer: COMMERCIAL

## 2022-02-08 ENCOUNTER — LAB VISIT (OUTPATIENT)
Dept: LAB | Facility: HOSPITAL | Age: 47
End: 2022-02-08
Attending: FAMILY MEDICINE
Payer: COMMERCIAL

## 2022-02-08 VITALS
HEART RATE: 86 BPM | OXYGEN SATURATION: 95 % | BODY MASS INDEX: 50.62 KG/M2 | HEIGHT: 66 IN | TEMPERATURE: 98 F | SYSTOLIC BLOOD PRESSURE: 173 MMHG | DIASTOLIC BLOOD PRESSURE: 93 MMHG | WEIGHT: 315 LBS

## 2022-02-08 DIAGNOSIS — F41.9 ANXIETY: ICD-10-CM

## 2022-02-08 DIAGNOSIS — E78.2 ELEVATED TRIGLYCERIDES WITH HIGH CHOLESTEROL: ICD-10-CM

## 2022-02-08 DIAGNOSIS — R74.01 ELEVATED ALT MEASUREMENT: ICD-10-CM

## 2022-02-08 DIAGNOSIS — G47.00 INSOMNIA, UNSPECIFIED TYPE: Primary | ICD-10-CM

## 2022-02-08 DIAGNOSIS — G47.00 INSOMNIA, UNSPECIFIED TYPE: ICD-10-CM

## 2022-02-08 LAB
ALBUMIN SERPL BCP-MCNC: 4 G/DL (ref 3.5–5.2)
ALP SERPL-CCNC: 70 U/L (ref 55–135)
ALT SERPL W/O P-5'-P-CCNC: 67 U/L (ref 10–44)
ANION GAP SERPL CALC-SCNC: 7 MMOL/L (ref 8–16)
AST SERPL-CCNC: 58 U/L (ref 10–40)
BASOPHILS # BLD AUTO: 0.07 K/UL (ref 0–0.2)
BASOPHILS NFR BLD: 0.8 % (ref 0–1.9)
BILIRUB SERPL-MCNC: 0.3 MG/DL (ref 0.1–1)
BUN SERPL-MCNC: 16 MG/DL (ref 6–20)
CALCIUM SERPL-MCNC: 10.1 MG/DL (ref 8.7–10.5)
CHLORIDE SERPL-SCNC: 99 MMOL/L (ref 95–110)
CHOLEST SERPL-MCNC: 305 MG/DL (ref 120–199)
CHOLEST/HDLC SERPL: 7.6 {RATIO} (ref 2–5)
CO2 SERPL-SCNC: 27 MMOL/L (ref 23–29)
CREAT SERPL-MCNC: 0.9 MG/DL (ref 0.5–1.4)
DIFFERENTIAL METHOD: ABNORMAL
EOSINOPHIL # BLD AUTO: 0.1 K/UL (ref 0–0.5)
EOSINOPHIL NFR BLD: 1.6 % (ref 0–8)
ERYTHROCYTE [DISTWIDTH] IN BLOOD BY AUTOMATED COUNT: 12.4 % (ref 11.5–14.5)
EST. GFR  (AFRICAN AMERICAN): >60 ML/MIN/1.73 M^2
EST. GFR  (NON AFRICAN AMERICAN): >60 ML/MIN/1.73 M^2
GLUCOSE SERPL-MCNC: 93 MG/DL (ref 70–110)
HCT VFR BLD AUTO: 49.7 % (ref 40–54)
HDLC SERPL-MCNC: 40 MG/DL (ref 40–75)
HDLC SERPL: 13.1 % (ref 20–50)
HGB BLD-MCNC: 17 G/DL (ref 14–18)
IMM GRANULOCYTES # BLD AUTO: 0.15 K/UL (ref 0–0.04)
IMM GRANULOCYTES NFR BLD AUTO: 1.8 % (ref 0–0.5)
LDLC SERPL CALC-MCNC: ABNORMAL MG/DL (ref 63–159)
LYMPHOCYTES # BLD AUTO: 3.4 K/UL (ref 1–4.8)
LYMPHOCYTES NFR BLD: 40.5 % (ref 18–48)
MCH RBC QN AUTO: 31.5 PG (ref 27–31)
MCHC RBC AUTO-ENTMCNC: 34.2 G/DL (ref 32–36)
MCV RBC AUTO: 92 FL (ref 82–98)
MONOCYTES # BLD AUTO: 0.7 K/UL (ref 0.3–1)
MONOCYTES NFR BLD: 8.3 % (ref 4–15)
NEUTROPHILS # BLD AUTO: 3.9 K/UL (ref 1.8–7.7)
NEUTROPHILS NFR BLD: 47 % (ref 38–73)
NONHDLC SERPL-MCNC: 265 MG/DL
NRBC BLD-RTO: 0 /100 WBC
PLATELET # BLD AUTO: 218 K/UL (ref 150–450)
PMV BLD AUTO: 12.7 FL (ref 9.2–12.9)
POTASSIUM SERPL-SCNC: 4.2 MMOL/L (ref 3.5–5.1)
PROT SERPL-MCNC: 7.7 G/DL (ref 6–8.4)
RBC # BLD AUTO: 5.39 M/UL (ref 4.6–6.2)
SODIUM SERPL-SCNC: 133 MMOL/L (ref 136–145)
TRIGL SERPL-MCNC: 962 MG/DL (ref 30–150)
WBC # BLD AUTO: 8.32 K/UL (ref 3.9–12.7)

## 2022-02-08 PROCEDURE — 85025 COMPLETE CBC W/AUTO DIFF WBC: CPT | Performed by: FAMILY MEDICINE

## 2022-02-08 PROCEDURE — 99214 PR OFFICE/OUTPT VISIT, EST, LEVL IV, 30-39 MIN: ICD-10-PCS | Mod: S$GLB,,, | Performed by: FAMILY MEDICINE

## 2022-02-08 PROCEDURE — 80061 LIPID PANEL: CPT | Performed by: FAMILY MEDICINE

## 2022-02-08 PROCEDURE — 99999 PR PBB SHADOW E&M-EST. PATIENT-LVL IV: CPT | Mod: PBBFAC,,, | Performed by: FAMILY MEDICINE

## 2022-02-08 PROCEDURE — 80053 COMPREHEN METABOLIC PANEL: CPT | Performed by: FAMILY MEDICINE

## 2022-02-08 PROCEDURE — 3008F BODY MASS INDEX DOCD: CPT | Mod: CPTII,S$GLB,, | Performed by: FAMILY MEDICINE

## 2022-02-08 PROCEDURE — 99999 PR PBB SHADOW E&M-EST. PATIENT-LVL IV: ICD-10-PCS | Mod: PBBFAC,,, | Performed by: FAMILY MEDICINE

## 2022-02-08 PROCEDURE — 3080F PR MOST RECENT DIASTOLIC BLOOD PRESSURE >= 90 MM HG: ICD-10-PCS | Mod: CPTII,S$GLB,, | Performed by: FAMILY MEDICINE

## 2022-02-08 PROCEDURE — 36415 COLL VENOUS BLD VENIPUNCTURE: CPT | Mod: PO | Performed by: FAMILY MEDICINE

## 2022-02-08 PROCEDURE — 3080F DIAST BP >= 90 MM HG: CPT | Mod: CPTII,S$GLB,, | Performed by: FAMILY MEDICINE

## 2022-02-08 PROCEDURE — 3077F PR MOST RECENT SYSTOLIC BLOOD PRESSURE >= 140 MM HG: ICD-10-PCS | Mod: CPTII,S$GLB,, | Performed by: FAMILY MEDICINE

## 2022-02-08 PROCEDURE — 3077F SYST BP >= 140 MM HG: CPT | Mod: CPTII,S$GLB,, | Performed by: FAMILY MEDICINE

## 2022-02-08 PROCEDURE — 99214 OFFICE O/P EST MOD 30 MIN: CPT | Mod: S$GLB,,, | Performed by: FAMILY MEDICINE

## 2022-02-08 PROCEDURE — 3008F PR BODY MASS INDEX (BMI) DOCUMENTED: ICD-10-PCS | Mod: CPTII,S$GLB,, | Performed by: FAMILY MEDICINE

## 2022-02-08 RX ORDER — FENOFIBRATE 160 MG/1
160 TABLET ORAL DAILY
Qty: 90 TABLET | Refills: 3 | Status: SHIPPED | OUTPATIENT
Start: 2022-02-08 | End: 2023-03-02 | Stop reason: SDUPTHER

## 2022-02-08 RX ORDER — ESZOPICLONE 2 MG/1
2 TABLET, FILM COATED ORAL NIGHTLY
Qty: 30 TABLET | Refills: 5 | Status: SHIPPED | OUTPATIENT
Start: 2022-02-08 | End: 2022-08-19 | Stop reason: SDUPTHER

## 2022-02-08 RX ORDER — ALPRAZOLAM 1 MG/1
1 TABLET ORAL DAILY PRN
Qty: 30 TABLET | Refills: 5 | Status: SHIPPED | OUTPATIENT
Start: 2022-02-08 | End: 2022-03-10

## 2022-02-08 RX ORDER — ESZOPICLONE 2 MG/1
2 TABLET, FILM COATED ORAL NIGHTLY
Qty: 30 TABLET | Refills: 5 | OUTPATIENT
Start: 2022-02-08

## 2022-02-09 ENCOUNTER — TELEPHONE (OUTPATIENT)
Dept: PULMONOLOGY | Facility: CLINIC | Age: 47
End: 2022-02-09
Payer: COMMERCIAL

## 2022-02-09 DIAGNOSIS — R79.89 ELEVATED LFTS: Primary | ICD-10-CM

## 2022-02-09 NOTE — TELEPHONE ENCOUNTER
----- Message from Phi John sent at 2/9/2022  9:31 AM CST -----  Contact: self  Sergio Parra would like a call back at [phone], in regards to his cpap machine.

## 2022-02-10 ENCOUNTER — TELEPHONE (OUTPATIENT)
Dept: PULMONOLOGY | Facility: CLINIC | Age: 47
End: 2022-02-10
Payer: COMMERCIAL

## 2022-02-10 NOTE — TELEPHONE ENCOUNTER
Telephoned per patient request for call regarding Laurie dream station 2.  He states setting Auto CPAP 6-20 cm now feels ramping too high.    He is requesting orders to change CPAP settings to no higher than CPAP 10 cm. advised last visit 10/18/2019 visit when CPAP ordered. Patient never follow up for CPAP IDL  Advised patient he will need visit for orders since over 2 years since seen by a provider.     Patient states preference for telemed visit.     Compliance Summary  1/11/2022 - 2/9/2022 (30 days)  Days with Device Usage 30 days  Days without Device Usage 0 days  Percent Days with Device Usage 100.0%  Cumulative Usage 9 days 13 hrs. 48 mins. 59 secs.  Maximum Usage (1 Day) 9 hrs. 13 mins. 17 secs.  Average Usage (All Days) 7 hrs. 39 mins. 37 secs.  Average Usage (Days Used) 7 hrs. 39 mins. 37 secs.  Minimum Usage (1 Day) 1 hrs. 44 mins. 55 secs.  Percent of Days with Usage >= 4 Hours 96.7%  Percent of Days with Usage < 4 Hours 3.3%  Date Range  Total Blower Time 9 days 15 hrs. 19 mins. 8 secs.  Average AHI 14.3  Auto-CPAP Summary  Auto-CPAP Mean Pressure 9.5 cmH2O  Auto-CPAP Peak Average Pressure 14.3 cmH2O  Device Pressure <= 90% of Time 13.6 cmH2O  Average Time in Large Leak Per Day 1 hrs. 4 mins. 14 secs.

## 2022-02-11 ENCOUNTER — OFFICE VISIT (OUTPATIENT)
Dept: PULMONOLOGY | Facility: CLINIC | Age: 47
End: 2022-02-11
Payer: COMMERCIAL

## 2022-02-11 ENCOUNTER — TELEPHONE (OUTPATIENT)
Dept: PULMONOLOGY | Facility: CLINIC | Age: 47
End: 2022-02-11
Payer: COMMERCIAL

## 2022-02-11 VITALS — HEIGHT: 66 IN | WEIGHT: 315 LBS | BODY MASS INDEX: 50.62 KG/M2

## 2022-02-11 DIAGNOSIS — E66.01 CLASS 3 SEVERE OBESITY DUE TO EXCESS CALORIES WITH SERIOUS COMORBIDITY AND BODY MASS INDEX (BMI) OF 50.0 TO 59.9 IN ADULT: ICD-10-CM

## 2022-02-11 DIAGNOSIS — G47.33 OSA ON CPAP: Primary | ICD-10-CM

## 2022-02-11 PROCEDURE — 1160F PR REVIEW ALL MEDS BY PRESCRIBER/CLIN PHARMACIST DOCUMENTED: ICD-10-PCS | Mod: CPTII,95,, | Performed by: NURSE PRACTITIONER

## 2022-02-11 PROCEDURE — 1159F PR MEDICATION LIST DOCUMENTED IN MEDICAL RECORD: ICD-10-PCS | Mod: CPTII,95,, | Performed by: NURSE PRACTITIONER

## 2022-02-11 PROCEDURE — 1160F RVW MEDS BY RX/DR IN RCRD: CPT | Mod: CPTII,95,, | Performed by: NURSE PRACTITIONER

## 2022-02-11 PROCEDURE — 1159F MED LIST DOCD IN RCRD: CPT | Mod: CPTII,95,, | Performed by: NURSE PRACTITIONER

## 2022-02-11 PROCEDURE — 3008F BODY MASS INDEX DOCD: CPT | Mod: CPTII,95,, | Performed by: NURSE PRACTITIONER

## 2022-02-11 PROCEDURE — 3008F PR BODY MASS INDEX (BMI) DOCUMENTED: ICD-10-PCS | Mod: CPTII,95,, | Performed by: NURSE PRACTITIONER

## 2022-02-11 PROCEDURE — 99213 OFFICE O/P EST LOW 20 MIN: CPT | Mod: 95,,, | Performed by: NURSE PRACTITIONER

## 2022-02-11 PROCEDURE — 99213 PR OFFICE/OUTPT VISIT, EST, LEVL III, 20-29 MIN: ICD-10-PCS | Mod: 95,,, | Performed by: NURSE PRACTITIONER

## 2022-02-11 NOTE — PROGRESS NOTES
The patient location is: home  The chief complaint leading to consultation is: sleep apnea    Visit type: audiovisual    Face to Face time with patient: [id number] [phone] [id number]  20 minutes of total time spent on the encounter, which includes face to face time and non-face to face time preparing to see the patient (eg, review of tests), Obtaining and/or reviewing separately obtained history, Documenting clinical information in the electronic or other health record, Independently interpreting results (not separately reported) and communicating results to the patient/family/caregiver, or Care coordination (not separately reported).         Each patient to whom he or she provides medical services by telemedicine is:  (1) informed of the relationship between the physician and patient and the respective role of any other health care provider with respect to management of the patient; and (2) notified that he or she may decline to receive medical services by telemedicine and may withdraw from such care at any time.    Subjective:      Patient ID: Sergio Parra is a 46 y.o. male.    Chief Complaint: Sleep Apnea    HPI: Sergio Parra engaged in telemedicine visit follow up for EMILY with CPAP complaince assessment.  Last seen 10/18/2019  He is on Auto CPAP of 6-20 cmH2O pressure   He is compliant with CPAP use. Complaince download today reveals 96.7% of days with greater than 4 hours of device use.   Patient reports benefit from CPAP use.  Patient reports complaint of would like change of pressure to Auto CPAP 6-12 cm. finds pressures going up to 14 cm too high and creating mask leak.  Full face mask is use.   HME: [facility]  Orders CPAP supplies on line.    Home sleep study 10/10/2019 severe obstructive sleep apnea 48.  Overall RDI 71.  Mean O2 sat 91.9%.   10/18/2019 orders auto CPAP 6-20 cm. Full face mask.     Obtained replacement CPAP machine [employer] Dreamstation 2       [place] Sleepiness Scale   EPWORTH SLEEPINESS SCALE  2/11/2022 10/18/2019   Sitting and reading 0 2   Watching TV 0 3   Sitting, inactive in a public place (e.g. a theatre or a meeting) 0 1   As a passenger in a car for an hour without a break 0 2   Lying down to rest in the afternoon when circumstances permit 2 3   Sitting and talking to someone 0 0   Sitting quietly after a lunch without alcohol 0 3   In a car, while stopped for a few minutes in traffic 0 0   Total score 2 14       CPAP compliance  Auto CPAP 6-20 cm   Compliance Summary  1/11/2022 - 2/9/2022 (30 days)  Days with Device Usage 30 days  Days without Device Usage 0 days  Percent Days with Device Usage 100.0%  Cumulative Usage 9 days 13 hrs. 48 mins. 59 secs.  Maximum Usage (1 Day) 9 hrs. 13 mins. 17 secs.  Average Usage (All Days) 7 hrs. 39 mins. 37 secs.  Average Usage (Days Used) 7 hrs. 39 mins. 37 secs.  Minimum Usage (1 Day) 1 hrs. 44 mins. 55 secs.  Percent of Days with Usage >= 4 Hours 96.7%  Percent of Days with Usage < 4 Hours 3.3%  Date Range  Total Blower Time 9 days 15 hrs. 19 mins. 8 secs.  Average AHI 14.3  Auto-CPAP Summary  Auto-CPAP Mean Pressure 9.5 cmH2O  Auto-CPAP Peak Average Pressure 14.3 cmH2O  Device Pressure <= 90% of Time 13.6 cmH2O  Average Time in Large Leak Per Day 1 hrs. 4 mins. 14 secs.    Previous Report Reviewed: lab reports and office notes     Past Medical History: The following portions of the patient's history were reviewed and updated as appropriate:   He  has a past surgical history that includes Anterior cruciate ligament repair.  His family history includes No Known Problems in his father and mother.  He  reports that he has never smoked. He has never used smokeless tobacco. He reports current alcohol use. He reports that he does not use drugs.  He has a current medication list which includes the following prescription(s): alprazolam, eszopiclone, fenofibrate, and sildenafil.  He has No Known Allergies..    The following portions of the patient's history were  "reviewed and updated as appropriate: allergies, current medications, past family history, past medical history, past social history, past surgical history and problem list.    Review of Systems   Constitutional: Negative for fever, chills, weight loss, weight gain, activity change, appetite change, fatigue and night sweats.   HENT: Negative for postnasal drip, rhinorrhea, sinus pressure, voice change and congestion.    Eyes: Negative for redness and itching.   Respiratory: Negative for snoring, cough, sputum production, chest tightness, shortness of breath, wheezing, orthopnea, asthma nighttime symptoms, dyspnea on extertion, use of rescue inhaler and somnolence.    Cardiovascular: Negative.  Negative for chest pain, palpitations and leg swelling.   Genitourinary: Negative for difficulty urinating and hematuria.   Endocrine: Negative for cold intolerance and heat intolerance.    Musculoskeletal: Negative for arthralgias, gait problem, joint swelling and myalgias.   Skin: Negative.    Gastrointestinal: Negative for nausea, vomiting, abdominal pain and acid reflux.   Neurological: Negative for dizziness, weakness, light-headedness and headaches.   Hematological: Negative for adenopathy. No excessive bruising.   All other systems reviewed and are negative.     Objective:   Ht 5' 6" (1.676 m)   Wt (!) 147.4 kg (325 lb) Comment: verbalized by patient  BMI 52.46 kg/m²   Physical Exam  Vitals and nursing note reviewed.   Constitutional:       General: He is active. He is not in acute distress.     Appearance: He is well-developed and well-nourished. He is not ill-appearing or toxic-appearing.   HENT:      Head: Normocephalic and atraumatic.      Right Ear: External ear normal.      Left Ear: External ear normal.      Nose: Nose normal.      Mouth/Throat:      Mouth: Oropharynx is clear and moist.      Pharynx: No oropharyngeal exudate.   Eyes:      Conjunctiva/sclera: Conjunctivae normal.   Cardiovascular:      Rate and " "Rhythm: Normal rate and regular rhythm.      Pulses: Intact distal pulses.      Heart sounds: Normal heart sounds.   Pulmonary:      Effort: Pulmonary effort is normal.      Breath sounds: Normal breath sounds.   Abdominal:      Palpations: Abdomen is soft.   Musculoskeletal:         General: No edema.      Cervical back: Normal range of motion and neck supple.   Skin:     General: Skin is warm and dry.   Neurological:      Mental Status: He is alert and oriented to person, place, and time.   Psychiatric:         Mood and Affect: Mood and affect normal.         Behavior: Behavior normal. Behavior is cooperative.         Thought Content: Thought content normal.         Judgment: Judgment normal.         Personal Diagnostic Review  CPAP download    Assessment:     1. EMILY on CPAP    2. Class 3 severe obesity due to excess calories with serious comorbidity and body mass index (BMI) of 50.0 to 59.9 in adult        Orders Placed This Encounter   Procedures    HME - OTHER     Please change remote Auto 6-12 cm  HME: [facility]     Order Specific Question:   Type of Equipment:     Answer:   CPAP     Order Specific Question:   Height:     Answer:   5'6"     Order Specific Question:   Weight:     Answer:   325 lbs     Order Specific Question:   Does patient have medical equipment at home?     Answer:   CPAP    CPAP/BIPAP SUPPLIES     Benefits and compliant  90 day supply. 4 refills  HME: [facility]    Patient orders supplies online     Order Specific Question:   Length of need (1-99 months):     Answer:   99     Order Specific Question:   Choose ONE mask type and its corresponding cushions and/or pillows:     Answer:    Full Face Mask, 1 per 90 days:  Full Face Cushion, (3 per 90 days)     Order Specific Question:   Choose EITHER Heated or Non-Heated Tubjing     Answer:    Non-Heated Tubing, 1 per 90 days     Order Specific Question:   Number of Days Needed:     Answer:   99     Order Specific Question:   All other " supplies as needed as listed below:     Answer:    Headgear, 1 per 180 days     Order Specific Question:   All other supplies as needed as listed below:     Answer:    Chin Strap, 1 per 180 days     Order Specific Question:   All other supplies as needed as listed below:     Answer:    Disposable Filter, 6 per 90 days     Order Specific Question:   All other supplies as needed as listed below:     Answer:    Humidifier Chamber, 1 per 180 days     Order Specific Question:   All other supplies as needed as listed below:     Answer:    Non-Disposable Filter, 1 per 180 days     Plan:     Problem List Items Addressed This Visit     EMILY on CPAP - Primary     Home sleep study 10/10/2019 severe obstructive sleep apnea 48.  Overall RDI 71.  Mean O2 sat 91.9%.   10/18/2019 orders auto CPAP 6-20 cm. Full face mask.   Obtained replacement CPAP machine [employer] Dreamstation 2  Request lower high pressure setting  He request Auto CPAP 6-12 cm  Orders for remote change APAP 6-12 cm, advised Dulce Maria MARTINEZ   Full face mask  Updated supply order, patient obtains supplies on line            Relevant Orders    HME - OTHER    CPAP/BIPAP SUPPLIES    Class 3 severe obesity due to excess calories with serious comorbidity and body mass index (BMI) of 50.0 to 59.9 in adult     Wt Readings from Last 9 Encounters:   02/08/22 (!) 154.6 kg (340 lb 13.3 oz)   03/09/20 (!) 147.1 kg (324 lb 4.8 oz)   02/11/20 (!) 145.2 kg (320 lb 1.7 oz)   10/18/19 (!) 145.4 kg (320 lb 7 oz)   10/10/19 (!) 144.2 kg (318 lb)   10/10/19 (!) 144.5 kg (318 lb 9 oz)   10/04/19 (!) 145.2 kg (320 lb 1.7 oz)   05/30/19 (!) 137.9 kg (304 lb 0.2 oz)   12/27/18 130.3 kg (287 lb 4.2 oz)   There is no height or weight on file to calculate BMI.                 Follow up for EMILY on cpap as needed. pt ordering supplies on line, he prefers fu on as needed basis.

## 2022-02-11 NOTE — ASSESSMENT & PLAN NOTE
Wt Readings from Last 9 Encounters:   02/08/22 (!) 154.6 kg (340 lb 13.3 oz)   03/09/20 (!) 147.1 kg (324 lb 4.8 oz)   02/11/20 (!) 145.2 kg (320 lb 1.7 oz)   10/18/19 (!) 145.4 kg (320 lb 7 oz)   10/10/19 (!) 144.2 kg (318 lb)   10/10/19 (!) 144.5 kg (318 lb 9 oz)   10/04/19 (!) 145.2 kg (320 lb 1.7 oz)   05/30/19 (!) 137.9 kg (304 lb 0.2 oz)   12/27/18 130.3 kg (287 lb 4.2 oz)   There is no height or weight on file to calculate BMI.

## 2022-02-11 NOTE — ASSESSMENT & PLAN NOTE
Home sleep study 10/10/2019 severe obstructive sleep apnea 48.  Overall RDI 71.  Mean O2 sat 91.9%.   10/18/2019 orders auto CPAP 6-20 cm. Full face mask.   Obtained replacement CPAP machine [employer] Dreamstation 2  Request lower high pressure setting  He request Auto CPAP 6-12 cm  Orders for remote change APAP 6-12 cm, advised Dulce Maria HORVATH.   Full face mask  Updated supply order, patient obtains supplies on line

## 2022-02-12 NOTE — PROGRESS NOTES
"Subjective:      Patient ID: Sergio Parra is a 46 y.o. male.    Chief Complaint: Medication Refill      Patient here for routine follow up. Due for repeat labs - discussed elevated triglyceride levels, he had not started fenofibrate as discussed at last visit, reviewed need for this.   Needing refills on alprazolam and lunesta - no adverse side effects , workign well.    Review of Systems   Constitutional: Negative for activity change and appetite change.   Psychiatric/Behavioral: Positive for sleep disturbance. The patient is nervous/anxious.      Past Medical History:   Diagnosis Date    Anxiety     Hypercholesteremia     Obesity           Past Surgical History:   Procedure Laterality Date    ANTERIOR CRUCIATE LIGAMENT REPAIR       Family History   Problem Relation Age of Onset    No Known Problems Mother     No Known Problems Father      Social History     Socioeconomic History    Marital status: Single   Tobacco Use    Smoking status: Never Smoker    Smokeless tobacco: Never Used   Substance and Sexual Activity    Alcohol use: Yes     Alcohol/week: 0.0 standard drinks    Drug use: No    Sexual activity: Yes     Partners: Female     Review of patient's allergies indicates:  No Known Allergies    Objective:       BP (!) 173/93 (BP Location: Right arm)   Pulse 86   Temp 98.1 °F (36.7 °C) (Tympanic)   Ht 5' 6" (1.676 m)   Wt (!) 154.6 kg (340 lb 13.3 oz)   SpO2 95%   BMI 55.01 kg/m²   Physical Exam  Constitutional:       General: He is not in acute distress.     Appearance: Normal appearance. He is well-developed. He is obese. He is not ill-appearing or diaphoretic.   Cardiovascular:      Rate and Rhythm: Normal rate and regular rhythm.      Heart sounds: Normal heart sounds.   Pulmonary:      Effort: Pulmonary effort is normal.      Breath sounds: Normal breath sounds.   Abdominal:      General: There is no distension.      Palpations: Abdomen is soft.   Neurological:      General: No focal " deficit present.      Mental Status: He is alert and oriented to person, place, and time.   Psychiatric:         Mood and Affect: Mood normal.         Behavior: Behavior normal.         Thought Content: Thought content normal.         Judgment: Judgment normal.       Assessment:     1. Insomnia, unspecified type    2. Anxiety    3. Elevated triglycerides with high cholesterol      Plan:   Insomnia, unspecified type    Anxiety  -     ALPRAZolam (XANAX) 1 MG tablet; Take 1 tablet (1 mg total) by mouth daily as needed for Anxiety.  Dispense: 30 tablet; Refill: 5    Elevated triglycerides with high cholesterol    Other orders  -     eszopiclone (LUNESTA) 2 MG Tab; Take 1 tablet (2 mg total) by mouth every evening.  Dispense: 30 tablet; Refill: 5  -     fenofibrate 160 MG Tab; Take 1 tablet (160 mg total) by mouth once daily.  Dispense: 90 tablet; Refill: 3    will have repeat labs today previously ordered  Discussed importance of treating elevated triglycerides  Medication List with Changes/Refills   Current Medications    SILDENAFIL (VIAGRA) 100 MG TABLET    Take 1 tablet (100 mg total) by mouth daily as needed for Erectile Dysfunction.   Changed and/or Refilled Medications    Modified Medication Previous Medication    ALPRAZOLAM (XANAX) 1 MG TABLET ALPRAZolam (XANAX) 1 MG tablet       Take 1 tablet (1 mg total) by mouth daily as needed for Anxiety.    Take 1 tablet (1 mg total) by mouth daily as needed for Anxiety.    ESZOPICLONE (LUNESTA) 2 MG TAB eszopiclone (LUNESTA) 2 MG Tab       Take 1 tablet (2 mg total) by mouth every evening.    Take 1 tablet (2 mg total) by mouth every evening.    FENOFIBRATE 160 MG TAB fenofibrate 160 MG Tab       Take 1 tablet (160 mg total) by mouth once daily.    Take 1 tablet (160 mg total) by mouth once daily.

## 2022-05-03 DIAGNOSIS — F41.9 ANXIETY: ICD-10-CM

## 2022-05-03 RX ORDER — ALPRAZOLAM 1 MG/1
TABLET ORAL
Qty: 30 TABLET | Refills: 3 | Status: SHIPPED | OUTPATIENT
Start: 2022-05-03 | End: 2022-12-10 | Stop reason: SDUPTHER

## 2022-05-03 NOTE — TELEPHONE ENCOUNTER
No new care gaps identified.  Powered by [email or web address]. Reference number: [id number].   5/03/2022 9:49:57 AM CDT

## 2022-05-04 ENCOUNTER — DOCUMENTATION ONLY (OUTPATIENT)
Dept: PULMONOLOGY | Facility: CLINIC | Age: 47
End: 2022-05-04
Payer: COMMERCIAL

## 2022-05-04 NOTE — PROGRESS NOTES
Remote CPAP download on Auto CPAP 6-12 cm   Compliance Summary  Auto CPAP 6-12 cm   2/3/2022 - 5/3/2022 (90 days)  Days with Device Usage 90 days  Days without Device Usage 0 days  Percent Days with Device Usage 100.0%  Cumulative Usage 29 days 5 hrs. 59 mins. 51 secs.  Maximum Usage (1 Day) 11 hrs. 27 mins. 1 secs.  Average Usage (All Days) 7 hrs. 47 mins. 59 secs.  Average Usage (Days Used) 7 hrs. 47 mins. 59 secs.  Minimum Usage (1 Day) 2 hrs. 15 mins. 22 secs.  Percent of Days with Usage >= 4 Hours 96.7%  Percent of Days with Usage < 4 Hours 3.3%  Date Range  Total Blower Time 29 days 7 hrs. 47 secs.  Average AHI 8.5  Auto-CPAP Summary  Auto-CPAP Mean Pressure 9.4 cmH2O  Auto-CPAP Peak Average Pressure 12.5 cmH2O  Device Pressure <= 90% of Time 12.0 cmH2O  Average Time in Large Leak Per Day 29 mins. 49 secs.

## 2022-08-14 NOTE — TELEPHONE ENCOUNTER
No new care gaps identified.  [facility] Embedded Care Gaps. Reference number: [id number]. 8/14/2022   10:54:29 AM BARBT

## 2022-08-15 RX ORDER — ESZOPICLONE 2 MG/1
TABLET, FILM COATED ORAL
Qty: 30 TABLET | Refills: 0 | OUTPATIENT
Start: 2022-08-15

## 2022-08-16 RX ORDER — ESZOPICLONE 2 MG/1
2 TABLET, FILM COATED ORAL NIGHTLY
Qty: 30 TABLET | Refills: 5 | OUTPATIENT
Start: 2022-08-16

## 2022-08-16 NOTE — TELEPHONE ENCOUNTER
----- Message from Katie Torreyadira sent at 8/16/2022 11:55 AM CDT -----  Contact: Patient @ [phone]  Requesting an RX refill or new RX.  Is this a refill or new RX:   RX name and strength eszopiclone (LUNESTA) 2 MG Tab   Is this a 30 day or 90 day RX:   Pharmacy name and phone # [facility] [facility] [address] [facility]  The doctors have asked that we provide their patients with the following 2 reminders -- prescription refills can take up to 72 hours, and a friendly reminder that in the future you can use your [facility] account to request refills: yes

## 2022-08-16 NOTE — TELEPHONE ENCOUNTER
No new care gaps identified.  [facility] Embedded Care Gaps. Reference number: [id number]. 8/16/2022   12:04:22 PM CDT

## 2022-08-19 ENCOUNTER — OFFICE VISIT (OUTPATIENT)
Dept: INTERNAL MEDICINE | Facility: CLINIC | Age: 47
End: 2022-08-19
Payer: COMMERCIAL

## 2022-08-19 VITALS
BODY MASS INDEX: 50.62 KG/M2 | DIASTOLIC BLOOD PRESSURE: 94 MMHG | WEIGHT: 315 LBS | SYSTOLIC BLOOD PRESSURE: 147 MMHG | OXYGEN SATURATION: 97 % | TEMPERATURE: 98 F | HEIGHT: 66 IN | HEART RATE: 94 BPM

## 2022-08-19 DIAGNOSIS — R79.89 ELEVATED LFTS: ICD-10-CM

## 2022-08-19 DIAGNOSIS — R03.0 ELEVATED BLOOD PRESSURE READING: ICD-10-CM

## 2022-08-19 DIAGNOSIS — G47.00 INSOMNIA, UNSPECIFIED TYPE: Primary | ICD-10-CM

## 2022-08-19 PROCEDURE — 99214 PR OFFICE/OUTPT VISIT, EST, LEVL IV, 30-39 MIN: ICD-10-PCS | Mod: S$GLB,,, | Performed by: FAMILY MEDICINE

## 2022-08-19 PROCEDURE — 3080F DIAST BP >= 90 MM HG: CPT | Mod: CPTII,S$GLB,, | Performed by: FAMILY MEDICINE

## 2022-08-19 PROCEDURE — 3077F SYST BP >= 140 MM HG: CPT | Mod: CPTII,S$GLB,, | Performed by: FAMILY MEDICINE

## 2022-08-19 PROCEDURE — 99999 PR PBB SHADOW E&M-EST. PATIENT-LVL IV: ICD-10-PCS | Mod: PBBFAC,,, | Performed by: FAMILY MEDICINE

## 2022-08-19 PROCEDURE — 3008F BODY MASS INDEX DOCD: CPT | Mod: CPTII,S$GLB,, | Performed by: FAMILY MEDICINE

## 2022-08-19 PROCEDURE — 1159F MED LIST DOCD IN RCRD: CPT | Mod: CPTII,S$GLB,, | Performed by: FAMILY MEDICINE

## 2022-08-19 PROCEDURE — 99214 OFFICE O/P EST MOD 30 MIN: CPT | Mod: S$GLB,,, | Performed by: FAMILY MEDICINE

## 2022-08-19 PROCEDURE — 99999 PR PBB SHADOW E&M-EST. PATIENT-LVL IV: CPT | Mod: PBBFAC,,, | Performed by: FAMILY MEDICINE

## 2022-08-19 PROCEDURE — 1159F PR MEDICATION LIST DOCUMENTED IN MEDICAL RECORD: ICD-10-PCS | Mod: CPTII,S$GLB,, | Performed by: FAMILY MEDICINE

## 2022-08-19 PROCEDURE — 3008F PR BODY MASS INDEX (BMI) DOCUMENTED: ICD-10-PCS | Mod: CPTII,S$GLB,, | Performed by: FAMILY MEDICINE

## 2022-08-19 PROCEDURE — 3080F PR MOST RECENT DIASTOLIC BLOOD PRESSURE >= 90 MM HG: ICD-10-PCS | Mod: CPTII,S$GLB,, | Performed by: FAMILY MEDICINE

## 2022-08-19 PROCEDURE — 3077F PR MOST RECENT SYSTOLIC BLOOD PRESSURE >= 140 MM HG: ICD-10-PCS | Mod: CPTII,S$GLB,, | Performed by: FAMILY MEDICINE

## 2022-08-19 RX ORDER — ESZOPICLONE 2 MG/1
2 TABLET, FILM COATED ORAL NIGHTLY
Qty: 30 TABLET | Refills: 0 | Status: SHIPPED | OUTPATIENT
Start: 2022-08-19 | End: 2022-09-14

## 2022-08-21 NOTE — PROGRESS NOTES
"Subjective:      Patient ID: Sergio Parra is a 47 y.o. male.    Chief Complaint: Medication Refill      Patient here for refill on Lunesta.  Blood pressure elevated again today-on review of chart has been elevated since 2020.   No other new problems today    Review of Systems   Constitutional: Negative for activity change and appetite change.   Psychiatric/Behavioral: Positive for sleep disturbance. Negative for dysphoric mood. The patient is nervous/anxious.      Past Medical History:   Diagnosis Date    Anxiety     Hypercholesteremia     Obesity           Past Surgical History:   Procedure Laterality Date    ANTERIOR CRUCIATE LIGAMENT REPAIR       Family History   Problem Relation Age of Onset    No Known Problems Mother     No Known Problems Father      Social History     Socioeconomic History    Marital status: Single   Tobacco Use    Smoking status: Never Smoker    Smokeless tobacco: Never Used   Substance and Sexual Activity    Alcohol use: Yes     Alcohol/week: 0.0 standard drinks    Drug use: No    Sexual activity: Yes     Partners: Female     Review of patient's allergies indicates:  No Known Allergies    Objective:       BP (!) 147/94 (BP Location: Right arm, Patient Position: Sitting, BP Method: Large (Automatic))   Pulse 94   Temp 98 °F (36.7 °C) (Tympanic)   Ht 5' 6" (1.676 m)   Wt (!) 157.9 kg (348 lb 1.7 oz)   SpO2 97%   BMI 56.19 kg/m²   Physical Exam  Constitutional:       General: He is not in acute distress.     Appearance: Normal appearance. He is well-developed. He is obese. He is not ill-appearing or diaphoretic.   Cardiovascular:      Rate and Rhythm: Normal rate and regular rhythm.      Heart sounds: Normal heart sounds.   Pulmonary:      Effort: Pulmonary effort is normal.      Breath sounds: Normal breath sounds.   Neurological:      General: No focal deficit present.      Mental Status: He is alert and oriented to person, place, and time.   Psychiatric:         Mood " and Affect: Mood normal.         Behavior: Behavior normal.         Thought Content: Thought content normal.         Judgment: Judgment normal.       Assessment:     1. Insomnia, unspecified type    2. Elevated LFTs    3. Elevated blood pressure reading      Plan:   Insomnia, unspecified type  -     eszopiclone (LUNESTA) 2 MG Tab; Take 1 tablet (2 mg total) by mouth every evening.  Dispense: 30 tablet; Refill: 0    Elevated LFTs  -     CBC Auto Differential; Future; Expected date: 08/19/2022  -     Comprehensive Metabolic Panel; Future; Expected date: 08/19/2022  -     Lipid Panel; Future; Expected date: 08/19/2022    Elevated blood pressure reading  -     CBC Auto Differential; Future; Expected date: 08/19/2022  -     Comprehensive Metabolic Panel; Future; Expected date: 08/19/2022  -     Lipid Panel; Future; Expected date: 08/19/2022    Patient refused labs today  Patient refused to start blood pressure medication-discussed likelihood of elevated blood pressure readings leading to damage-he wants to try to work on his diet and exercise.  He refused ultrasound, further workup for elevated LFTs  Medication List with Changes/Refills   Current Medications    ALPRAZOLAM (XANAX) 1 MG TABLET    TAKE 1 TABLET BY MOUTH ONCE DAILY AS NEEDED FOR ANXIETY    FENOFIBRATE 160 MG TAB    Take 1 tablet (160 mg total) by mouth once daily.    SILDENAFIL (VIAGRA) 100 MG TABLET    Take 1 tablet (100 mg total) by mouth daily as needed for Erectile Dysfunction.   Changed and/or Refilled Medications    Modified Medication Previous Medication    ESZOPICLONE (LUNESTA) 2 MG TAB eszopiclone (LUNESTA) 2 MG Tab       Take 1 tablet (2 mg total) by mouth every evening.    Take 1 tablet (2 mg total) by mouth every evening.

## 2022-09-08 NOTE — PATIENT INSTRUCTIONS
Follow up with your primary care doctor, Dr. Nicholas   Please drink plenty of fluids.  Please get plenty of rest.  Please return here or go to the Emergency Department for any concerns or worsening of condition.  If you were prescribed antibiotics, please take them to completion.  If you were given wait & see antibiotics, please wait 5-7 days before taking them, and only take them if your symptoms have worsened or not improved.  If you do begin taking the antibiotics, please take them to completion.  If you were prescribed a narcotic medication, do not drive or operate heavy equipment or machinery while taking these medications.  If you were given a steroid shot in the clinic and have also been given a prescription for a steroid such as Prednisone or a Medrol Dose Pack, please begin taking them tomorrow.  If you do not have Hypertension or any history of palpitations, it is ok to take over the counter Sudafed or Mucinex D or Allegra-D or Claritin-D or Zyrtec-D.  If you do take one of the above, it is ok to combine that with plain over the counter Mucinex or Allegra or Claritin or Zyrtec.  If for example you are taking Zyrtec -D, you can combine that with Mucinex, but not Mucinex-D.  If you are taking Mucinex-D, you can combine that with plain Allegra or Claritin or Zyrtec.   If you do have Hypertension or palpitations, it is safe to take Coricidin HBP for relief of sinus symptoms.  If not allergic, please take over the counter Tylenol (Acetaminophen) and/or Motrin (Ibuprofen) as directed for control of pain and/or fever.  Please follow up with your primary care doctor or specialist as needed.    If you  smoke, please stop smoking.

## 2022-11-21 ENCOUNTER — PATIENT MESSAGE (OUTPATIENT)
Dept: ADMINISTRATIVE | Facility: HOSPITAL | Age: 47
End: 2022-11-21
Payer: COMMERCIAL

## 2022-11-21 DIAGNOSIS — Z12.11 SCREENING FOR COLON CANCER: ICD-10-CM

## 2022-12-05 ENCOUNTER — HOSPITAL ENCOUNTER (OUTPATIENT)
Dept: RADIOLOGY | Facility: CLINIC | Age: 47
Discharge: HOME | End: 2022-12-05
Attending: NURSE PRACTITIONER
Payer: COMMERCIAL

## 2022-12-05 ENCOUNTER — OFFICE VISIT (OUTPATIENT)
Dept: URGENT CARE | Facility: CLINIC | Age: 47
End: 2022-12-05
Payer: COMMERCIAL

## 2022-12-05 VITALS
OXYGEN SATURATION: 96 % | BODY MASS INDEX: 50.62 KG/M2 | TEMPERATURE: 98 F | DIASTOLIC BLOOD PRESSURE: 93 MMHG | HEIGHT: 66 IN | HEART RATE: 95 BPM | RESPIRATION RATE: 18 BRPM | SYSTOLIC BLOOD PRESSURE: 174 MMHG | WEIGHT: 315 LBS

## 2022-12-05 DIAGNOSIS — R10.9 ABDOMINAL PAIN, UNSPECIFIED ABDOMINAL LOCATION: ICD-10-CM

## 2022-12-05 DIAGNOSIS — I10 PRIMARY HYPERTENSION: ICD-10-CM

## 2022-12-05 DIAGNOSIS — R10.11 RIGHT UPPER QUADRANT ABDOMINAL PAIN: Primary | ICD-10-CM

## 2022-12-05 PROCEDURE — 99213 PR OFFICE/OUTPT VISIT, EST, LEVL III, 20-29 MIN: ICD-10-PCS | Mod: S$GLB,,, | Performed by: NURSE PRACTITIONER

## 2022-12-05 PROCEDURE — 1159F MED LIST DOCD IN RCRD: CPT | Mod: CPTII,S$GLB,, | Performed by: NURSE PRACTITIONER

## 2022-12-05 PROCEDURE — 1160F PR REVIEW ALL MEDS BY PRESCRIBER/CLIN PHARMACIST DOCUMENTED: ICD-10-PCS | Mod: CPTII,S$GLB,, | Performed by: NURSE PRACTITIONER

## 2022-12-05 PROCEDURE — 1160F RVW MEDS BY RX/DR IN RCRD: CPT | Mod: CPTII,S$GLB,, | Performed by: NURSE PRACTITIONER

## 2022-12-05 PROCEDURE — 3008F PR BODY MASS INDEX (BMI) DOCUMENTED: ICD-10-PCS | Mod: CPTII,S$GLB,, | Performed by: NURSE PRACTITIONER

## 2022-12-05 PROCEDURE — 1159F PR MEDICATION LIST DOCUMENTED IN MEDICAL RECORD: ICD-10-PCS | Mod: CPTII,S$GLB,, | Performed by: NURSE PRACTITIONER

## 2022-12-05 PROCEDURE — 3080F PR MOST RECENT DIASTOLIC BLOOD PRESSURE >= 90 MM HG: ICD-10-PCS | Mod: CPTII,S$GLB,, | Performed by: NURSE PRACTITIONER

## 2022-12-05 PROCEDURE — 3080F DIAST BP >= 90 MM HG: CPT | Mod: CPTII,S$GLB,, | Performed by: NURSE PRACTITIONER

## 2022-12-05 PROCEDURE — 3077F PR MOST RECENT SYSTOLIC BLOOD PRESSURE >= 140 MM HG: ICD-10-PCS | Mod: CPTII,S$GLB,, | Performed by: NURSE PRACTITIONER

## 2022-12-05 PROCEDURE — 99213 OFFICE O/P EST LOW 20 MIN: CPT | Mod: S$GLB,,, | Performed by: NURSE PRACTITIONER

## 2022-12-05 PROCEDURE — 3077F SYST BP >= 140 MM HG: CPT | Mod: CPTII,S$GLB,, | Performed by: NURSE PRACTITIONER

## 2022-12-05 PROCEDURE — 3008F BODY MASS INDEX DOCD: CPT | Mod: CPTII,S$GLB,, | Performed by: NURSE PRACTITIONER

## 2022-12-05 NOTE — PATIENT INSTRUCTIONS
Elevated Blood Pressure  Your blood pressure was elevated during your visit to the urgent care.  It was not so high that immediate care was needed but it is recommended that you monitor your blood pressure over the next week or two to make sure that it is not staying elevated.  Please have your blood pressure taken 2-3 times daily at different times of the day.  Write all of those blood pressures down and record the time that they were taken.  Keep all that information and take it with you to see your Primary Care Physician.  If your blood pressure is consistently above 140/90 you will need to follow up with your PCP more quickly      Abdominal Pain   If your condition worsens or fails to improve we recommend that you receive another evaluation at the ER immediately or contact your PCP to discuss your concerns or return here. You must understand that you've received an urgent care treatment only and that you may be released before all your medical problems are known or treated. You the patient will arrange for followup care as instructed.     Diarrhea  If you have diarrhea you can use Pepto Bismol.  Avoid Imodium unless you have more than 6 episodes of diarrhea in 24 hours.   Avoid any greasy, spicy, fatty or acidic foods at this time.    Increase fluids and rest is important.  Start a clear liquid diet and progress to BRAT diet (see attached handout).       Nausea & Vomiting  If your condition worsens or fails to improve we recommend that you receive another evaluation at the ER immediately or contact your PCP to discuss your concerns or return here. You must understand that you've received an urgent care treatment only and that you may be released before all your medical problems are known or treated. You the patient will arrange for followup care as instructed.   Use prescribed anti-nausea medicine as needed and prescribed. OTC anti-nausea Imitrol as needed.   Increase fluids and rest is important   Start off  with liquid diet and progress as tolerated (see below)  Water and clear liquids are important so you do not get dehydrated. Drink a small amount at a time.  Do not force yourself to eat, especially if you have cramps, vomiting, or diarrhea. When you finally decide to start eating, do not eat large amounts at a time, even if you are hungry.  If you eat, avoid fatty, greasy, spicy, or fried foods.  Do not eat dairy products if you have diarrhea; they can make the diarrhea worse      Watch for any increase pain, fever, localized pain to right lower abdomen or continued vomiting or diarrhea.     Acid Reflux  If you were not prescribed any reflux medications, it is OK to take over the counter Pepcid or Zantac or Prilosec as directed.   Please drink plenty of fluids.   Please get plenty of rest.   Sit up at least 30 minutes after eating a meal; avoid eating late evening meals 2 hours before bedtime.  Avoid food that can make your symptoms worse such as chocolate, peppermint and fatty foods.  Elevate your head of bed by about 6 to 8 inches while sleeping (examples:  Putting blocks of wood or rubber under 2 legs of the bed or Styrofoam wedge under the mattress).    If you smoke, stop smoking.       Please follow up with your Primary care provider within 2-5 days if your signs and symptoms have not resolved or worsen.     If your condition worsens or fails to improve we recommend that you receive another evaluation at the emergency room immediately or contact your primary medical clinic to discuss your concerns.   You must understand that you have received an Urgent Care treatment only and that you may be released before all of your medical problems are known or treated. You, the patient, will arrange for follow up care as instructed.     RED FLAGS/WARNING SYMPTOMS DISCUSSED WITH PATIENT THAT WOULD WARRANT EMERGENT MEDICAL ATTENTION. PATIENT VERBALIZED UNDERSTANDING.

## 2022-12-05 NOTE — PROGRESS NOTES
"Subjective:       Patient ID: Sergio Parra is a 47 y.o. male.    Vitals:  height is 5' 6" (1.676 m) and weight is 157.9 kg (348 lb 1.7 oz) (abnormal). His temperature is 97.6 °F (36.4 °C). His blood pressure is 174/93 (abnormal) and his pulse is 95. His respiration is 18 and oxygen saturation is 96%.     Chief Complaint: Abdominal Cramping    Sergio Parra is a 47 year old male whom presents today with complaints intermittent upper right quadrant abdominal pain. The patient reports the pain has been progressively worsening over the last three days.The patient describes the pain as a sharp pain with a 8/ 10 on the pain scale. Pt has not taken any medication for the pain. The patient denies any trauma to the abdomen.  Patient reports factors that exacerbate the pain includes coughing, moving or deep palpation. The patient reports alleviating factors include non movement. He reports as long as he is sitting still he does not feel the pain. He denies any correlation of the pain to meal consumption. He denies any chest pain, dizziness or shortness of breathe.  Pt has had normal bowel movements.  He denies any excessive gas, urinary symptoms or similar problems in the pass. He reports having a mass/knot to the right upper back area which is chronic. He is concerned the abdominal pain is somehow correlated to this mass/knot on his back.     Abdominal Cramping  This is a new problem. The current episode started in the past 7 days. The onset quality is sudden. The problem has been unchanged. The pain is located in the LUQ. The pain is at a severity of 8/10. The pain is severe. The quality of the pain is sharp. The abdominal pain does not radiate. Pertinent negatives include no anorexia, arthralgias, belching, constipation, diarrhea, dysuria, fever, flatus, frequency, headaches, hematochezia, hematuria, melena, myalgias, nausea, vomiting or weight loss. The pain is aggravated by coughing, movement and palpation. " The pain is relieved by Being still. He has tried nothing for the symptoms. The treatment provided no relief. There is no history of abdominal surgery, gallstones, GERD, irritable bowel syndrome, pancreatitis or PUD. Patient's medical history does not include kidney stones and UTI.     Constitution: Negative for fever.   Gastrointestinal:  Negative for history of abdominal surgery, nausea, vomiting, constipation, diarrhea and bright red blood in stool.   Genitourinary:  Negative for dysuria, frequency and hematuria.   Musculoskeletal:  Negative for joint pain and muscle ache.   Neurological:  Negative for headaches.     Objective:      Physical Exam   Constitutional: He is oriented to person, place, and time. obesity  Cardiovascular: Normal rate and regular rhythm.   Pulmonary/Chest: Effort normal and breath sounds normal.   Abdominal: Normal appearance and bowel sounds are normal. He exhibits no distension and no mass. There is abdominal tenderness. There is no rebound, no guarding, no left CVA tenderness and no right CVA tenderness. No hernia.      Comments: Pain with deep palpation to the right upper abdominal quadrant. No hernia visualized. Negative for abdominal bruit, pulsatile mass.Patient reports pain with lying flat for examination.    Neurological: no focal deficit. He is alert and oriented to person, place, and time.       Assessment:       1. Right upper quadrant abdominal pain    2. Abdominal pain, unspecified abdominal location    3. Primary hypertension          Plan:      Discussed Lifestyle changes to help with decreasing blood pressure such as fresh fruit and veggies, DASH diet, and exercise. Patient reports he is aware of his high blood pressure and working on it. He denies any chest pain, shortness of breathe, lower ext edema or blurred vision.  Differential diagnosis considered including biliary colic, hernia,cholecystitis, cholangitis, hepatitis and renal colic. KUB ordered but patient would  like an ultrasound instead. Patient educated on the importance of imaging and the role it plays in properly diagnosing and treating him.The patient reports he would like to get an ultrasound instead. He reports he thinks this test would provide more answers as it pertains to his pain. Per review of his chart Sergio has a ultrasound which was ordered by Dr. Perez and is still active due to elevated liver enzymes. The KUB was strongly encouraged but the patient reports he would like to complete the ultrasound as ordered per his PCP instead of completing the KUB. Patient remained stable and was discharged in no acute distress.        Right upper quadrant abdominal pain    Abdominal pain, unspecified abdominal location  -     Cancel: X-Ray Abdomen Flat And Erect; Future; Expected date: 12/05/2022  -     Cancel: XR KUB; Future; Expected date: 12/05/2022    Primary hypertension               Patient Instructions   Elevated Blood Pressure  Your blood pressure was elevated during your visit to the urgent care.  It was not so high that immediate care was needed but it is recommended that you monitor your blood pressure over the next week or two to make sure that it is not staying elevated.  Please have your blood pressure taken 2-3 times daily at different times of the day.  Write all of those blood pressures down and record the time that they were taken.  Keep all that information and take it with you to see your Primary Care Physician.  If your blood pressure is consistently above 140/90 you will need to follow up with your PCP more quickly      Abdominal Pain   If your condition worsens or fails to improve we recommend that you receive another evaluation at the ER immediately or contact your PCP to discuss your concerns or return here. You must understand that you've received an urgent care treatment only and that you may be released before all your medical problems are known or treated. You the patient will arrange for  followup care as instructed.     Diarrhea  If you have diarrhea you can use Pepto Bismol.  Avoid Imodium unless you have more than 6 episodes of diarrhea in 24 hours.   Avoid any greasy, spicy, fatty or acidic foods at this time.    Increase fluids and rest is important.  Start a clear liquid diet and progress to BRAT diet (see attached handout).       Nausea & Vomiting  If your condition worsens or fails to improve we recommend that you receive another evaluation at the ER immediately or contact your PCP to discuss your concerns or return here. You must understand that you've received an urgent care treatment only and that you may be released before all your medical problems are known or treated. You the patient will arrange for followup care as instructed.   Use prescribed anti-nausea medicine as needed and prescribed. OTC anti-nausea Imitrol as needed.   Increase fluids and rest is important   Start off with liquid diet and progress as tolerated (see below)  Water and clear liquids are important so you do not get dehydrated. Drink a small amount at a time.  Do not force yourself to eat, especially if you have cramps, vomiting, or diarrhea. When you finally decide to start eating, do not eat large amounts at a time, even if you are hungry.  If you eat, avoid fatty, greasy, spicy, or fried foods.  Do not eat dairy products if you have diarrhea; they can make the diarrhea worse      Watch for any increase pain, fever, localized pain to right lower abdomen or continued vomiting or diarrhea.     Acid Reflux  If you were not prescribed any reflux medications, it is OK to take over the counter Pepcid or Zantac or Prilosec as directed.   Please drink plenty of fluids.   Please get plenty of rest.   Sit up at least 30 minutes after eating a meal; avoid eating late evening meals 2 hours before bedtime.  Avoid food that can make your symptoms worse such as chocolate, peppermint and fatty foods.  Elevate your head of bed by  about 6 to 8 inches while sleeping (examples:  Putting blocks of wood or rubber under 2 legs of the bed or Styrofoam wedge under the mattress).    If you smoke, stop smoking.       Please follow up with your Primary care provider within 2-5 days if your signs and symptoms have not resolved or worsen.     If your condition worsens or fails to improve we recommend that you receive another evaluation at the emergency room immediately or contact your primary medical clinic to discuss your concerns.   You must understand that you have received an Urgent Care treatment only and that you may be released before all of your medical problems are known or treated. You, the patient, will arrange for follow up care as instructed.     RED FLAGS/WARNING SYMPTOMS DISCUSSED WITH PATIENT THAT WOULD WARRANT EMERGENT MEDICAL ATTENTION. PATIENT VERBALIZED UNDERSTANDING.

## 2022-12-08 ENCOUNTER — TELEPHONE (OUTPATIENT)
Dept: INTERNAL MEDICINE | Facility: CLINIC | Age: 47
End: 2022-12-08
Payer: COMMERCIAL

## 2022-12-08 NOTE — TELEPHONE ENCOUNTER
----- Message from Carlitos Lainez sent at 12/8/2022  4:16 PM CST -----  Patient stated that he went to the urgent care in Clinic ([facility]), wanted to schedule a ultrasound, advised him that I would need to send msg to pcp, said he doesn't know why he needs to speak to nurse about his chest. Please call him [phone]

## 2022-12-08 NOTE — TELEPHONE ENCOUNTER
Patient was seen in urgent care at [facility] he stating they want him to have an order for an ultra sound of his abdomen due to he is in severe pain there is only an order for his liver , he is requesting one of his abdomen to be done at the same time   Please advise

## 2022-12-09 ENCOUNTER — TELEPHONE (OUTPATIENT)
Dept: SURGERY | Facility: CLINIC | Age: 47
End: 2022-12-09
Payer: COMMERCIAL

## 2022-12-09 ENCOUNTER — TELEPHONE (OUTPATIENT)
Dept: URGENT CARE | Facility: CLINIC | Age: 47
End: 2022-12-09
Payer: COMMERCIAL

## 2022-12-09 ENCOUNTER — APPOINTMENT (OUTPATIENT)
Dept: RADIOLOGY | Facility: HOSPITAL | Age: 47
End: 2022-12-09
Attending: FAMILY MEDICINE
Payer: COMMERCIAL

## 2022-12-09 DIAGNOSIS — R79.89 ELEVATED LFTS: ICD-10-CM

## 2022-12-09 PROCEDURE — 76705 ECHO EXAM OF ABDOMEN: CPT | Mod: 26,,, | Performed by: RADIOLOGY

## 2022-12-09 PROCEDURE — 76705 ECHO EXAM OF ABDOMEN: CPT | Mod: TC,PO

## 2022-12-09 PROCEDURE — 76705 US ABDOMEN LIMITED_LIVER: ICD-10-PCS | Mod: 26,,, | Performed by: RADIOLOGY

## 2022-12-09 NOTE — TELEPHONE ENCOUNTER
----- Message from Keerthi Rae sent at 12/9/2022 11:27 AM CST -----  Contact: [phone]  Type:  Sooner Apoointment Request    Caller is requesting a sooner appointment.  Caller declined first available appointment listed below.  Caller will not accept being placed on the waitlist and is requesting a message be sent to doctor.  Name of Caller:Sergio   When is the first available appointment?12/15   Symptoms:f/u consult lipoma back concerns   Would the patient rather a call back or a response via [facility]? Call back   Best Call Back Number:[phone]  Additional Information:

## 2022-12-09 NOTE — TELEPHONE ENCOUNTER
Patient called to request to get an abdominal ultrasound ordered. Patient has a pending ultrasound order on file. I advised he can go to [place] location or the [place] and get ultrasound sound done at those locations. Patient verbal understanding.

## 2022-12-09 NOTE — TELEPHONE ENCOUNTER
Returned call to patient, he is aware of his appointment date and time with Dr. Francis to discuss possible surgery for lipoma excision, the patient voiced understanding.

## 2022-12-09 NOTE — TELEPHONE ENCOUNTER
Patient stated he called Urgent Care and they was going to try and get him scheduled at the [place] location for an Ultrasound.

## 2022-12-09 NOTE — TELEPHONE ENCOUNTER
----- Message from Keerthi Rae sent at 12/9/2022 11:20 AM CST -----  Contact: [phone]  Patient would like to consult with a nurse in regards to orders for an ultrasound. The patient is requesting more orders he is aware of order from 2/2022 he stated he is needing more test ordered. Please call back at [phone]. Thanks artemio

## 2022-12-19 ENCOUNTER — HOSPITAL ENCOUNTER (OUTPATIENT)
Dept: RADIOLOGY | Facility: HOSPITAL | Age: 47
Discharge: HOME | End: 2022-12-19
Attending: SURGERY
Payer: COMMERCIAL

## 2022-12-19 ENCOUNTER — OFFICE VISIT (OUTPATIENT)
Dept: SURGERY | Facility: CLINIC | Age: 47
End: 2022-12-19
Payer: COMMERCIAL

## 2022-12-19 VITALS
HEART RATE: 113 BPM | TEMPERATURE: 98 F | DIASTOLIC BLOOD PRESSURE: 99 MMHG | WEIGHT: 315 LBS | BODY MASS INDEX: 55.76 KG/M2 | SYSTOLIC BLOOD PRESSURE: 172 MMHG

## 2022-12-19 DIAGNOSIS — R22.2 MASS ON BACK: Primary | ICD-10-CM

## 2022-12-19 DIAGNOSIS — E66.01 CLASS 3 SEVERE OBESITY DUE TO EXCESS CALORIES WITH SERIOUS COMORBIDITY AND BODY MASS INDEX (BMI) OF 50.0 TO 59.9 IN ADULT: ICD-10-CM

## 2022-12-19 DIAGNOSIS — R22.2 MASS ON BACK: ICD-10-CM

## 2022-12-19 PROCEDURE — 76705 ECHO EXAM OF ABDOMEN: CPT | Mod: TC

## 2022-12-19 PROCEDURE — 99999 PR PBB SHADOW E&M-EST. PATIENT-LVL IV: CPT | Mod: PBBFAC,,, | Performed by: SURGERY

## 2022-12-19 PROCEDURE — 3080F DIAST BP >= 90 MM HG: CPT | Mod: CPTII,S$GLB,, | Performed by: SURGERY

## 2022-12-19 PROCEDURE — 3008F BODY MASS INDEX DOCD: CPT | Mod: CPTII,S$GLB,, | Performed by: SURGERY

## 2022-12-19 PROCEDURE — 99999 PR PBB SHADOW E&M-EST. PATIENT-LVL IV: ICD-10-PCS | Mod: PBBFAC,,, | Performed by: SURGERY

## 2022-12-19 PROCEDURE — 99203 PR OFFICE/OUTPT VISIT, NEW, LEVL III, 30-44 MIN: ICD-10-PCS | Mod: S$GLB,,, | Performed by: SURGERY

## 2022-12-19 PROCEDURE — 3077F SYST BP >= 140 MM HG: CPT | Mod: CPTII,S$GLB,, | Performed by: SURGERY

## 2022-12-19 PROCEDURE — 3008F PR BODY MASS INDEX (BMI) DOCUMENTED: ICD-10-PCS | Mod: CPTII,S$GLB,, | Performed by: SURGERY

## 2022-12-19 PROCEDURE — 1160F RVW MEDS BY RX/DR IN RCRD: CPT | Mod: CPTII,S$GLB,, | Performed by: SURGERY

## 2022-12-19 PROCEDURE — 3080F PR MOST RECENT DIASTOLIC BLOOD PRESSURE >= 90 MM HG: ICD-10-PCS | Mod: CPTII,S$GLB,, | Performed by: SURGERY

## 2022-12-19 PROCEDURE — 1159F PR MEDICATION LIST DOCUMENTED IN MEDICAL RECORD: ICD-10-PCS | Mod: CPTII,S$GLB,, | Performed by: SURGERY

## 2022-12-19 PROCEDURE — 3077F PR MOST RECENT SYSTOLIC BLOOD PRESSURE >= 140 MM HG: ICD-10-PCS | Mod: CPTII,S$GLB,, | Performed by: SURGERY

## 2022-12-19 PROCEDURE — 1159F MED LIST DOCD IN RCRD: CPT | Mod: CPTII,S$GLB,, | Performed by: SURGERY

## 2022-12-19 PROCEDURE — 99203 OFFICE O/P NEW LOW 30 MIN: CPT | Mod: S$GLB,,, | Performed by: SURGERY

## 2022-12-19 PROCEDURE — 1160F PR REVIEW ALL MEDS BY PRESCRIBER/CLIN PHARMACIST DOCUMENTED: ICD-10-PCS | Mod: CPTII,S$GLB,, | Performed by: SURGERY

## 2022-12-19 NOTE — PATIENT INSTRUCTIONS
We will get an ultrasound of that area to see if it is truly a lipoma, a well-circumscribed fatty mass.      If it is a lipoma we can discuss surgery over the phone and arrange everything without You needing another appointment.      Prior to any surgery you would need preop laboratories and EKG.      If you have any questions or concerns please call the office    Our office phone numbers are  [phone] and

## 2022-12-19 NOTE — LETTER
December 19, 2022        Kayla Perez MD  [id number] [address] General Surgery  [address] [place] [id number]  Phone: [phone]  Fax: [phone]   Patient: Sergio Parra   MR Number: [id number]   YOB: 1975   Date of Visit: 12/19/2022       Dear Dr. Perez:    Thank you for referring Sergio Parra to me for evaluation. Below are the relevant portions of my assessment and plan of care.    Subcutaneous mass of the right lower back.  Possible lipoma versus just deposition of fat due to obesity.      I will obtain an ultrasound if it is a lipoma we will discuss surgical excision.  The risks include infection, bleeding and seroma.  I also discussed with him that removal of this mass may not result in resolution of the back pain feels in the area after walking.      The patient is morbidly obese and I recommended that he discuss weight loss options with his primary care doctor.      We will contact him with the results of the ultrasound         If you have questions, please do not hesitate to call me. I look forward to following Sergio along with you.    Sincerely,      Jorge Francis MD           CC    No Recipients

## 2022-12-19 NOTE — PROGRESS NOTES
Patient ID: Sergio Parra is a 47 y.o. male.    Lipoma of the right lower back    Chief Complaint: Follow-up (Mass excision )      HPI:  Patient has a mass of the right lower back it has been present since at least 2019 when he was previously evaluated.  At that time a ultrasound was recommended as it was difficult to tell if it was truly a lipoma or just fatty deposition due to his morbid obesity.      The patient states that when he tries to exercise/walk about [facility] through his walk he gets pain in that area.      He presents now for a surgical evaluation.       several months ago the patient had an episode of abdominal pain.  He has not had any additional episodes of abdominal pain.  An ultrasound done at that time showed gallbladder sludge.  He is monitoring this situation    Review of Systems   Constitutional: Negative.    HENT: Negative.     Eyes: Negative.    Respiratory: Negative.     Cardiovascular: Negative.    Gastrointestinal: Negative.    Endocrine: Negative.    Genitourinary: Negative.    Musculoskeletal:  Positive for back pain.   Skin: Negative.         Mass on the right mid to lower back   Allergic/Immunologic: Negative.    Neurological: Negative.    Hematological: Negative.    Psychiatric/Behavioral: Negative.       Current Outpatient Medications   Medication Sig Dispense Refill    ALPRAZolam (XANAX) 1 MG tablet Take 1 tablet (1 mg total) by mouth 2 (two) times daily as needed for Anxiety. 30 tablet 0    eszopiclone (LUNESTA) 2 MG Tab Take 1 tablet by mouth in the evening 30 tablet 3    fenofibrate 160 MG Tab Take 1 tablet (160 mg total) by mouth once daily. 90 tablet 3    sildenafiL (VIAGRA) 100 MG tablet Take 1 tablet (100 mg total) by mouth daily as needed for Erectile Dysfunction. 15 tablet 6     No current facility-administered medications for this visit.       Review of patient's allergies indicates:  No Known Allergies    Past Medical History:   Diagnosis Date    Anxiety      Hypercholesteremia     Obesity        Past Surgical History:   Procedure Laterality Date    ANTERIOR CRUCIATE LIGAMENT REPAIR         Family History   Problem Relation Age of Onset    No Known Problems Mother     No Known Problems Father        Social History     Socioeconomic History    Marital status: Single   Tobacco Use    Smoking status: Never    Smokeless tobacco: Never   Substance and Sexual Activity    Alcohol use: Yes     Alcohol/week: 0.0 standard drinks    Drug use: No    Sexual activity: Yes     Partners: Female       Vitals:    12/19/22 1330   BP: (!) 172/99   Pulse: (!) 113   Temp: 98.4 °F (36.9 °C)       Physical Exam  Vitals reviewed.   Constitutional:       Appearance: He is obese.   Cardiovascular:      Rate and Rhythm: Normal rate and regular rhythm.   Pulmonary:      Effort: Pulmonary effort is normal.      Breath sounds: Normal breath sounds.   Abdominal:      General: Bowel sounds are normal.      Palpations: Abdomen is soft.      Comments: Massively obese   Musculoskeletal:      Right lower leg: No edema.      Left lower leg: No edema.   Skin:     Comments: There is a vague fullness of the right medial lower back.  It is hard to feel distinct margins.   Neurological:      Mental Status: He is alert.     Assessment & Plan:     Subcutaneous mass of the right lower back.  Possible lipoma versus just deposition of fat due to obesity.      I will obtain an ultrasound if it is a lipoma we will discuss surgical excision.  The risks include infection, bleeding and seroma.  I also discussed with him that removal of this mass may not result in resolution of the back pain feels in the area after walking.      The patient is morbidly obese and I recommended that he discuss weight loss options with his primary care doctor.      We will contact him with the results of the ultrasound

## 2022-12-20 ENCOUNTER — TELEPHONE (OUTPATIENT)
Dept: SURGERY | Facility: HOSPITAL | Age: 47
End: 2022-12-20
Payer: COMMERCIAL

## 2022-12-20 ENCOUNTER — PATIENT MESSAGE (OUTPATIENT)
Dept: SURGERY | Facility: CLINIC | Age: 47
End: 2022-12-20
Payer: COMMERCIAL

## 2022-12-21 ENCOUNTER — TELEPHONE (OUTPATIENT)
Dept: SURGERY | Facility: CLINIC | Age: 47
End: 2022-12-21
Payer: COMMERCIAL

## 2022-12-21 ENCOUNTER — TELEPHONE (OUTPATIENT)
Dept: SURGERY | Facility: HOSPITAL | Age: 47
End: 2022-12-21
Payer: COMMERCIAL

## 2022-12-21 DIAGNOSIS — D17.1 LIPOMA OF BACK: Primary | ICD-10-CM

## 2022-12-21 NOTE — TELEPHONE ENCOUNTER
Called patient to inform him of his procedure date and time,no answer, left message to contact the office.

## 2022-12-21 NOTE — TELEPHONE ENCOUNTER
Called patient.  Informed him that the lipoma is very small.  Explained that it is not likely to resolve the pain that he has when walking with removal.  He however insists that the lipoma is painful and would like to have it removed.  We will be able to do this in the office.  I will have them schedule a office visit between [place] and [facility]'s for excision

## 2022-12-22 ENCOUNTER — PATIENT MESSAGE (OUTPATIENT)
Dept: SURGERY | Facility: CLINIC | Age: 47
End: 2022-12-22
Payer: COMMERCIAL

## 2022-12-29 ENCOUNTER — PROCEDURE VISIT (OUTPATIENT)
Dept: SURGERY | Facility: CLINIC | Age: 47
End: 2022-12-29
Payer: COMMERCIAL

## 2022-12-29 VITALS
TEMPERATURE: 99 F | WEIGHT: 315 LBS | HEART RATE: 106 BPM | DIASTOLIC BLOOD PRESSURE: 111 MMHG | SYSTOLIC BLOOD PRESSURE: 189 MMHG | BODY MASS INDEX: 55.79 KG/M2

## 2022-12-29 DIAGNOSIS — D17.1 LIPOMA OF BACK: Primary | ICD-10-CM

## 2022-12-29 PROCEDURE — 21930 PR EXCISION TUMOR SOFT TISSUE BACK/FLANK SUBQ <3CM: ICD-10-PCS | Mod: S$GLB,,, | Performed by: SURGERY

## 2022-12-29 PROCEDURE — 21930 EXC BACK LES SC < 3 CM: CPT | Mod: S$GLB,,, | Performed by: SURGERY

## 2022-12-29 RX ORDER — HYDROCODONE BITARTRATE AND ACETAMINOPHEN 7.5; 325 MG/1; MG/1
1 TABLET ORAL EVERY 6 HOURS PRN
Qty: 15 TABLET | Refills: 0 | Status: SHIPPED | OUTPATIENT
Start: 2022-12-29 | End: 2023-09-19

## 2022-12-29 NOTE — PROCEDURES
Exc, Tumor Soft Tissue    Date/Time: 12/29/2022 4:00 PM  Performed by: Jorge Francis MD  Authorized by: Jorge Francis MD     Consent Done?:  Yes (Written)  Timeout: prior to procedure the correct patient, procedure, and site was verified    Prep: patient was prepped and draped in usual sterile fashion    Anesthesia:  Local infiltration  Local anesthetic:  Lidocaine 1% with epinephrine  Anesthetic total (ml):  9  Indications:  Lipoma  Body area:  Back / flank  Laterality:  Right  Position:  Prone  Anesthesia:  Local infiltration  Local anesthetic:  Lidocaine 1% with epinephrine  Excision type:  Tumor  Excision size (cm):  2.5  Scalpel size:  15  Incision type:  Single straight  Estimated blood loss (cc):  5  Wound closure:  Intermediate layered  Wound repair size (cm):  2.5  Sutures:  3-0 Vicryl (4-0 Monocryl in the skin)  Sterile dressings:  Other (comments) (Dermabond)  Post-op diagnosis:  Same as pre-op diagnosis.   Needle, instrument, and sponge counts were correct.   Patient tolerated the procedure well with no immediate complications.   Post-operative instructions were provided for the patient.  Comments:      Once the transverse incision was made and the it dermis was opened a small fatty tumor was identified and removed in 2 pieces.  The lipomas total side shows 1.5 x 1 x 1 cm.      Deep layers were closed with 3-0 Vicryl interrupted fashion.  The deep dermis with 3-0 Vicryl in a horizontal mattress fashion.  The skin was closed with Monocryl in a subcuticular manner.  Dermabond was placed.

## 2022-12-29 NOTE — PATIENT INSTRUCTIONS
Is okay to shower and get the area wet.      If the pain medicine causes constipation you can use over-the-counter laxatives.  Please call for any redness drainage or swelling.      I have also included some information about biliary colic.      Gallbladder pain usually occurs after eating especially fatty food      Our office phone numbers are  [phone] and

## 2023-01-03 LAB — HEMOCCULT STL QL IA: NORMAL

## 2023-02-24 ENCOUNTER — LAB VISIT (OUTPATIENT)
Dept: LAB | Facility: HOSPITAL | Age: 48
End: 2023-02-24
Attending: FAMILY MEDICINE
Payer: COMMERCIAL

## 2023-02-24 DIAGNOSIS — R79.89 ELEVATED LFTS: ICD-10-CM

## 2023-02-24 DIAGNOSIS — F41.9 ANXIETY: ICD-10-CM

## 2023-02-24 DIAGNOSIS — G47.00 INSOMNIA, UNSPECIFIED TYPE: ICD-10-CM

## 2023-02-24 DIAGNOSIS — R03.0 ELEVATED BLOOD PRESSURE READING: ICD-10-CM

## 2023-02-24 LAB
ALBUMIN SERPL BCP-MCNC: 4 G/DL (ref 3.5–5.2)
ALP SERPL-CCNC: 56 U/L (ref 55–135)
ALT SERPL W/O P-5'-P-CCNC: 39 U/L (ref 10–44)
ANION GAP SERPL CALC-SCNC: 11 MMOL/L (ref 8–16)
AST SERPL-CCNC: 27 U/L (ref 10–40)
BASOPHILS # BLD AUTO: 0.05 K/UL (ref 0–0.2)
BASOPHILS NFR BLD: 0.8 % (ref 0–1.9)
BILIRUB SERPL-MCNC: 0.4 MG/DL (ref 0.1–1)
BUN SERPL-MCNC: 17 MG/DL (ref 6–20)
CALCIUM SERPL-MCNC: 9.4 MG/DL (ref 8.7–10.5)
CHLORIDE SERPL-SCNC: 103 MMOL/L (ref 95–110)
CHOLEST SERPL-MCNC: 286 MG/DL (ref 120–199)
CHOLEST/HDLC SERPL: 7.5 {RATIO} (ref 2–5)
CO2 SERPL-SCNC: 24 MMOL/L (ref 23–29)
CREAT SERPL-MCNC: 1.1 MG/DL (ref 0.5–1.4)
DIFFERENTIAL METHOD: ABNORMAL
EOSINOPHIL # BLD AUTO: 0.1 K/UL (ref 0–0.5)
EOSINOPHIL NFR BLD: 0.9 % (ref 0–8)
ERYTHROCYTE [DISTWIDTH] IN BLOOD BY AUTOMATED COUNT: 12.6 % (ref 11.5–14.5)
EST. GFR  (NO RACE VARIABLE): >60 ML/MIN/1.73 M^2
GLUCOSE SERPL-MCNC: 109 MG/DL (ref 70–110)
HCT VFR BLD AUTO: 55.1 % (ref 40–54)
HDLC SERPL-MCNC: 38 MG/DL (ref 40–75)
HDLC SERPL: 13.3 % (ref 20–50)
HGB BLD-MCNC: 17.1 G/DL (ref 14–18)
IMM GRANULOCYTES # BLD AUTO: 0.09 K/UL (ref 0–0.04)
IMM GRANULOCYTES NFR BLD AUTO: 1.4 % (ref 0–0.5)
LDLC SERPL CALC-MCNC: 172.6 MG/DL (ref 63–159)
LYMPHOCYTES # BLD AUTO: 3.3 K/UL (ref 1–4.8)
LYMPHOCYTES NFR BLD: 51.4 % (ref 18–48)
MCH RBC QN AUTO: 30 PG (ref 27–31)
MCHC RBC AUTO-ENTMCNC: 31 G/DL (ref 32–36)
MCV RBC AUTO: 97 FL (ref 82–98)
MONOCYTES # BLD AUTO: 0.6 K/UL (ref 0.3–1)
MONOCYTES NFR BLD: 9.5 % (ref 4–15)
NEUTROPHILS # BLD AUTO: 2.3 K/UL (ref 1.8–7.7)
NEUTROPHILS NFR BLD: 36 % (ref 38–73)
NONHDLC SERPL-MCNC: 248 MG/DL
NRBC BLD-RTO: 0 /100 WBC
PLATELET # BLD AUTO: 228 K/UL (ref 150–450)
PMV BLD AUTO: 11.5 FL (ref 9.2–12.9)
POTASSIUM SERPL-SCNC: 4.9 MMOL/L (ref 3.5–5.1)
PROT SERPL-MCNC: 7.2 G/DL (ref 6–8.4)
RBC # BLD AUTO: 5.7 M/UL (ref 4.6–6.2)
SODIUM SERPL-SCNC: 138 MMOL/L (ref 136–145)
TRIGL SERPL-MCNC: 377 MG/DL (ref 30–150)
WBC # BLD AUTO: 6.44 K/UL (ref 3.9–12.7)

## 2023-02-24 PROCEDURE — 36415 COLL VENOUS BLD VENIPUNCTURE: CPT | Mod: PO | Performed by: FAMILY MEDICINE

## 2023-02-24 PROCEDURE — 85025 COMPLETE CBC W/AUTO DIFF WBC: CPT | Performed by: FAMILY MEDICINE

## 2023-02-24 PROCEDURE — 80061 LIPID PANEL: CPT | Performed by: FAMILY MEDICINE

## 2023-02-24 PROCEDURE — 80053 COMPREHEN METABOLIC PANEL: CPT | Performed by: FAMILY MEDICINE

## 2023-02-24 RX ORDER — ALPRAZOLAM 1 MG/1
1 TABLET ORAL 2 TIMES DAILY PRN
Qty: 10 TABLET | Refills: 0 | Status: SHIPPED | OUTPATIENT
Start: 2023-02-24 | End: 2023-03-02 | Stop reason: SDUPTHER

## 2023-02-24 RX ORDER — ESZOPICLONE 2 MG/1
2 TABLET, FILM COATED ORAL NIGHTLY
Qty: 7 TABLET | Refills: 0 | Status: SHIPPED | OUTPATIENT
Start: 2023-02-24 | End: 2023-03-02 | Stop reason: SDUPTHER

## 2023-02-24 NOTE — TELEPHONE ENCOUNTER
No new care gaps identified.  [facility] Embedded Care Gaps. Reference number: [id number]. 2/24/2023   9:12:40 AM CST

## 2023-03-02 ENCOUNTER — OFFICE VISIT (OUTPATIENT)
Dept: INTERNAL MEDICINE | Facility: CLINIC | Age: 48
End: 2023-03-02
Payer: COMMERCIAL

## 2023-03-02 VITALS
DIASTOLIC BLOOD PRESSURE: 98 MMHG | HEART RATE: 93 BPM | HEIGHT: 66 IN | OXYGEN SATURATION: 96 % | TEMPERATURE: 98 F | BODY MASS INDEX: 50.62 KG/M2 | WEIGHT: 315 LBS | SYSTOLIC BLOOD PRESSURE: 138 MMHG

## 2023-03-02 DIAGNOSIS — E78.2 ELEVATED TRIGLYCERIDES WITH HIGH CHOLESTEROL: ICD-10-CM

## 2023-03-02 DIAGNOSIS — F41.9 ANXIETY: ICD-10-CM

## 2023-03-02 DIAGNOSIS — R03.0 ELEVATED BLOOD PRESSURE READING: ICD-10-CM

## 2023-03-02 DIAGNOSIS — G47.00 INSOMNIA, UNSPECIFIED TYPE: Primary | ICD-10-CM

## 2023-03-02 PROCEDURE — 99214 PR OFFICE/OUTPT VISIT, EST, LEVL IV, 30-39 MIN: ICD-10-PCS | Mod: S$GLB,,, | Performed by: FAMILY MEDICINE

## 2023-03-02 PROCEDURE — 3008F PR BODY MASS INDEX (BMI) DOCUMENTED: ICD-10-PCS | Mod: CPTII,S$GLB,, | Performed by: FAMILY MEDICINE

## 2023-03-02 PROCEDURE — 1159F MED LIST DOCD IN RCRD: CPT | Mod: CPTII,S$GLB,, | Performed by: FAMILY MEDICINE

## 2023-03-02 PROCEDURE — 1159F PR MEDICATION LIST DOCUMENTED IN MEDICAL RECORD: ICD-10-PCS | Mod: CPTII,S$GLB,, | Performed by: FAMILY MEDICINE

## 2023-03-02 PROCEDURE — 99214 OFFICE O/P EST MOD 30 MIN: CPT | Mod: S$GLB,,, | Performed by: FAMILY MEDICINE

## 2023-03-02 PROCEDURE — 99999 PR PBB SHADOW E&M-EST. PATIENT-LVL III: CPT | Mod: PBBFAC,,, | Performed by: FAMILY MEDICINE

## 2023-03-02 PROCEDURE — 99999 PR PBB SHADOW E&M-EST. PATIENT-LVL III: ICD-10-PCS | Mod: PBBFAC,,, | Performed by: FAMILY MEDICINE

## 2023-03-02 PROCEDURE — 3080F DIAST BP >= 90 MM HG: CPT | Mod: CPTII,S$GLB,, | Performed by: FAMILY MEDICINE

## 2023-03-02 PROCEDURE — 3075F PR MOST RECENT SYSTOLIC BLOOD PRESS GE 130-139MM HG: ICD-10-PCS | Mod: CPTII,S$GLB,, | Performed by: FAMILY MEDICINE

## 2023-03-02 PROCEDURE — 3080F PR MOST RECENT DIASTOLIC BLOOD PRESSURE >= 90 MM HG: ICD-10-PCS | Mod: CPTII,S$GLB,, | Performed by: FAMILY MEDICINE

## 2023-03-02 PROCEDURE — 3075F SYST BP GE 130 - 139MM HG: CPT | Mod: CPTII,S$GLB,, | Performed by: FAMILY MEDICINE

## 2023-03-02 PROCEDURE — 3008F BODY MASS INDEX DOCD: CPT | Mod: CPTII,S$GLB,, | Performed by: FAMILY MEDICINE

## 2023-03-02 RX ORDER — ESZOPICLONE 2 MG/1
2 TABLET, FILM COATED ORAL NIGHTLY
Qty: 30 TABLET | Refills: 5 | Status: SHIPPED | OUTPATIENT
Start: 2023-03-02 | End: 2023-09-19 | Stop reason: SDUPTHER

## 2023-03-02 RX ORDER — ALPRAZOLAM 1 MG/1
1 TABLET ORAL 2 TIMES DAILY PRN
Qty: 30 TABLET | Refills: 2 | Status: SHIPPED | OUTPATIENT
Start: 2023-03-02 | End: 2023-07-07

## 2023-03-02 RX ORDER — FENOFIBRATE 160 MG/1
160 TABLET ORAL DAILY
Qty: 90 TABLET | Refills: 3 | Status: SHIPPED | OUTPATIENT
Start: 2023-03-02 | End: 2023-09-27

## 2023-03-05 NOTE — PROGRESS NOTES
"Subjective:      Patient ID: Sergio Parra is a 47 y.o. male.    Chief Complaint: Follow-up (6 month follow up )      The patient is here today for routine follow up. Labs drawn earlier discussed today with the patient.  Triglycerides much improved, taking fenofibrate.  Anxiety and insomnia controlled on current meds  Increased stressors currently with daughter in hospital    Follow-up    Review of Systems   Constitutional:  Negative for activity change and appetite change.   Respiratory:  Negative for shortness of breath.    Cardiovascular:  Negative for leg swelling.   Psychiatric/Behavioral:  Positive for sleep disturbance. The patient is nervous/anxious.    Past Medical History:   Diagnosis Date    Anxiety     Hypercholesteremia     Obesity           Past Surgical History:   Procedure Laterality Date    ANTERIOR CRUCIATE LIGAMENT REPAIR       Family History   Problem Relation Age of Onset    No Known Problems Mother     No Known Problems Father      Social History     Socioeconomic History    Marital status: Single   Tobacco Use    Smoking status: Never    Smokeless tobacco: Never   Substance and Sexual Activity    Alcohol use: Yes     Alcohol/week: 0.0 standard drinks    Drug use: No    Sexual activity: Yes     Partners: Female     Review of patient's allergies indicates:  No Known Allergies    Objective:       BP (!) 138/98 (BP Location: Right arm, Patient Position: Sitting, BP Method: Large (Manual))   Pulse 93   Temp 98.4 °F (36.9 °C) (Tympanic)   Ht 5' 6" (1.676 m)   Wt (!) 154.3 kg (340 lb 2.7 oz)   SpO2 96%   BMI 54.90 kg/m²   Physical Exam  Constitutional:       General: He is not in acute distress.     Appearance: Normal appearance. He is well-developed. He is obese. He is not ill-appearing or diaphoretic.   Cardiovascular:      Rate and Rhythm: Normal rate and regular rhythm.      Heart sounds: Normal heart sounds.   Pulmonary:      Effort: Pulmonary effort is normal.      Breath sounds: " Normal breath sounds.   Musculoskeletal:      Right lower leg: No edema.      Left lower leg: No edema.   Neurological:      General: No focal deficit present.      Mental Status: He is alert and oriented to person, place, and time.   Psychiatric:         Mood and Affect: Mood normal.         Behavior: Behavior normal.         Thought Content: Thought content normal.         Judgment: Judgment normal.     Assessment:     1. Insomnia, unspecified type    2. Anxiety    3. Elevated blood pressure reading    4. Elevated triglycerides with high cholesterol      Plan:   Insomnia, unspecified type  -     eszopiclone (LUNESTA) 2 MG Tab; Take 1 tablet (2 mg total) by mouth every evening.  Dispense: 30 tablet; Refill: 5    Anxiety  -     ALPRAZolam (XANAX) 1 MG tablet; Take 1 tablet (1 mg total) by mouth 2 (two) times daily as needed for Anxiety.  Dispense: 30 tablet; Refill: 2  -     Comprehensive Metabolic Panel; Future; Expected date: 09/01/2023  -     Lipid Panel; Future; Expected date: 09/01/2023  -     CBC Auto Differential; Future; Expected date: 09/01/2023    Elevated blood pressure reading    Elevated triglycerides with high cholesterol  -     Comprehensive Metabolic Panel; Future; Expected date: 09/01/2023  -     Lipid Panel; Future; Expected date: 09/01/2023  -     CBC Auto Differential; Future; Expected date: 09/01/2023    Other orders  -     fenofibrate 160 MG Tab; Take 1 tablet (160 mg total) by mouth once daily.  Dispense: 90 tablet; Refill: 3    Continue all other current medications.   2 week bp recheck  Above labs in 6 months prior to visit with me.    Medication List with Changes/Refills   Current Medications    HYDROCODONE-ACETAMINOPHEN (NORCO) 7.5-325 MG PER TABLET    Take 1 tablet by mouth every 6 (six) hours as needed for Pain.    SILDENAFIL (VIAGRA) 100 MG TABLET    Take 1 tablet (100 mg total) by mouth daily as needed for Erectile Dysfunction.   Changed and/or Refilled Medications    Modified  Medication Previous Medication    ALPRAZOLAM (XANAX) 1 MG TABLET ALPRAZolam (XANAX) 1 MG tablet       Take 1 tablet (1 mg total) by mouth 2 (two) times daily as needed for Anxiety.    Take 1 tablet (1 mg total) by mouth 2 (two) times daily as needed for Anxiety.    ESZOPICLONE (LUNESTA) 2 MG TAB eszopiclone (LUNESTA) 2 MG Tab       Take 1 tablet (2 mg total) by mouth every evening.    Take 1 tablet (2 mg total) by mouth every evening.    FENOFIBRATE 160 MG TAB fenofibrate 160 MG Tab       Take 1 tablet (160 mg total) by mouth once daily.    Take 1 tablet (160 mg total) by mouth once daily.

## 2023-03-13 NOTE — TELEPHONE ENCOUNTER
Spoke to pt explained to him that we have no idea what meds are correct for him.  We have not seen him or filled any medications for him since 2019.  He voiced understanding

## 2023-03-17 ENCOUNTER — PATIENT MESSAGE (OUTPATIENT)
Dept: RESEARCH | Facility: HOSPITAL | Age: 48
End: 2023-03-17
Payer: COMMERCIAL

## 2023-06-13 ENCOUNTER — PATIENT MESSAGE (OUTPATIENT)
Dept: ADMINISTRATIVE | Facility: HOSPITAL | Age: 48
End: 2023-06-13
Payer: COMMERCIAL

## 2023-06-13 ENCOUNTER — PATIENT OUTREACH (OUTPATIENT)
Dept: ADMINISTRATIVE | Facility: HOSPITAL | Age: 48
End: 2023-06-13
Payer: COMMERCIAL

## 2023-06-13 NOTE — PROGRESS NOTES
HTN Report: Reached out to patient in regards to blood pressure. No answer. Left voicemail for call back. Sent portal message as well.

## 2023-06-20 ENCOUNTER — PATIENT MESSAGE (OUTPATIENT)
Dept: INTERNAL MEDICINE | Facility: CLINIC | Age: 48
End: 2023-06-20
Payer: COMMERCIAL

## 2023-06-21 ENCOUNTER — TELEPHONE (OUTPATIENT)
Dept: FAMILY MEDICINE | Facility: CLINIC | Age: 48
End: 2023-06-21
Payer: COMMERCIAL

## 2023-06-21 NOTE — TELEPHONE ENCOUNTER
----- Message from Tasha Gregg sent at 6/20/2023  5:11 PM CDT -----    Patient Returning Call        Who Called:pt  Does the patient know what this is regarding?:pt would like nurse to give him a call please  Would the patient rather a call back or a response via [facility]? call  Best Call Back Number:[phone]  Additional Information: call back

## 2023-06-22 ENCOUNTER — PATIENT OUTREACH (OUTPATIENT)
Dept: ADMINISTRATIVE | Facility: HOSPITAL | Age: 48
End: 2023-06-22
Payer: COMMERCIAL

## 2023-06-22 DIAGNOSIS — Z12.11 SCREENING FOR COLON CANCER: Primary | ICD-10-CM

## 2023-06-22 NOTE — PROGRESS NOTES
Working [facility] Fitkit Report.    Kit was unable to be processed.  Fitkit ordered and advised pt would be mailed to his home.

## 2023-07-06 ENCOUNTER — PATIENT MESSAGE (OUTPATIENT)
Dept: ADMINISTRATIVE | Facility: HOSPITAL | Age: 48
End: 2023-07-06
Payer: COMMERCIAL

## 2023-07-06 DIAGNOSIS — F41.9 ANXIETY: ICD-10-CM

## 2023-07-07 RX ORDER — ALPRAZOLAM 1 MG/1
TABLET ORAL
Qty: 30 TABLET | Refills: 0 | Status: SHIPPED | OUTPATIENT
Start: 2023-07-07 | End: 2023-09-19 | Stop reason: SDUPTHER

## 2023-07-07 NOTE — TELEPHONE ENCOUNTER
No care due was identified.  [facility] Embedded Care Due Messages. Reference number: [id number].   7/06/2023 7:28:29 PM CDT

## 2023-07-10 ENCOUNTER — PATIENT MESSAGE (OUTPATIENT)
Dept: INTERNAL MEDICINE | Facility: CLINIC | Age: 48
End: 2023-07-10
Payer: COMMERCIAL

## 2023-07-10 ENCOUNTER — TELEPHONE (OUTPATIENT)
Dept: PSYCHIATRY | Facility: CLINIC | Age: 48
End: 2023-07-10
Payer: COMMERCIAL

## 2023-07-10 DIAGNOSIS — R41.840 LACK OF CONCENTRATION: Primary | ICD-10-CM

## 2023-07-10 NOTE — TELEPHONE ENCOUNTER
----- Message from Ev Mcpherson sent at 7/10/2023 11:13 AM CDT -----  Contact: Sergio De Leon is calling to speak with the department regarding scheduling new patient appointment. Please give a call back at [phone] as requested.  Thanks  LR

## 2023-07-12 ENCOUNTER — PATIENT MESSAGE (OUTPATIENT)
Dept: INTERNAL MEDICINE | Facility: CLINIC | Age: 48
End: 2023-07-12
Payer: COMMERCIAL

## 2023-07-12 PROCEDURE — 82274 ASSAY TEST FOR BLOOD FECAL: CPT | Performed by: FAMILY MEDICINE

## 2023-07-13 ENCOUNTER — TELEPHONE (OUTPATIENT)
Dept: INTERNAL MEDICINE | Facility: CLINIC | Age: 48
End: 2023-07-13
Payer: COMMERCIAL

## 2023-07-13 NOTE — TELEPHONE ENCOUNTER
Spoke with pt, let him know referral has been placed so he can call and get scheduled for psychiatry. Pt verbalized understanding.

## 2023-07-31 ENCOUNTER — TELEPHONE (OUTPATIENT)
Dept: PSYCHIATRY | Facility: CLINIC | Age: 48
End: 2023-07-31
Payer: COMMERCIAL

## 2023-07-31 ENCOUNTER — PATIENT MESSAGE (OUTPATIENT)
Dept: INTERNAL MEDICINE | Facility: CLINIC | Age: 48
End: 2023-07-31
Payer: COMMERCIAL

## 2023-07-31 DIAGNOSIS — R41.840 LACK OF CONCENTRATION: Primary | ICD-10-CM

## 2023-07-31 NOTE — TELEPHONE ENCOUNTER
Assessment and Plan


1. Acute kidney injury:


JENNIFER in the setting of PNA and ?CHF.


Likely ATN.


UA bland.


Renal US negative for hydro.


Monitor renal function.


Creatinine level improving.


Diuretics stopped.


Avoid nephrotoxic agents.


Meds dosage based on GFR.





2. FEN:


Anion-gap metabolic acidosis, monitor.


Monitor lytes and volume status.





3. Pneumonia:


IV antibiotics.


Follow culture result.





4. CHF // Elevated troponin:


Followed by Cards.





5. DM type 2.





6. Hypertension:


BP controlled.











Subjective:


Patient was seen and examined at the bedside.


Doing ok.











Examination:


General appearance: well-developed, appears stated age, not in distress


HEENT: atraumatic, JENA


Neck: trachea midline


Respiratory: ctab


Heart: S1S2, regular, no murmur


Abdomen: soft, bowel sounds heard, NT


Integumentary: no obvious rash


Neurologic: AO, non-focal


Ext: no edema








Subjective


Date of service: 06/13/21


Principal diagnosis: Acute HFrEF/New CMP





Objective





- Vital Signs


Vital signs: 


                               Vital Signs - 12hr











  06/13/21 06/13/21 06/13/21





  00:16 03:55 07:45


 


Temperature 97.7 F 98.4 F 


 


Pulse Rate 71 83 


 


Respiratory 18 18 18





Rate   


 


Blood Pressure 122/61 151/91 


 


O2 Sat by Pulse 97 96 97





Oximetry   














  06/13/21 06/13/21 06/13/21





  08:10 08:30 09:14


 


Temperature 97.9 F  


 


Pulse Rate 77 71 77


 


Respiratory 16  





Rate   


 


Blood Pressure 117/75  117/75


 


O2 Sat by Pulse 97  





Oximetry   














- Lab





                                 06/15/21 04:59





                                 06/15/21 04:59


                             Most recent lab results











Calcium  8.9 mg/dL (8.4-10.2)   06/13/21  05:25    


 


Urine Creatinine  49.1 mg/dL (0.1-20.0)  H  06/11/21  06:00    


 


Urine Sodium  97 mmol/L  06/11/21  06:00    














Medications & Allergies





- Medications


Allergies/Adverse Reactions: 


                                    Allergies





No Known Allergies Allergy (Verified 02/12/14 15:34)


   








Home Medications: 


                                Home Medications











 Medication  Instructions  Recorded  Confirmed  Last Taken  Type


 


lisinopriL [Zestril TAB] 20 mg PO QDAY #30 tablet 02/14/14 06/10/21 06/09/21 Rx


 


Bisoprolol/Hctz (Nf) [Ziac 5/6.25 1 tab PO QDAY 08/30/16 06/10/21 06/09/21 Histo

ry





(Nf)]     


 


Ibuprofen [Motrin 800 MG tab] 800 mg PO TID 08/30/16 06/10/21 11/06/16 History





    400 MG 


 


Multivit-Min/FA/Lycopen/Lutein 1 each PO QDAY 08/30/16 06/10/21 06/08/21 History





[Centrum Silver Tablet]     


 


Nesina 25 mg PO DAILY 09/02/16 06/10/21 11/07/16 History


 


HYDROcodone/APAP 5-325 [[place] 1 - 2 each PO Q4HR PRN #30 tablet 11/08/16 

06/10/21 Unknown Rx





5-325 mg TAB]     


 


Aspirin EC [Ecotrin] 325 mg PO QDAY #30 tablet 07/05/20 06/10/21 06/08/21 Rx


 


NIFEdipine XL [Procardia Xl] 30 mg PO Q12HR #60 tablet 07/05/20 06/10/21 

06/09/21 Rx


 


Pravastatin [Pravachol] 80 mg PO QHS #30 tablet 07/05/20 06/10/21 06/09/21 Rx


 


hydrALAZINE [Apresoline TAB] 75 mg PO Q8HR #90 tablet 07/05/20 06/10/21 06/08/21

Rx











Active Medications: 














Generic Name Dose Route Start Last Admin





  Trade Name Freq  PRN Reason Stop Dose Admin


 


Acetaminophen  650 mg  06/09/21 23:40 





  Acetaminophen 325 Mg Tab  PO  





  Q4H PRN  





  Pain MILD(1-3)/Fever >100.5/HA  


 


Albuterol  2.5 mg  06/09/21 23:40 





  Albuterol 2.5 Mg/3 Ml Nebu  IH  





  Q3HRT PRN  





  Shortness Of Breath  


 


Aspirin  325 mg  06/10/21 10:00  06/13/21 09:15





  Aspirin Ec 325 Mg Tab  PO   325 mg





  QDAY KINJAL   Administration


 


Dextrose  50 ml  06/09/21 23:40 





  Dextrose 50% In Water (25gm) 50 Ml Syringe  IV  





  Q30MIN PRN  





  Hypoglycemia  





  Protocol  


 


Docusate Sodium  100 mg  06/10/21 10:00  06/13/21 09:15





  Docusate Sodium 100 Mg Cap  PO   100 mg





  BID KINJAL   Administration


 


Famotidine  10 mg  06/10/21 10:00  06/13/21 09:14





  Famotidine 10 Mg Tab  PO   10 mg





  BID KINJAL   Administration


 


Furosemide  40 mg  06/13/21 10:00  06/13/21 09:15





  Furosemide 40 Mg Tab  PO   40 mg





  QDAY KINJAL   Administration


 


Guaifenesin  600 mg  06/10/21 01:00  06/13/21 09:15





  Guaifenesin Er 600 Mg Tab  PO   600 mg





  BID KINJAL   Administration


 


Heparin Sodium (Porcine)  5,000 unit  06/10/21 10:00  06/13/21 09:15





  Heparin 5,000 Unit/1 Ml Vial  SUB-Q   5,000 unit





  BID KINJAL   Administration


 


Hydralazine HCl  75 mg  06/10/21 06:00  06/13/21 05:43





  Hydralazine 25 Mg Tab  PO   75 mg





  Q8HR KINJAL   Administration


 


Ceftriaxone Sodium  1 gm in 50 mls @ 100 mls/hr  06/10/21 10:00  06/13/21 09:14





  Rocephin/Ns 1 Gm/50 Ml  IV  06/14/21 10:29  100 mls/hr





  Q24HR KINJAL   Administration





  Protocol  


 


Insulin Human Lispro  0 unit  06/10/21 07:30  06/13/21 12:10





  Insulin Lispro 100 Unit/Ml  SUB-Q   2 unit





  ACHS KINJAL   Administration





  Protocol  


 


Metoprolol Tartrate  25 mg  06/11/21 22:00  06/13/21 09:14





  Metoprolol Tartrate 25 Mg Tab  PO   25 mg





  BID KINJAL   Administration


 


Nifedipine  30 mg  06/10/21 10:00  06/13/21 09:15





  Nifedipine Xl 30 Mg Tab  PO   30 mg





  Q12HR KINJAL   Administration


 


Nitroglycerin  0.4 mg  06/09/21 23:42 





  Nitroglycerin 0.4 Mg Tab Subl  SL  





  .Q5MIN PRN  





  Chest Pain  


 


Ondansetron HCl  4 mg  06/09/21 23:40 





  Ondansetron 4 Mg/2 Ml Inj  IV  





  Q6H PRN  





  Nausea And Vomiting  


 


Pravastatin Sodium  80 mg  06/10/21 22:00  06/12/21 21:50





  Pravastatin 80 Mg Tab  PO   80 mg





  QHS KINJAL   Administration


 


Sodium Chloride  10 ml  06/10/21 10:00  06/13/21 09:15





  Sodium Chloride 0.9% 10 Ml Flush Syringe  IV   10 ml





  BID KINJAL   Administration


 


Sodium Chloride  10 ml  06/09/21 23:40 





  Sodium Chloride 0.9% 10 Ml Flush Syringe  IV  





  PRN PRN  





  LINE FLUSH Rt pt call back in regards to scheduling. Advised pt Dr. Rubio is currently booked through the remainder of the year but someone will reach out to him for scheduling once they get to his referral. Pt asked about another provider. Advised pt if he would like to schedule for therapy or med mgmt, he would need a new referral for scheduling. Pt stated the referral that was put in is the correct referral. Advised pt again, it will be next year before he could get scheduled. Pt stated he understands but no further assistance needed.          ----- Message from Grisel England sent at 7/31/2023 11:41 AM CDT -----  Contact: Sergio  Patient is calling to speak with the nurse regarding appt. Reports have been leaving messages and haven't receive a  call back. Please give patient a call back at .[phone]

## 2023-08-07 ENCOUNTER — TELEPHONE (OUTPATIENT)
Dept: INTERNAL MEDICINE | Facility: CLINIC | Age: 48
End: 2023-08-07
Payer: COMMERCIAL

## 2023-08-16 ENCOUNTER — TELEPHONE (OUTPATIENT)
Dept: NEUROLOGY | Facility: CLINIC | Age: 48
End: 2023-08-16
Payer: COMMERCIAL

## 2023-08-16 NOTE — TELEPHONE ENCOUNTER
Patient came today to see NP MsRickeyJasper before the appointment. We attempted to contact patient to make sure the appointment scheduled for 08/16/2023 was scheduled appropriate. I brought patient to an exam room for privacy to ask more information for today's appointment. Patient advised he was here for ADHD testing and also looking into getting prescribed adderall. I advised patient that we do not do ADHD testing here nor prescribe those types of medications.Patient was upset and angry because of the my chart appt not working efficiently to make sure theres no mix up He stated it was trash and not program well and needed a solution.  I spoke to  and we recommend patient to another clinic that does the testing at the moment. Patient verbalized understanding and stated he would be reaching out to them.

## 2023-08-16 NOTE — TELEPHONE ENCOUNTER
----- Message from Tyler Rhodes sent at 8/15/2023  5:33 PM CDT -----  Regarding: Patient advice  Contact: Pt  Pt returning call from office       Pt can be reached at [phone]

## 2023-08-18 ENCOUNTER — TELEPHONE (OUTPATIENT)
Dept: PSYCHIATRY | Facility: CLINIC | Age: 48
End: 2023-08-18
Payer: COMMERCIAL

## 2023-08-18 NOTE — TELEPHONE ENCOUNTER
----- Message from Ashly Butler sent at 8/17/2023 10:43 AM CDT -----  Pt would like to schedule an appt to have an evaluation. Call back number is [phone]. Thx. EL

## 2023-08-21 ENCOUNTER — PATIENT MESSAGE (OUTPATIENT)
Dept: INTERNAL MEDICINE | Facility: CLINIC | Age: 48
End: 2023-08-21
Payer: COMMERCIAL

## 2023-09-09 DIAGNOSIS — G47.00 INSOMNIA, UNSPECIFIED TYPE: ICD-10-CM

## 2023-09-09 NOTE — TELEPHONE ENCOUNTER
No care due was identified.  [facility] Embedded Care Due Messages. Reference number: [id number].   9/09/2023 11:37:37 AM CDT

## 2023-09-11 DIAGNOSIS — G47.00 INSOMNIA, UNSPECIFIED TYPE: ICD-10-CM

## 2023-09-11 RX ORDER — ESZOPICLONE 2 MG/1
2 TABLET, FILM COATED ORAL NIGHTLY
Qty: 30 TABLET | Refills: 0 | OUTPATIENT
Start: 2023-09-11

## 2023-09-11 RX ORDER — ESZOPICLONE 2 MG/1
2 TABLET, FILM COATED ORAL NIGHTLY
Qty: 30 TABLET | Refills: 5 | OUTPATIENT
Start: 2023-09-11

## 2023-09-11 NOTE — TELEPHONE ENCOUNTER
No care due was identified.  [facility] Embedded Care Due Messages. Reference number: [id number].   9/11/2023 11:20:39 AM CDT

## 2023-09-18 ENCOUNTER — PATIENT MESSAGE (OUTPATIENT)
Dept: INTERNAL MEDICINE | Facility: CLINIC | Age: 48
End: 2023-09-18
Payer: COMMERCIAL

## 2023-09-18 ENCOUNTER — LAB VISIT (OUTPATIENT)
Dept: LAB | Facility: HOSPITAL | Age: 48
End: 2023-09-18
Attending: FAMILY MEDICINE
Payer: COMMERCIAL

## 2023-09-18 DIAGNOSIS — E78.2 ELEVATED TRIGLYCERIDES WITH HIGH CHOLESTEROL: ICD-10-CM

## 2023-09-18 DIAGNOSIS — F41.9 ANXIETY: ICD-10-CM

## 2023-09-18 LAB
ALBUMIN SERPL BCP-MCNC: 4.1 G/DL (ref 3.5–5.2)
ALP SERPL-CCNC: 52 U/L (ref 55–135)
ALT SERPL W/O P-5'-P-CCNC: 48 U/L (ref 10–44)
ANION GAP SERPL CALC-SCNC: 8 MMOL/L (ref 8–16)
AST SERPL-CCNC: 39 U/L (ref 10–40)
BASOPHILS # BLD AUTO: 0.06 K/UL (ref 0–0.2)
BASOPHILS NFR BLD: 0.9 % (ref 0–1.9)
BILIRUB SERPL-MCNC: 0.6 MG/DL (ref 0.1–1)
BUN SERPL-MCNC: 14 MG/DL (ref 6–20)
CALCIUM SERPL-MCNC: 9.6 MG/DL (ref 8.7–10.5)
CHLORIDE SERPL-SCNC: 101 MMOL/L (ref 95–110)
CHOLEST SERPL-MCNC: 273 MG/DL (ref 120–199)
CHOLEST/HDLC SERPL: 6.7 {RATIO} (ref 2–5)
CO2 SERPL-SCNC: 28 MMOL/L (ref 23–29)
CREAT SERPL-MCNC: 1 MG/DL (ref 0.5–1.4)
DIFFERENTIAL METHOD: ABNORMAL
EOSINOPHIL # BLD AUTO: 0.1 K/UL (ref 0–0.5)
EOSINOPHIL NFR BLD: 1 % (ref 0–8)
ERYTHROCYTE [DISTWIDTH] IN BLOOD BY AUTOMATED COUNT: 12.3 % (ref 11.5–14.5)
EST. GFR  (NO RACE VARIABLE): >60 ML/MIN/1.73 M^2
GLUCOSE SERPL-MCNC: 105 MG/DL (ref 70–110)
HCT VFR BLD AUTO: 51.8 % (ref 40–54)
HDLC SERPL-MCNC: 41 MG/DL (ref 40–75)
HDLC SERPL: 15 % (ref 20–50)
HGB BLD-MCNC: 17.4 G/DL (ref 14–18)
IMM GRANULOCYTES # BLD AUTO: 0.13 K/UL (ref 0–0.04)
IMM GRANULOCYTES NFR BLD AUTO: 1.9 % (ref 0–0.5)
LDLC SERPL CALC-MCNC: ABNORMAL MG/DL (ref 63–159)
LYMPHOCYTES # BLD AUTO: 2.5 K/UL (ref 1–4.8)
LYMPHOCYTES NFR BLD: 35.2 % (ref 18–48)
MCH RBC QN AUTO: 30 PG (ref 27–31)
MCHC RBC AUTO-ENTMCNC: 33.6 G/DL (ref 32–36)
MCV RBC AUTO: 89 FL (ref 82–98)
MONOCYTES # BLD AUTO: 0.6 K/UL (ref 0.3–1)
MONOCYTES NFR BLD: 9.2 % (ref 4–15)
NEUTROPHILS # BLD AUTO: 3.6 K/UL (ref 1.8–7.7)
NEUTROPHILS NFR BLD: 51.8 % (ref 38–73)
NONHDLC SERPL-MCNC: 232 MG/DL
NRBC BLD-RTO: 0 /100 WBC
PLATELET # BLD AUTO: 220 K/UL (ref 150–450)
PMV BLD AUTO: 11.6 FL (ref 9.2–12.9)
POTASSIUM SERPL-SCNC: 4.6 MMOL/L (ref 3.5–5.1)
PROT SERPL-MCNC: 7.5 G/DL (ref 6–8.4)
RBC # BLD AUTO: 5.8 M/UL (ref 4.6–6.2)
SODIUM SERPL-SCNC: 137 MMOL/L (ref 136–145)
TRIGL SERPL-MCNC: 553 MG/DL (ref 30–150)
WBC # BLD AUTO: 6.99 K/UL (ref 3.9–12.7)

## 2023-09-18 PROCEDURE — 36415 COLL VENOUS BLD VENIPUNCTURE: CPT | Mod: PO | Performed by: FAMILY MEDICINE

## 2023-09-18 PROCEDURE — 80053 COMPREHEN METABOLIC PANEL: CPT | Performed by: FAMILY MEDICINE

## 2023-09-18 PROCEDURE — 85025 COMPLETE CBC W/AUTO DIFF WBC: CPT | Performed by: FAMILY MEDICINE

## 2023-09-18 PROCEDURE — 80061 LIPID PANEL: CPT | Performed by: FAMILY MEDICINE

## 2023-09-19 ENCOUNTER — OFFICE VISIT (OUTPATIENT)
Dept: INTERNAL MEDICINE | Facility: CLINIC | Age: 48
End: 2023-09-19
Payer: COMMERCIAL

## 2023-09-19 VITALS
WEIGHT: 315 LBS | TEMPERATURE: 98 F | HEART RATE: 96 BPM | BODY MASS INDEX: 50.62 KG/M2 | SYSTOLIC BLOOD PRESSURE: 130 MMHG | RESPIRATION RATE: 20 BRPM | OXYGEN SATURATION: 100 % | HEIGHT: 66 IN | DIASTOLIC BLOOD PRESSURE: 100 MMHG

## 2023-09-19 DIAGNOSIS — G47.00 INSOMNIA, UNSPECIFIED TYPE: Primary | ICD-10-CM

## 2023-09-19 DIAGNOSIS — F41.9 ANXIETY: ICD-10-CM

## 2023-09-19 DIAGNOSIS — E78.2 ELEVATED TRIGLYCERIDES WITH HIGH CHOLESTEROL: ICD-10-CM

## 2023-09-19 DIAGNOSIS — I10 HYPERTENSION, UNSPECIFIED TYPE: ICD-10-CM

## 2023-09-19 PROCEDURE — 3080F PR MOST RECENT DIASTOLIC BLOOD PRESSURE >= 90 MM HG: ICD-10-PCS | Mod: CPTII,S$GLB,, | Performed by: FAMILY MEDICINE

## 2023-09-19 PROCEDURE — 3008F BODY MASS INDEX DOCD: CPT | Mod: CPTII,S$GLB,, | Performed by: FAMILY MEDICINE

## 2023-09-19 PROCEDURE — 3075F PR MOST RECENT SYSTOLIC BLOOD PRESS GE 130-139MM HG: ICD-10-PCS | Mod: CPTII,S$GLB,, | Performed by: FAMILY MEDICINE

## 2023-09-19 PROCEDURE — 99999 PR PBB SHADOW E&M-EST. PATIENT-LVL III: ICD-10-PCS | Mod: PBBFAC,,, | Performed by: FAMILY MEDICINE

## 2023-09-19 PROCEDURE — 3075F SYST BP GE 130 - 139MM HG: CPT | Mod: CPTII,S$GLB,, | Performed by: FAMILY MEDICINE

## 2023-09-19 PROCEDURE — 99999 PR PBB SHADOW E&M-EST. PATIENT-LVL III: CPT | Mod: PBBFAC,,, | Performed by: FAMILY MEDICINE

## 2023-09-19 PROCEDURE — 1159F PR MEDICATION LIST DOCUMENTED IN MEDICAL RECORD: ICD-10-PCS | Mod: CPTII,S$GLB,, | Performed by: FAMILY MEDICINE

## 2023-09-19 PROCEDURE — 1159F MED LIST DOCD IN RCRD: CPT | Mod: CPTII,S$GLB,, | Performed by: FAMILY MEDICINE

## 2023-09-19 PROCEDURE — 99213 PR OFFICE/OUTPT VISIT, EST, LEVL III, 20-29 MIN: ICD-10-PCS | Mod: S$GLB,,, | Performed by: FAMILY MEDICINE

## 2023-09-19 PROCEDURE — 3080F DIAST BP >= 90 MM HG: CPT | Mod: CPTII,S$GLB,, | Performed by: FAMILY MEDICINE

## 2023-09-19 PROCEDURE — 3008F PR BODY MASS INDEX (BMI) DOCUMENTED: ICD-10-PCS | Mod: CPTII,S$GLB,, | Performed by: FAMILY MEDICINE

## 2023-09-19 PROCEDURE — 99213 OFFICE O/P EST LOW 20 MIN: CPT | Mod: S$GLB,,, | Performed by: FAMILY MEDICINE

## 2023-09-19 RX ORDER — ESZOPICLONE 2 MG/1
2 TABLET, FILM COATED ORAL NIGHTLY
Qty: 30 TABLET | Refills: 5 | Status: SHIPPED | OUTPATIENT
Start: 2023-09-19 | End: 2024-04-03

## 2023-09-19 RX ORDER — ALPRAZOLAM 1 MG/1
TABLET ORAL
Qty: 30 TABLET | Refills: 3 | Status: SHIPPED | OUTPATIENT
Start: 2023-09-19

## 2023-09-19 NOTE — TELEPHONE ENCOUNTER
----- Message from Enriqueta Witt sent at 8/15/2018 11:04 AM CDT -----  Contact: Pt  Pt called and stated that the eye drops he was prescribed is not working and he needs something stronger or another medication. He can be reached at [phone].    Thanks,  Tf          Problem: PHYSICAL THERAPY ADULT  Goal: Performs mobility at highest level of function for planned discharge setting. See evaluation for individualized goals. Description: Treatment/Interventions: Functional transfer training, LE strengthening/ROM, Therapeutic exercise, Endurance training, Bed mobility, Gait training          See flowsheet documentation for full assessment, interventions and recommendations. Outcome: Progressing  Note: Prognosis: Good  Problem List:  (Decreased strength; Decreased endurance; Impaired balance; Decreased mobility)  Assessment: Pt. seen for PT treatment session this date with interventions consisting of bed mobility, transfers and  gait training w/ emphasis on improving pt's ability to ambulate. Pt. Requiring  cues for sequence and safety. In comparison to previous session, Pt. With improvements in activity tolerance. Pt is in need of continued activity in PT to improve strength balance endurance mobility transfers and ambulation with return to maximize LOF. From PT/mobility standpoint, recommendation at time of d/c would be HHPT in order to promote return to PLOF and independence. The patient's AM-PAC Basic Mobility Inpatient Short Form Raw Score is 20. A Raw score of greater than 16 suggests the patient may benefit from discharge to home. Please also refer to physical therapy recommendation for safe DC planning. PT Discharge Recommendation: Home with home health rehabilitation    See flowsheet documentation for full assessment.

## 2023-09-20 NOTE — PROGRESS NOTES
"Subjective:      Patient ID: Sergio Parra is a 48 y.o. male.    Chief Complaint: Medication Refill      The patient is here today for routine follow up. Labs drawn earlier discussed today with the patient.  He is requesting refills on Lunesta which he needs to sleep.  Taking alprazolam only occasionally.  Stressors have improved, his daughters cancers is  in remission.    Medication Refill      Review of Systems   Psychiatric/Behavioral:  Positive for sleep disturbance. Negative for dysphoric mood. The patient is not nervous/anxious.      Past Medical History:   Diagnosis Date    Anxiety     Hypercholesteremia     Obesity           Past Surgical History:   Procedure Laterality Date    ANTERIOR CRUCIATE LIGAMENT REPAIR       Family History   Problem Relation Age of Onset    No Known Problems Mother     No Known Problems Father      Social History     Socioeconomic History    Marital status: Single   Tobacco Use    Smoking status: Never    Smokeless tobacco: Never   Substance and Sexual Activity    Alcohol use: Yes     Alcohol/week: 0.0 standard drinks of alcohol    Drug use: No    Sexual activity: Yes     Partners: Female     Social Determinants of Health     Stress: Stress Concern Present (3/9/2020)    Citizen of [place] of Occupational [employer] - Occupational Stress Questionnaire     Feeling of Stress : To some extent     Review of patient's allergies indicates:  No Known Allergies    Objective:       BP (!) 130/100 (BP Location: Right arm, Patient Position: Sitting, BP Method: Large (Manual))   Pulse 96   Temp 97.9 °F (36.6 °C) (Tympanic)   Resp 20   Ht 5' 5.75" (1.67 m)   Wt (!) 166.6 kg (367 lb 4.6 oz)   SpO2 100%   BMI 59.73 kg/m²   Physical Exam  Constitutional:       General: He is not in acute distress.     Appearance: Normal appearance. He is well-developed. He is obese. He is not ill-appearing or diaphoretic.   Cardiovascular:      Rate and Rhythm: Normal rate and regular rhythm.      Heart " sounds: Normal heart sounds.   Pulmonary:      Effort: Pulmonary effort is normal.      Breath sounds: Normal breath sounds.   Neurological:      General: No focal deficit present.      Mental Status: He is alert and oriented to person, place, and time.   Psychiatric:         Mood and Affect: Mood normal.         Behavior: Behavior normal.         Thought Content: Thought content normal.         Judgment: Judgment normal.       Assessment:     1. Insomnia, unspecified type    2. Anxiety    3. Elevated triglycerides with high cholesterol    4. Hypertension, unspecified type      Plan:   Insomnia, unspecified type  -     eszopiclone (LUNESTA) 2 MG Tab; Take 1 tablet (2 mg total) by mouth every evening.  Dispense: 30 tablet; Refill: 5    Anxiety  -     ALPRAZolam (XANAX) 1 MG tablet; TAKE 1 TABLET BY MOUTH ONCE DAILY AS NEEDED FOR ANXIETY  Dispense: 30 tablet; Refill: 3    Elevated triglycerides with high cholesterol    Hypertension, unspecified type    Patient again declines treatment for blood pressure or elevated lipids.  He will be finding a new primary care doctor-is unhappy that I will not prescribe Adderall or Vyvanse without evaluation  Medication List with Changes/Refills   Current Medications    FENOFIBRATE 160 MG TAB    Take 1 tablet (160 mg total) by mouth once daily.    SILDENAFIL (VIAGRA) 100 MG TABLET    Take 1 tablet (100 mg total) by mouth daily as needed for Erectile Dysfunction.   Changed and/or Refilled Medications    Modified Medication Previous Medication    ALPRAZOLAM (XANAX) 1 MG TABLET ALPRAZolam (XANAX) 1 MG tablet       TAKE 1 TABLET BY MOUTH ONCE DAILY AS NEEDED FOR ANXIETY    TAKE 1 TABLET BY MOUTH ONCE DAILY AS NEEDED FOR ANXIETY    ESZOPICLONE (LUNESTA) 2 MG TAB eszopiclone (LUNESTA) 2 MG Tab       Take 1 tablet (2 mg total) by mouth every evening.    Take 1 tablet (2 mg total) by mouth every evening.   Discontinued Medications    HYDROCODONE-ACETAMINOPHEN (NORCO) 7.5-325 MG PER TABLET     Take 1 tablet by mouth every 6 (six) hours as needed for Pain.

## 2023-09-27 RX ORDER — FENOFIBRATE 160 MG/1
160 TABLET ORAL
Qty: 30 TABLET | Refills: 5 | Status: SHIPPED | OUTPATIENT
Start: 2023-09-27 | End: 2024-03-19

## 2023-09-27 NOTE — TELEPHONE ENCOUNTER
Refill Routing Note   Medication(s) are not appropriate for processing by [facility] for the following reason(s):      Non-participating provider    [facility] action(s):  Route Care Due:  None identified            Appointments  past 12m or future 3m with PCP    Date Provider   Last Visit   9/19/2023 Kayla Perez MD   Next Visit   Visit date not found Kayla Perez MD   ED visits in past 90 days: 0        Note composed:9:38 AM 09/27/2023

## 2023-10-13 ENCOUNTER — OFFICE VISIT (OUTPATIENT)
Dept: FAMILY MEDICINE | Facility: CLINIC | Age: 48
End: 2023-10-13
Payer: COMMERCIAL

## 2023-10-13 VITALS
RESPIRATION RATE: 18 BRPM | WEIGHT: 315 LBS | TEMPERATURE: 98 F | SYSTOLIC BLOOD PRESSURE: 138 MMHG | DIASTOLIC BLOOD PRESSURE: 100 MMHG | OXYGEN SATURATION: 95 % | BODY MASS INDEX: 60.09 KG/M2 | HEART RATE: 95 BPM

## 2023-10-13 DIAGNOSIS — E78.1 HYPERTRIGLYCERIDEMIA: ICD-10-CM

## 2023-10-13 DIAGNOSIS — E66.01 CLASS 3 SEVERE OBESITY DUE TO EXCESS CALORIES WITH SERIOUS COMORBIDITY AND BODY MASS INDEX (BMI) OF 50.0 TO 59.9 IN ADULT: ICD-10-CM

## 2023-10-13 DIAGNOSIS — G47.33 OSA ON CPAP: ICD-10-CM

## 2023-10-13 DIAGNOSIS — I10 HYPERTENSION, UNSPECIFIED TYPE: ICD-10-CM

## 2023-10-13 DIAGNOSIS — R41.840 LACK OF CONCENTRATION: ICD-10-CM

## 2023-10-13 DIAGNOSIS — E78.00 HYPERCHOLESTEREMIA: Primary | ICD-10-CM

## 2023-10-13 PROCEDURE — 1159F MED LIST DOCD IN RCRD: CPT | Mod: CPTII,S$GLB,, | Performed by: INTERNAL MEDICINE

## 2023-10-13 PROCEDURE — 99214 PR OFFICE/OUTPT VISIT, EST, LEVL IV, 30-39 MIN: ICD-10-PCS | Mod: S$GLB,,, | Performed by: INTERNAL MEDICINE

## 2023-10-13 PROCEDURE — 1159F PR MEDICATION LIST DOCUMENTED IN MEDICAL RECORD: ICD-10-PCS | Mod: CPTII,S$GLB,, | Performed by: INTERNAL MEDICINE

## 2023-10-13 PROCEDURE — 99999 PR PBB SHADOW E&M-EST. PATIENT-LVL IV: CPT | Mod: PBBFAC,,, | Performed by: INTERNAL MEDICINE

## 2023-10-13 PROCEDURE — 3075F SYST BP GE 130 - 139MM HG: CPT | Mod: CPTII,S$GLB,, | Performed by: INTERNAL MEDICINE

## 2023-10-13 PROCEDURE — 3080F DIAST BP >= 90 MM HG: CPT | Mod: CPTII,S$GLB,, | Performed by: INTERNAL MEDICINE

## 2023-10-13 PROCEDURE — 3008F BODY MASS INDEX DOCD: CPT | Mod: CPTII,S$GLB,, | Performed by: INTERNAL MEDICINE

## 2023-10-13 PROCEDURE — 99214 OFFICE O/P EST MOD 30 MIN: CPT | Mod: S$GLB,,, | Performed by: INTERNAL MEDICINE

## 2023-10-13 PROCEDURE — 99999 PR PBB SHADOW E&M-EST. PATIENT-LVL IV: ICD-10-PCS | Mod: PBBFAC,,, | Performed by: INTERNAL MEDICINE

## 2023-10-13 PROCEDURE — 3075F PR MOST RECENT SYSTOLIC BLOOD PRESS GE 130-139MM HG: ICD-10-PCS | Mod: CPTII,S$GLB,, | Performed by: INTERNAL MEDICINE

## 2023-10-13 PROCEDURE — 3008F PR BODY MASS INDEX (BMI) DOCUMENTED: ICD-10-PCS | Mod: CPTII,S$GLB,, | Performed by: INTERNAL MEDICINE

## 2023-10-13 PROCEDURE — 3080F PR MOST RECENT DIASTOLIC BLOOD PRESSURE >= 90 MM HG: ICD-10-PCS | Mod: CPTII,S$GLB,, | Performed by: INTERNAL MEDICINE

## 2023-10-13 NOTE — PROGRESS NOTES
Subjective:       Patient ID: Sergio Parra is a 48 y.o. male.    Chief Complaint: Establish Care, Hypertension, and Hyperlipidemia    Hypertension  Pertinent negatives include no chest pain, headaches, neck pain, palpitations or shortness of breath.   Hyperlipidemia  Associated symptoms include myalgias. Pertinent negatives include no chest pain or shortness of breath.     Past Medical History:   Diagnosis Date    Anxiety     Hypercholesteremia     Obesity      Past Surgical History:   Procedure Laterality Date    ANTERIOR CRUCIATE LIGAMENT REPAIR       Family History   Problem Relation Age of Onset    No Known Problems Mother     No Known Problems Father      Social History     Socioeconomic History    Marital status: Single   Tobacco Use    Smoking status: Never    Smokeless tobacco: Never   Substance and Sexual Activity    Alcohol use: Yes     Alcohol/week: 0.0 standard drinks of alcohol    Drug use: No    Sexual activity: Yes     Partners: Female     Social Determinants of Health     Stress: Stress Concern Present (3/9/2020)    [employer] - Occupational Stress Questionnaire     Feeling of Stress : To some extent     Review of Systems   Constitutional:  Positive for fatigue. Negative for activity change, appetite change, chills, diaphoresis, fever and unexpected weight change.   HENT:  Negative for drooling, ear discharge, ear pain, facial swelling, hearing loss, mouth sores, nosebleeds, postnasal drip, rhinorrhea, sinus pressure, sneezing, sore throat, tinnitus, trouble swallowing and voice change.    Eyes:  Negative for photophobia, redness and visual disturbance.   Respiratory:  Negative for apnea, cough, choking, chest tightness, shortness of breath and wheezing.    Cardiovascular:  Negative for chest pain and palpitations.   Gastrointestinal:  Negative for abdominal distention, abdominal pain, anal bleeding, blood in stool, constipation, diarrhea, nausea and vomiting.    Endocrine: Negative for cold intolerance, heat intolerance, polydipsia, polyphagia and polyuria.   Genitourinary:  Negative for difficulty urinating, dysuria, enuresis, flank pain, frequency, genital sores, hematuria and urgency.   Musculoskeletal:  Positive for arthralgias and myalgias. Negative for back pain, gait problem, joint swelling, neck pain and neck stiffness.   Skin:  Negative for color change, pallor, rash and wound.   Allergic/Immunologic: Negative for food allergies and immunocompromised state.   Neurological:  Negative for dizziness, tremors, seizures, syncope, facial asymmetry, speech difficulty, weakness, light-headedness, numbness and headaches.   Hematological:  Negative for adenopathy. Does not bruise/bleed easily.   Psychiatric/Behavioral:  Positive for decreased concentration. Negative for agitation, behavioral problems, confusion, dysphoric mood, hallucinations, self-injury, sleep disturbance and suicidal ideas. The patient is not nervous/anxious and is not hyperactive.        Objective:      Physical Exam  Vitals and nursing note reviewed.   Constitutional:       General: He is not in acute distress.     Appearance: Normal appearance. He is well-developed. He is not diaphoretic.   HENT:      Head: Normocephalic and atraumatic.      Mouth/Throat:      Pharynx: No oropharyngeal exudate.   Eyes:      General: No scleral icterus.     Pupils: Pupils are equal, round, and reactive to light.   Neck:      Thyroid: No thyromegaly.      Vascular: No carotid bruit or JVD.      Trachea: No tracheal deviation.   Cardiovascular:      Rate and Rhythm: Normal rate and regular rhythm.      Heart sounds: Normal heart sounds.   Pulmonary:      Effort: Pulmonary effort is normal. No respiratory distress.      Breath sounds: Normal breath sounds. No wheezing or rales.   Chest:      Chest wall: No tenderness.   Abdominal:      General: Bowel sounds are normal. There is no distension.      Palpations: Abdomen is  soft.      Tenderness: There is no abdominal tenderness. There is no guarding or rebound.   Musculoskeletal:         General: No tenderness. Normal range of motion.      Cervical back: Normal range of motion and neck supple.   Lymphadenopathy:      Cervical: No cervical adenopathy.   Skin:     General: Skin is warm and dry.      Coloration: Skin is not pale.      Findings: No erythema or rash.   Neurological:      Mental Status: He is alert and oriented to person, place, and time.   Psychiatric:         Behavior: Behavior normal.         Thought Content: Thought content normal.         Judgment: Judgment normal.         CMP  Sodium   Date Value Ref Range Status   09/18/2023 137 136 - 145 mmol/L Final     Potassium   Date Value Ref Range Status   09/18/2023 4.6 3.5 - 5.1 mmol/L Final     Chloride   Date Value Ref Range Status   09/18/2023 101 95 - 110 mmol/L Final     CO2   Date Value Ref Range Status   09/18/2023 28 23 - 29 mmol/L Final     Glucose   Date Value Ref Range Status   09/18/2023 105 70 - 110 mg/dL Final     BUN   Date Value Ref Range Status   09/18/2023 14 6 - 20 mg/dL Final     Creatinine   Date Value Ref Range Status   09/18/2023 1.0 0.5 - 1.4 mg/dL Final     Calcium   Date Value Ref Range Status   09/18/2023 9.6 8.7 - 10.5 mg/dL Final     Total Protein   Date Value Ref Range Status   09/18/2023 7.5 6.0 - 8.4 g/dL Final     Albumin   Date Value Ref Range Status   09/18/2023 4.1 3.5 - 5.2 g/dL Final     Total Bilirubin   Date Value Ref Range Status   09/18/2023 0.6 0.1 - 1.0 mg/dL Final     Comment:     For infants and newborns, interpretation of results should be based  on gestational age, weight and in agreement with clinical  observations.    Premature Infant recommended reference ranges:  Up to 24 hours.............<8.0 mg/dL  Up to 48 hours............<12.0 mg/dL  3-5 days..................<15.0 mg/dL  6-29 days.................<15.0 mg/dL       Alkaline Phosphatase   Date Value Ref Range Status  "  09/18/2023 52 (L) 55 - 135 U/L Final     AST   Date Value Ref Range Status   09/18/2023 39 10 - 40 U/L Final     ALT   Date Value Ref Range Status   09/18/2023 48 (H) 10 - 44 U/L Final     Anion Gap   Date Value Ref Range Status   09/18/2023 8 8 - 16 mmol/L Final     eGFR if    Date Value Ref Range Status   02/08/2022 >60.0 >60 mL/min/1.73 m^2 Final     eGFR if non    Date Value Ref Range Status   02/08/2022 >60.0 >60 mL/min/1.73 m^2 Final     Comment:     Calculation used to obtain the estimated glomerular filtration  rate (eGFR) is the CKD-EPI equation.        Lab Results   Component Value Date    WBC 6.99 09/18/2023    HGB 17.4 09/18/2023    HCT 51.8 09/18/2023    MCV 89 09/18/2023     09/18/2023     Lab Results   Component Value Date    CHOL 273 (H) 09/18/2023     Lab Results   Component Value Date    HDL 41 09/18/2023     Lab Results   Component Value Date    LDLCALC Invalid, Trig>400.0 09/18/2023     Lab Results   Component Value Date    TRIG 553 (H) 09/18/2023     Lab Results   Component Value Date    CHOLHDL 15.0 (L) 09/18/2023     No results found for: "TSH"  Lab Results   Component Value Date    HGBA1C 5.6 04/27/2021     Assessment:       1. Hypercholesteremia    2. Class 3 severe obesity due to excess calories with serious comorbidity and body mass index (BMI) of 50.0 to 59.9 in adult    3. Hypertension, unspecified type    4. Hypertriglyceridemia    5. EMILY on CPAP    6. Lack of concentration        Plan:   Hypercholesteremia-------------------takes fenofibrate------------decline statin at this time---------    Class 3 severe obesity due to excess calories with serious comorbidity and body mass index (BMI) of 50.0 to 59.9 in adult-----------watch diet, exercise, weight loss----------------    Hypertension, unspecified type----------declines bp med at this time    Hypertriglyceridemia----------on fenofibrate---------    EMILY on CPAP    Lack of " concentration------------------has adhd eval with dr chu at [facility]-----10--------------------      Declines additional labs at this time-----------------as above-----------------f/u 3 months-------

## 2023-12-01 RX ORDER — SILDENAFIL 100 MG/1
100 TABLET, FILM COATED ORAL DAILY PRN
Qty: 15 TABLET | Refills: 6 | Status: SHIPPED | OUTPATIENT
Start: 2023-12-01 | End: 2024-11-30

## 2023-12-01 NOTE — TELEPHONE ENCOUNTER
No care due was identified.  [facility] Embedded Care Due Messages. Reference number: [id number].   12/01/2023 3:35:16 PM CST

## 2023-12-07 ENCOUNTER — PATIENT OUTREACH (OUTPATIENT)
Dept: ADMINISTRATIVE | Facility: HOSPITAL | Age: 48
End: 2023-12-07
Payer: COMMERCIAL

## 2023-12-07 NOTE — PROGRESS NOTES
OHN HTN REPORT: per chart review pt last documented bp was elevated, spoke to pt he does not have a bp cuff at home, and declined appt in office for bp check or follow up with pcp.

## 2024-02-20 ENCOUNTER — TELEPHONE (OUTPATIENT)
Dept: FAMILY MEDICINE | Facility: CLINIC | Age: 49
End: 2024-02-20
Payer: COMMERCIAL

## 2024-02-20 DIAGNOSIS — Z13.39 ADHD (ATTENTION DEFICIT HYPERACTIVITY DISORDER) EVALUATION: Primary | ICD-10-CM

## 2024-02-20 NOTE — TELEPHONE ENCOUNTER
----- Message from Michelle Moss sent at 2/20/2024  1:36 PM CST -----  Type: Patient Call Back    Who called:pt     What is the request in detail:pt requesting to speak to nurse in regards to a ADHD eval and wanted to discuss getting on adderral prescription. Call pt     Can the clinic reply by [facility]?    Would the patient rather a call back or a response via [facility]? call    Best call back number:[phone] (home)       Additional Information:

## 2024-03-12 ENCOUNTER — LAB VISIT (OUTPATIENT)
Dept: LAB | Facility: HOSPITAL | Age: 49
End: 2024-03-12
Attending: INTERNAL MEDICINE
Payer: COMMERCIAL

## 2024-03-12 ENCOUNTER — OFFICE VISIT (OUTPATIENT)
Dept: FAMILY MEDICINE | Facility: CLINIC | Age: 49
End: 2024-03-12
Payer: COMMERCIAL

## 2024-03-12 VITALS
HEART RATE: 94 BPM | OXYGEN SATURATION: 95 % | SYSTOLIC BLOOD PRESSURE: 134 MMHG | RESPIRATION RATE: 18 BRPM | BODY MASS INDEX: 59.47 KG/M2 | DIASTOLIC BLOOD PRESSURE: 84 MMHG | TEMPERATURE: 98 F | HEIGHT: 61 IN | WEIGHT: 315 LBS

## 2024-03-12 DIAGNOSIS — R41.89 COGNITIVE DEFICITS: Primary | ICD-10-CM

## 2024-03-12 DIAGNOSIS — F41.9 ANXIETY: ICD-10-CM

## 2024-03-12 DIAGNOSIS — Z00.00 ROUTINE ADULT HEALTH MAINTENANCE: ICD-10-CM

## 2024-03-12 DIAGNOSIS — R41.840 LACK OF CONCENTRATION: ICD-10-CM

## 2024-03-12 DIAGNOSIS — G47.30 SLEEP APNEA, UNSPECIFIED TYPE: ICD-10-CM

## 2024-03-12 LAB
ALBUMIN SERPL BCP-MCNC: 3.7 G/DL (ref 3.5–5.2)
ALP SERPL-CCNC: 70 U/L (ref 55–135)
ALT SERPL W/O P-5'-P-CCNC: 39 U/L (ref 10–44)
ANION GAP SERPL CALC-SCNC: 10 MMOL/L (ref 8–16)
AST SERPL-CCNC: 27 U/L (ref 10–40)
BASOPHILS # BLD AUTO: 0.09 K/UL (ref 0–0.2)
BASOPHILS NFR BLD: 1.3 % (ref 0–1.9)
BILIRUB SERPL-MCNC: 0.4 MG/DL (ref 0.1–1)
BUN SERPL-MCNC: 17 MG/DL (ref 6–20)
CALCIUM SERPL-MCNC: 9.9 MG/DL (ref 8.7–10.5)
CHLORIDE SERPL-SCNC: 102 MMOL/L (ref 95–110)
CHOLEST SERPL-MCNC: 235 MG/DL (ref 120–199)
CHOLEST/HDLC SERPL: 6.2 {RATIO} (ref 2–5)
CO2 SERPL-SCNC: 27 MMOL/L (ref 23–29)
COMPLEXED PSA SERPL-MCNC: 0.43 NG/ML (ref 0–4)
CREAT SERPL-MCNC: 0.9 MG/DL (ref 0.5–1.4)
DIFFERENTIAL METHOD BLD: ABNORMAL
EOSINOPHIL # BLD AUTO: 0.1 K/UL (ref 0–0.5)
EOSINOPHIL NFR BLD: 1.3 % (ref 0–8)
ERYTHROCYTE [DISTWIDTH] IN BLOOD BY AUTOMATED COUNT: 11.9 % (ref 11.5–14.5)
EST. GFR  (NO RACE VARIABLE): >60 ML/MIN/1.73 M^2
ESTIMATED AVG GLUCOSE: 157 MG/DL (ref 68–131)
GLUCOSE SERPL-MCNC: 143 MG/DL (ref 70–110)
HBA1C MFR BLD: 7.1 % (ref 4–5.6)
HCT VFR BLD AUTO: 52.2 % (ref 40–54)
HDLC SERPL-MCNC: 38 MG/DL (ref 40–75)
HDLC SERPL: 16.2 % (ref 20–50)
HGB BLD-MCNC: 17.3 G/DL (ref 14–18)
IMM GRANULOCYTES # BLD AUTO: 0.15 K/UL (ref 0–0.04)
IMM GRANULOCYTES NFR BLD AUTO: 2.1 % (ref 0–0.5)
LDLC SERPL CALC-MCNC: ABNORMAL MG/DL (ref 63–159)
LYMPHOCYTES # BLD AUTO: 2.9 K/UL (ref 1–4.8)
LYMPHOCYTES NFR BLD: 40.3 % (ref 18–48)
MCH RBC QN AUTO: 31.1 PG (ref 27–31)
MCHC RBC AUTO-ENTMCNC: 33.1 G/DL (ref 32–36)
MCV RBC AUTO: 94 FL (ref 82–98)
MONOCYTES # BLD AUTO: 0.7 K/UL (ref 0.3–1)
MONOCYTES NFR BLD: 9.4 % (ref 4–15)
NEUTROPHILS # BLD AUTO: 3.3 K/UL (ref 1.8–7.7)
NEUTROPHILS NFR BLD: 45.6 % (ref 38–73)
NONHDLC SERPL-MCNC: 197 MG/DL
NRBC BLD-RTO: 0 /100 WBC
PLATELET # BLD AUTO: 211 K/UL (ref 150–450)
PMV BLD AUTO: 11.8 FL (ref 9.2–12.9)
POTASSIUM SERPL-SCNC: 4.2 MMOL/L (ref 3.5–5.1)
PROT SERPL-MCNC: 7.1 G/DL (ref 6–8.4)
RBC # BLD AUTO: 5.57 M/UL (ref 4.6–6.2)
SODIUM SERPL-SCNC: 139 MMOL/L (ref 136–145)
TESTOST SERPL-MCNC: 434 NG/DL (ref 304–1227)
TRIGL SERPL-MCNC: 504 MG/DL (ref 30–150)
TSH SERPL DL<=0.005 MIU/L-ACNC: 3.32 UIU/ML (ref 0.4–4)
WBC # BLD AUTO: 7.13 K/UL (ref 3.9–12.7)

## 2024-03-12 PROCEDURE — 85025 COMPLETE CBC W/AUTO DIFF WBC: CPT | Performed by: INTERNAL MEDICINE

## 2024-03-12 PROCEDURE — 80053 COMPREHEN METABOLIC PANEL: CPT | Performed by: INTERNAL MEDICINE

## 2024-03-12 PROCEDURE — 99396 PREV VISIT EST AGE 40-64: CPT | Mod: S$GLB,,, | Performed by: INTERNAL MEDICINE

## 2024-03-12 PROCEDURE — 84403 ASSAY OF TOTAL TESTOSTERONE: CPT | Performed by: INTERNAL MEDICINE

## 2024-03-12 PROCEDURE — 3079F DIAST BP 80-89 MM HG: CPT | Mod: CPTII,S$GLB,, | Performed by: INTERNAL MEDICINE

## 2024-03-12 PROCEDURE — 36415 COLL VENOUS BLD VENIPUNCTURE: CPT | Mod: PO | Performed by: INTERNAL MEDICINE

## 2024-03-12 PROCEDURE — 3008F BODY MASS INDEX DOCD: CPT | Mod: CPTII,S$GLB,, | Performed by: INTERNAL MEDICINE

## 2024-03-12 PROCEDURE — 1159F MED LIST DOCD IN RCRD: CPT | Mod: CPTII,S$GLB,, | Performed by: INTERNAL MEDICINE

## 2024-03-12 PROCEDURE — 84153 ASSAY OF PSA TOTAL: CPT | Performed by: INTERNAL MEDICINE

## 2024-03-12 PROCEDURE — 99999 PR PBB SHADOW E&M-EST. PATIENT-LVL IV: CPT | Mod: PBBFAC,,, | Performed by: INTERNAL MEDICINE

## 2024-03-12 PROCEDURE — 3075F SYST BP GE 130 - 139MM HG: CPT | Mod: CPTII,S$GLB,, | Performed by: INTERNAL MEDICINE

## 2024-03-12 PROCEDURE — 83036 HEMOGLOBIN GLYCOSYLATED A1C: CPT | Performed by: INTERNAL MEDICINE

## 2024-03-12 PROCEDURE — 84443 ASSAY THYROID STIM HORMONE: CPT | Performed by: INTERNAL MEDICINE

## 2024-03-12 PROCEDURE — 80061 LIPID PANEL: CPT | Performed by: INTERNAL MEDICINE

## 2024-03-12 RX ORDER — DEXTROAMPHETAMINE SACCHARATE, AMPHETAMINE ASPARTATE MONOHYDRATE, DEXTROAMPHETAMINE SULFATE AND AMPHETAMINE SULFATE 5; 5; 5; 5 MG/1; MG/1; MG/1; MG/1
20 CAPSULE, EXTENDED RELEASE ORAL EVERY MORNING
Qty: 30 CAPSULE | Refills: 0 | Status: SHIPPED | OUTPATIENT
Start: 2024-03-12 | End: 2024-04-11 | Stop reason: SDUPTHER

## 2024-03-12 NOTE — PROGRESS NOTES
Subjective:       Patient ID: Sergio Parra is a 48 y.o. male.    Chief Complaint: ADHD (Discuss meds)    F/u -----seesn by neuromedical neuropsychology dr ronel chu, ph.d-------------did ADHD eval------dx'd with some cognitive deficits--.      Pt has difficulty completing task, concentrating and focusing-has been on adderall and vyvanse in past and they helped him----------      Past Medical History:   Diagnosis Date    Anxiety     Hypercholesteremia     Obesity      Past Surgical History:   Procedure Laterality Date    ANTERIOR CRUCIATE LIGAMENT REPAIR       Family History   Problem Relation Age of Onset    No Known Problems Mother     No Known Problems Father      Social History     Socioeconomic History    Marital status: Single   Tobacco Use    Smoking status: Never    Smokeless tobacco: Never   Substance and Sexual Activity    Alcohol use: Yes     Alcohol/week: 0.0 standard drinks of alcohol    Drug use: No    Sexual activity: Yes     Partners: Female     Social Determinants of Health     Stress: Stress Concern Present (3/9/2020)    [facility] [place] [facility] [employer] - Occupational Stress Questionnaire     Feeling of Stress : To some extent     Review of Systems   Constitutional:  Positive for fatigue. Negative for activity change, appetite change, chills, diaphoresis, fever and unexpected weight change.   HENT:  Negative for drooling, ear discharge, ear pain, facial swelling, hearing loss, mouth sores, nosebleeds, postnasal drip, rhinorrhea, sinus pressure, sneezing, sore throat, tinnitus, trouble swallowing and voice change.    Eyes:  Negative for photophobia, redness and visual disturbance.   Respiratory:  Negative for apnea, cough, choking, chest tightness, shortness of breath and wheezing.    Cardiovascular:  Negative for chest pain, palpitations and leg swelling.   Gastrointestinal:  Negative for abdominal distention, abdominal pain, anal bleeding, blood in stool, constipation, diarrhea,  nausea and vomiting.   Endocrine: Negative for cold intolerance, heat intolerance, polydipsia, polyphagia and polyuria.   Genitourinary:  Negative for difficulty urinating, dysuria, enuresis, flank pain, frequency, genital sores, hematuria and urgency.   Musculoskeletal:  Positive for arthralgias. Negative for back pain, gait problem, joint swelling, myalgias, neck pain and neck stiffness.   Skin:  Negative for color change, pallor, rash and wound.   Allergic/Immunologic: Negative for food allergies and immunocompromised state.   Neurological:  Negative for dizziness, tremors, seizures, syncope, facial asymmetry, speech difficulty, weakness, light-headedness, numbness and headaches.   Hematological:  Negative for adenopathy. Does not bruise/bleed easily.   Psychiatric/Behavioral:  Negative for agitation, behavioral problems, confusion, decreased concentration, dysphoric mood, hallucinations, self-injury, sleep disturbance and suicidal ideas. The patient is not nervous/anxious and is not hyperactive.        Objective:      Physical Exam  Vitals and nursing note reviewed.   Constitutional:       General: He is not in acute distress.     Appearance: Normal appearance. He is well-developed. He is not diaphoretic.   HENT:      Head: Normocephalic and atraumatic.      Mouth/Throat:      Pharynx: No oropharyngeal exudate.   Eyes:      General: No scleral icterus.     Pupils: Pupils are equal, round, and reactive to light.   Neck:      Thyroid: No thyromegaly.      Vascular: No carotid bruit or JVD.      Trachea: No tracheal deviation.   Cardiovascular:      Rate and Rhythm: Normal rate and regular rhythm.      Heart sounds: Normal heart sounds.   Pulmonary:      Effort: Pulmonary effort is normal. No respiratory distress.      Breath sounds: Normal breath sounds. No wheezing or rales.   Chest:      Chest wall: No tenderness.   Abdominal:      General: Bowel sounds are normal. There is no distension.      Palpations:  Abdomen is soft.      Tenderness: There is no abdominal tenderness. There is no guarding or rebound.   Musculoskeletal:         General: No tenderness. Normal range of motion.      Cervical back: Normal range of motion and neck supple.   Lymphadenopathy:      Cervical: No cervical adenopathy.   Skin:     General: Skin is warm and dry.      Coloration: Skin is not pale.      Findings: No erythema or rash.   Neurological:      Mental Status: He is alert and oriented to person, place, and time.   Psychiatric:         Behavior: Behavior normal.         Thought Content: Thought content normal.         Judgment: Judgment normal.         CMP  Sodium   Date Value Ref Range Status   09/18/2023 137 136 - 145 mmol/L Final     Potassium   Date Value Ref Range Status   09/18/2023 4.6 3.5 - 5.1 mmol/L Final     Chloride   Date Value Ref Range Status   09/18/2023 101 95 - 110 mmol/L Final     CO2   Date Value Ref Range Status   09/18/2023 28 23 - 29 mmol/L Final     Glucose   Date Value Ref Range Status   09/18/2023 105 70 - 110 mg/dL Final     BUN   Date Value Ref Range Status   09/18/2023 14 6 - 20 mg/dL Final     Creatinine   Date Value Ref Range Status   09/18/2023 1.0 0.5 - 1.4 mg/dL Final     Calcium   Date Value Ref Range Status   09/18/2023 9.6 8.7 - 10.5 mg/dL Final     Total Protein   Date Value Ref Range Status   09/18/2023 7.5 6.0 - 8.4 g/dL Final     Albumin   Date Value Ref Range Status   09/18/2023 4.1 3.5 - 5.2 g/dL Final     Total Bilirubin   Date Value Ref Range Status   09/18/2023 0.6 0.1 - 1.0 mg/dL Final     Comment:     For infants and newborns, interpretation of results should be based  on gestational age, weight and in agreement with clinical  observations.    Premature Infant recommended reference ranges:  Up to 24 hours.............<8.0 mg/dL  Up to 48 hours............<12.0 mg/dL  3-5 days..................<15.0 mg/dL  6-29 days.................<15.0 mg/dL       Alkaline Phosphatase   Date Value Ref  "Range Status   09/18/2023 52 (L) 55 - 135 U/L Final     AST   Date Value Ref Range Status   09/18/2023 39 10 - 40 U/L Final     ALT   Date Value Ref Range Status   09/18/2023 48 (H) 10 - 44 U/L Final     Anion Gap   Date Value Ref Range Status   09/18/2023 8 8 - 16 mmol/L Final     eGFR if    Date Value Ref Range Status   02/08/2022 >60.0 >60 mL/min/1.73 m^2 Final     eGFR if non    Date Value Ref Range Status   02/08/2022 >60.0 >60 mL/min/1.73 m^2 Final     Comment:     Calculation used to obtain the estimated glomerular filtration  rate (eGFR) is the CKD-EPI equation.        Lab Results   Component Value Date    WBC 6.99 09/18/2023    HGB 17.4 09/18/2023    HCT 51.8 09/18/2023    MCV 89 09/18/2023     09/18/2023     Lab Results   Component Value Date    CHOL 273 (H) 09/18/2023     Lab Results   Component Value Date    HDL 41 09/18/2023     Lab Results   Component Value Date    LDLCALC Invalid, Trig>400.0 09/18/2023     Lab Results   Component Value Date    TRIG 553 (H) 09/18/2023     Lab Results   Component Value Date    CHOLHDL 15.0 (L) 09/18/2023     No results found for: "TSH"  Lab Results   Component Value Date    HGBA1C 5.6 04/27/2021     Assessment:       1. Cognitive deficits    2. Lack of concentration    3. Anxiety    4. Sleep apnea, unspecified type    5. Routine adult health maintenance        Plan:   Cognitive deficits--------------seen by dr [facility]u at [facility]--------  -    trial dextroamphetamine-amphetamine (ADDERALL XR) 20 MG 24 hr capsule; Take 1 capsule (20 mg total) by mouth every morning.  Dispense: 30 capsule; Refill: 0    Lack of concentration  -     dextroamphetamine-amphetamine (ADDERALL XR) 20 MG 24 hr capsule; Take 1 capsule (20 mg total) by mouth every morning.  Dispense: 30 capsule; Refill: 0    Anxiety---------------xanax prn--------    Sleep apnea, unspecified type---------------does not use CPAP---------------declines f/u for " this-----------------------    Routine adult health maintenance  -     Comprehensive Metabolic Panel; Future; Expected date: 03/12/2024  -     CBC Auto Differential; Future; Expected date: 03/12/2024  -     Lipid Panel; Future; Expected date: 03/12/2024  -     TSH; Future; Expected date: 03/12/2024  -     PSA, Screening; Future; Expected date: 03/12/2024  -     Hemoglobin A1C; Future; Expected date: 03/12/2024  -     Testosterone; Future; Expected date: 03/12/2024      As above----------------------f/u 1 month----------------

## 2024-03-13 ENCOUNTER — TELEPHONE (OUTPATIENT)
Dept: FAMILY MEDICINE | Facility: CLINIC | Age: 49
End: 2024-03-13
Payer: COMMERCIAL

## 2024-03-13 PROBLEM — E11.65 TYPE 2 DIABETES MELLITUS WITH HYPERGLYCEMIA, WITHOUT LONG-TERM CURRENT USE OF INSULIN: Status: ACTIVE | Noted: 2024-03-13

## 2024-03-13 NOTE — TELEPHONE ENCOUNTER
Labs show pt is now diabetic---------?would be interested in med like ozempic-----------also-----cholesterol and triglycerides high-----------please make f/u appt with me next week to discuss----------

## 2024-03-13 NOTE — TELEPHONE ENCOUNTER
Pt. Verbalized understanding. States he would be comfortable taking ozempic if he could get insurance to cover it. Pt. Would like f/u to be virtual if possible.

## 2024-03-18 NOTE — TELEPHONE ENCOUNTER
No care due was identified.  [facility] Embedded Care Due Messages. Reference number: [id number].   3/18/2024 2:06:23 PM CDT

## 2024-03-19 RX ORDER — FENOFIBRATE 160 MG/1
160 TABLET ORAL DAILY
Qty: 90 TABLET | Refills: 3 | Status: SHIPPED | OUTPATIENT
Start: 2024-03-19

## 2024-03-19 NOTE — TELEPHONE ENCOUNTER
Refill Routing Note   Medication(s) are not appropriate for processing by [facility] for the following reason(s):        No active prescription written by provider    [facility] action(s):  Defer               Appointments  past 12m or future 3m with PCP    Date Provider   Last Visit   3/12/2024 Avila Nicholas MD   Next Visit   4/9/2024 Avila Nicholas MD   ED visits in past 90 days: 0        Note composed:12:54 PM 03/19/2024

## 2024-04-02 DIAGNOSIS — G47.00 INSOMNIA, UNSPECIFIED TYPE: ICD-10-CM

## 2024-04-02 NOTE — TELEPHONE ENCOUNTER
No care due was identified.  [facility] Embedded Care Due Messages. Reference number: [id number].   4/02/2024 8:16:14 AM CDT

## 2024-04-03 ENCOUNTER — PATIENT MESSAGE (OUTPATIENT)
Dept: PULMONOLOGY | Facility: CLINIC | Age: 49
End: 2024-04-03
Payer: COMMERCIAL

## 2024-04-03 RX ORDER — ESZOPICLONE 2 MG/1
2 TABLET, FILM COATED ORAL NIGHTLY
Qty: 30 TABLET | Refills: 0 | Status: SHIPPED | OUTPATIENT
Start: 2024-04-03 | End: 2024-05-01 | Stop reason: SDUPTHER

## 2024-04-09 ENCOUNTER — OFFICE VISIT (OUTPATIENT)
Dept: FAMILY MEDICINE | Facility: CLINIC | Age: 49
End: 2024-04-09
Payer: COMMERCIAL

## 2024-04-09 VITALS
HEART RATE: 88 BPM | TEMPERATURE: 98 F | OXYGEN SATURATION: 96 % | WEIGHT: 315 LBS | BODY MASS INDEX: 61.84 KG/M2 | SYSTOLIC BLOOD PRESSURE: 132 MMHG | RESPIRATION RATE: 18 BRPM | DIASTOLIC BLOOD PRESSURE: 80 MMHG | HEIGHT: 60 IN

## 2024-04-09 DIAGNOSIS — E78.00 HYPERCHOLESTEREMIA: ICD-10-CM

## 2024-04-09 DIAGNOSIS — F41.9 ANXIETY: ICD-10-CM

## 2024-04-09 DIAGNOSIS — G47.30 SLEEP APNEA, UNSPECIFIED TYPE: ICD-10-CM

## 2024-04-09 DIAGNOSIS — R41.89 COGNITIVE DEFICITS: ICD-10-CM

## 2024-04-09 DIAGNOSIS — M79.672 LEFT FOOT PAIN: ICD-10-CM

## 2024-04-09 DIAGNOSIS — F32.A DEPRESSION, UNSPECIFIED DEPRESSION TYPE: Primary | ICD-10-CM

## 2024-04-09 DIAGNOSIS — E66.01 CLASS 3 SEVERE OBESITY DUE TO EXCESS CALORIES WITH SERIOUS COMORBIDITY AND BODY MASS INDEX (BMI) OF 50.0 TO 59.9 IN ADULT: ICD-10-CM

## 2024-04-09 DIAGNOSIS — E11.65 TYPE 2 DIABETES MELLITUS WITH HYPERGLYCEMIA, WITHOUT LONG-TERM CURRENT USE OF INSULIN: ICD-10-CM

## 2024-04-09 DIAGNOSIS — E78.1 HYPERTRIGLYCERIDEMIA: ICD-10-CM

## 2024-04-09 PROCEDURE — 3079F DIAST BP 80-89 MM HG: CPT | Mod: CPTII,S$GLB,, | Performed by: INTERNAL MEDICINE

## 2024-04-09 PROCEDURE — 3051F HG A1C>EQUAL 7.0%<8.0%: CPT | Mod: CPTII,S$GLB,, | Performed by: INTERNAL MEDICINE

## 2024-04-09 PROCEDURE — 3008F BODY MASS INDEX DOCD: CPT | Mod: CPTII,S$GLB,, | Performed by: INTERNAL MEDICINE

## 2024-04-09 PROCEDURE — 99999 PR PBB SHADOW E&M-EST. PATIENT-LVL III: CPT | Mod: PBBFAC,,, | Performed by: INTERNAL MEDICINE

## 2024-04-09 PROCEDURE — 99396 PREV VISIT EST AGE 40-64: CPT | Mod: S$GLB,,, | Performed by: INTERNAL MEDICINE

## 2024-04-09 PROCEDURE — 3075F SYST BP GE 130 - 139MM HG: CPT | Mod: CPTII,S$GLB,, | Performed by: INTERNAL MEDICINE

## 2024-04-09 RX ORDER — METFORMIN HYDROCHLORIDE 500 MG/1
500 TABLET, EXTENDED RELEASE ORAL
Qty: 90 TABLET | Refills: 3 | Status: SHIPPED | OUTPATIENT
Start: 2024-04-09 | End: 2025-04-09

## 2024-04-09 NOTE — PROGRESS NOTES
Subjective:       Patient ID: Sergio Parra is a 48 y.o. male.    Chief Complaint: Follow-up, Hyperlipidemia, and Diabetes    Doing better with adderall---------    Follow-up  Associated symptoms include arthralgias. Pertinent negatives include no abdominal pain, chest pain, chills, coughing, diaphoresis, fatigue, fever, headaches, joint swelling, myalgias, nausea, neck pain, numbness, rash, sore throat, vomiting or weakness.   Hyperlipidemia  Pertinent negatives include no chest pain, myalgias or shortness of breath.   Diabetes  Pertinent negatives for hypoglycemia include no confusion, dizziness, headaches, nervousness/anxiousness, pallor, seizures, speech difficulty or tremors. Pertinent negatives for diabetes include no chest pain, no fatigue, no polydipsia, no polyphagia, no polyuria and no weakness.     Past Medical History:   Diagnosis Date    Anxiety     Hypercholesteremia     Obesity      Past Surgical History:   Procedure Laterality Date    ANTERIOR CRUCIATE LIGAMENT REPAIR       Family History   Problem Relation Age of Onset    No Known Problems Mother     No Known Problems Father      Social History     Socioeconomic History    Marital status: Single   Tobacco Use    Smoking status: Never    Smokeless tobacco: Never   Substance and Sexual Activity    Alcohol use: Yes     Alcohol/week: 0.0 standard drinks of alcohol    Drug use: No    Sexual activity: Yes     Partners: Female     Social Determinants of Health     Stress: Stress Concern Present (3/9/2020)    [employer] [place] [employer] - Occupational Stress Questionnaire     Feeling of Stress : To some extent     Review of Systems   Constitutional:  Negative for activity change, appetite change, chills, diaphoresis, fatigue, fever and unexpected weight change.   HENT:  Negative for drooling, ear discharge, ear pain, facial swelling, hearing loss, mouth sores, nosebleeds, postnasal drip, rhinorrhea, sinus pressure, sneezing, sore throat,  tinnitus, trouble swallowing and voice change.    Eyes:  Negative for photophobia, redness and visual disturbance.   Respiratory:  Negative for apnea, cough, choking, chest tightness, shortness of breath and wheezing.    Cardiovascular:  Negative for chest pain and palpitations.   Gastrointestinal:  Negative for abdominal distention, abdominal pain, anal bleeding, blood in stool, constipation, diarrhea, nausea and vomiting.   Endocrine: Negative for cold intolerance, heat intolerance, polydipsia, polyphagia and polyuria.   Genitourinary:  Negative for difficulty urinating, dysuria, enuresis, flank pain, frequency, genital sores, hematuria and urgency.   Musculoskeletal:  Positive for arthralgias. Negative for back pain, gait problem, joint swelling, myalgias, neck pain and neck stiffness.   Skin:  Negative for color change, pallor, rash and wound.   Allergic/Immunologic: Negative for food allergies and immunocompromised state.   Neurological:  Negative for dizziness, tremors, seizures, syncope, facial asymmetry, speech difficulty, weakness, light-headedness, numbness and headaches.   Hematological:  Negative for adenopathy. Does not bruise/bleed easily.   Psychiatric/Behavioral:  Negative for agitation, behavioral problems, confusion, decreased concentration, dysphoric mood, hallucinations, self-injury, sleep disturbance and suicidal ideas. The patient is not nervous/anxious and is not hyperactive.        Objective:      Physical Exam  Vitals and nursing note reviewed.   Constitutional:       General: He is not in acute distress.     Appearance: Normal appearance. He is well-developed. He is not diaphoretic.   HENT:      Head: Normocephalic and atraumatic.      Mouth/Throat:      Pharynx: No oropharyngeal exudate.   Eyes:      General: No scleral icterus.     Pupils: Pupils are equal, round, and reactive to light.   Neck:      Thyroid: No thyromegaly.      Vascular: No carotid bruit or JVD.      Trachea: No tracheal  deviation.   Cardiovascular:      Rate and Rhythm: Normal rate and regular rhythm.      Heart sounds: Normal heart sounds.   Pulmonary:      Effort: Pulmonary effort is normal. No respiratory distress.      Breath sounds: Normal breath sounds. No wheezing or rales.   Chest:      Chest wall: No tenderness.   Abdominal:      General: Bowel sounds are normal. There is no distension.      Palpations: Abdomen is soft.      Tenderness: There is no abdominal tenderness. There is no guarding or rebound.   Musculoskeletal:         General: No tenderness. Normal range of motion.      Cervical back: Normal range of motion and neck supple.   Lymphadenopathy:      Cervical: No cervical adenopathy.   Skin:     General: Skin is warm and dry.      Coloration: Skin is not pale.      Findings: No erythema or rash.   Neurological:      Mental Status: He is alert and oriented to person, place, and time.   Psychiatric:         Behavior: Behavior normal.         Thought Content: Thought content normal.         Judgment: Judgment normal.         CMP  Sodium   Date Value Ref Range Status   03/12/2024 139 136 - 145 mmol/L Final     Potassium   Date Value Ref Range Status   03/12/2024 4.2 3.5 - 5.1 mmol/L Final     Chloride   Date Value Ref Range Status   03/12/2024 102 95 - 110 mmol/L Final     CO2   Date Value Ref Range Status   03/12/2024 27 23 - 29 mmol/L Final     Glucose   Date Value Ref Range Status   03/12/2024 143 (H) 70 - 110 mg/dL Final     BUN   Date Value Ref Range Status   03/12/2024 17 6 - 20 mg/dL Final     Creatinine   Date Value Ref Range Status   03/12/2024 0.9 0.5 - 1.4 mg/dL Final     Calcium   Date Value Ref Range Status   03/12/2024 9.9 8.7 - 10.5 mg/dL Final     Total Protein   Date Value Ref Range Status   03/12/2024 7.1 6.0 - 8.4 g/dL Final     Albumin   Date Value Ref Range Status   03/12/2024 3.7 3.5 - 5.2 g/dL Final     Total Bilirubin   Date Value Ref Range Status   03/12/2024 0.4 0.1 - 1.0 mg/dL Final      Comment:     For infants and newborns, interpretation of results should be based  on gestational age, weight and in agreement with clinical  observations.    Premature Infant recommended reference ranges:  Up to 24 hours.............<8.0 mg/dL  Up to 48 hours............<12.0 mg/dL  3-5 days..................<15.0 mg/dL  6-29 days.................<15.0 mg/dL       Alkaline Phosphatase   Date Value Ref Range Status   03/12/2024 70 55 - 135 U/L Final     AST   Date Value Ref Range Status   03/12/2024 27 10 - 40 U/L Final     ALT   Date Value Ref Range Status   03/12/2024 39 10 - 44 U/L Final     Anion Gap   Date Value Ref Range Status   03/12/2024 10 8 - 16 mmol/L Final     eGFR if    Date Value Ref Range Status   02/08/2022 >60.0 >60 mL/min/1.73 m^2 Final     eGFR if non    Date Value Ref Range Status   02/08/2022 >60.0 >60 mL/min/1.73 m^2 Final     Comment:     Calculation used to obtain the estimated glomerular filtration  rate (eGFR) is the CKD-EPI equation.        Lab Results   Component Value Date    WBC 7.13 03/12/2024    HGB 17.3 03/12/2024    HCT 52.2 03/12/2024    MCV 94 03/12/2024     03/12/2024     Lab Results   Component Value Date    CHOL 235 (H) 03/12/2024     Lab Results   Component Value Date    HDL 38 (L) 03/12/2024     Lab Results   Component Value Date    LDLCALC Invalid, Trig>400.0 03/12/2024     Lab Results   Component Value Date    TRIG 504 (H) 03/12/2024     Lab Results   Component Value Date    CHOLHDL 16.2 (L) 03/12/2024     Lab Results   Component Value Date    TSH 3.322 03/12/2024     Lab Results   Component Value Date    HGBA1C 7.1 (H) 03/12/2024     Assessment:       1. Depression, unspecified depression type    2. Type 2 diabetes mellitus with hyperglycemia, without long-term current use of insulin    3. Sleep apnea, unspecified type    4. Cognitive deficits    5. Hypertriglyceridemia    6. Hypercholesteremia    7. Anxiety    8. Class 3 severe  obesity due to excess calories with serious comorbidity and body mass index (BMI) of 50.0 to 59.9 in adult    9. Left foot pain        Plan:   Depression, unspecified depression type    Type 2 diabetes mellitus with hyperglycemia, without long-term current use of insulin--------------------start metformin ----------follow-    Sleep apnea, unspecified type    Cognitive deficits    Hypertriglyceridemia--------on fenofibrate-------wants to try diet,exercise, weight loss--------consider statin-    Hypercholesteremia    Anxiety    Class 3 severe obesity due to excess calories with serious comorbidity and body mass index (BMI) of 50.0 to 59.9 in adult    Left foot pain---------?gout----------advil prn-  -     Uric Acid; Future; Expected date: 04/09/2024    Other orders  -     metFORMIN (GLUCOPHAGE-XR) 500 MG ER 24hr tablet; Take 1 tablet (500 mg total) by mouth daily with breakfast.  Dispense: 90 tablet; Refill: 3      As above------------continue meds------------------watch diet,exercise weight loss--------------f/u 3 months--------

## 2024-04-11 DIAGNOSIS — R41.89 COGNITIVE DEFICITS: ICD-10-CM

## 2024-04-11 DIAGNOSIS — R41.840 LACK OF CONCENTRATION: ICD-10-CM

## 2024-04-11 NOTE — TELEPHONE ENCOUNTER
No care due was identified.  [facility] Embedded Care Due Messages. Reference number: [id number].   4/11/2024 3:45:06 PM CDT

## 2024-04-12 RX ORDER — DEXTROAMPHETAMINE SACCHARATE, AMPHETAMINE ASPARTATE MONOHYDRATE, DEXTROAMPHETAMINE SULFATE AND AMPHETAMINE SULFATE 5; 5; 5; 5 MG/1; MG/1; MG/1; MG/1
20 CAPSULE, EXTENDED RELEASE ORAL EVERY MORNING
Qty: 30 CAPSULE | Refills: 0 | Status: SHIPPED | OUTPATIENT
Start: 2024-04-12 | End: 2024-05-10 | Stop reason: SDUPTHER

## 2024-04-12 RX ORDER — SILDENAFIL 100 MG/1
100 TABLET, FILM COATED ORAL DAILY PRN
Qty: 15 TABLET | Refills: 6 | Status: SHIPPED | OUTPATIENT
Start: 2024-04-12 | End: 2025-04-12

## 2024-04-20 ENCOUNTER — PATIENT MESSAGE (OUTPATIENT)
Dept: URGENT CARE | Facility: CLINIC | Age: 49
End: 2024-04-20
Payer: COMMERCIAL

## 2024-04-22 ENCOUNTER — OFFICE VISIT (OUTPATIENT)
Dept: FAMILY MEDICINE | Facility: CLINIC | Age: 49
End: 2024-04-22
Payer: COMMERCIAL

## 2024-04-22 ENCOUNTER — TELEPHONE (OUTPATIENT)
Dept: FAMILY MEDICINE | Facility: CLINIC | Age: 49
End: 2024-04-22
Payer: COMMERCIAL

## 2024-04-22 VITALS
RESPIRATION RATE: 22 BRPM | SYSTOLIC BLOOD PRESSURE: 136 MMHG | HEIGHT: 60 IN | WEIGHT: 315 LBS | TEMPERATURE: 98 F | DIASTOLIC BLOOD PRESSURE: 88 MMHG | HEART RATE: 100 BPM | BODY MASS INDEX: 61.84 KG/M2 | OXYGEN SATURATION: 95 %

## 2024-04-22 DIAGNOSIS — M25.561 ACUTE PAIN OF RIGHT KNEE: Primary | ICD-10-CM

## 2024-04-22 DIAGNOSIS — E66.01 CLASS 3 SEVERE OBESITY DUE TO EXCESS CALORIES WITH SERIOUS COMORBIDITY AND BODY MASS INDEX (BMI) OF 50.0 TO 59.9 IN ADULT: ICD-10-CM

## 2024-04-22 DIAGNOSIS — I10 HYPERTENSION, UNSPECIFIED TYPE: ICD-10-CM

## 2024-04-22 DIAGNOSIS — M25.461 SWELLING OF JOINT, KNEE, RIGHT: ICD-10-CM

## 2024-04-22 PROCEDURE — 3008F BODY MASS INDEX DOCD: CPT | Mod: CPTII,S$GLB,, | Performed by: NURSE PRACTITIONER

## 2024-04-22 PROCEDURE — 1159F MED LIST DOCD IN RCRD: CPT | Mod: CPTII,S$GLB,, | Performed by: NURSE PRACTITIONER

## 2024-04-22 PROCEDURE — 99214 OFFICE O/P EST MOD 30 MIN: CPT | Mod: S$GLB,,, | Performed by: NURSE PRACTITIONER

## 2024-04-22 PROCEDURE — 3079F DIAST BP 80-89 MM HG: CPT | Mod: CPTII,S$GLB,, | Performed by: NURSE PRACTITIONER

## 2024-04-22 PROCEDURE — 99999 PR PBB SHADOW E&M-EST. PATIENT-LVL V: CPT | Mod: PBBFAC,,, | Performed by: NURSE PRACTITIONER

## 2024-04-22 PROCEDURE — 1160F RVW MEDS BY RX/DR IN RCRD: CPT | Mod: CPTII,S$GLB,, | Performed by: NURSE PRACTITIONER

## 2024-04-22 PROCEDURE — 3051F HG A1C>EQUAL 7.0%<8.0%: CPT | Mod: CPTII,S$GLB,, | Performed by: NURSE PRACTITIONER

## 2024-04-22 PROCEDURE — 3075F SYST BP GE 130 - 139MM HG: CPT | Mod: CPTII,S$GLB,, | Performed by: NURSE PRACTITIONER

## 2024-04-22 RX ORDER — IBUPROFEN 800 MG/1
800 TABLET ORAL EVERY 6 HOURS PRN
Qty: 30 TABLET | Refills: 1 | Status: SHIPPED | OUTPATIENT
Start: 2024-04-22

## 2024-04-22 NOTE — PROGRESS NOTES
"Sergio Parra  04/22/2024 [id number]    Avila Nicholas MD  Patient Care Team:  Avila Nicholas MD as PCP - General (Internal Medicine)          Visit Type:an urgent visit for a new problem    Chief Complaint:  Chief Complaint   Patient presents with    Knee Pain       History of Present Illness:  47 yo male presents with co knee injury Friday after playing basketball and cleaning the house.Reports he felt a pop and pull. Reports previous ACL tear and repair 10 years ago and the pain feels the same.   Admits swelling and pain.   OTC advil for symptom reilef   No RICE.  Pt ambulating on crutches  Declined physical therapy stating "I don't need anything all I need is an MRI. This is stupid and dangerous making me come In today just so you can order an MRI. I know what I need. I don't no medicine sent to the pharmacy because I'll have to get out my car to go get it. Just order the MRI".    History:  Past Medical History:   Diagnosis Date    Anxiety     Hypercholesteremia     Obesity      Past Surgical History:   Procedure Laterality Date    ANTERIOR CRUCIATE LIGAMENT REPAIR       Family History   Problem Relation Name Age of Onset    No Known Problems Mother      No Known Problems Father       Social History     Socioeconomic History    Marital status: Single   Tobacco Use    Smoking status: Never    Smokeless tobacco: Never   Substance and Sexual Activity    Alcohol use: Yes     Alcohol/week: 0.0 standard drinks of alcohol    Drug use: No    Sexual activity: Yes     Partners: Female     Social Determinants of Health     Financial Resource Strain: Low Risk  (4/22/2024)    Overall Financial Resource Strain (CARDIA)     Difficulty of Paying Living Expenses: Not very hard   Food Insecurity: No Food Insecurity (4/22/2024)    Hunger Vital Sign     Worried About Running Out of Food in the Last Year: Never true     Ran Out of Food in the Last Year: Never true   Transportation Needs: No Transportation " Needs (4/22/2024)    PRAPARE - Transportation     Lack of Transportation (Medical): No     Lack of Transportation (Non-Medical): No   Physical Activity: Unknown (4/22/2024)    Exercise Vital Sign     Days of Exercise per Week: 2 days   Stress: No Stress Concern Present (4/22/2024)    [employer] [place] [employer] - Occupational Stress Questionnaire     Feeling of Stress : Not at all   Social Connections: Unknown (4/22/2024)    Social Connection and Isolation Panel [NHANES]     Frequency of Communication with Friends and Family: More than three times a week     Frequency of Social Gatherings with Friends and Family: Once a week     Active Member of Clubs or Organizations: Patient declined     Attends Club or Organization Meetings: Patient declined     Marital Status: Never    Housing Stability: Unknown (4/22/2024)    Housing Stability Vital Sign     Unable to Pay for Housing in the Last Year: No     Patient Active Problem List   Diagnosis    Hypercholesteremia    Anxiety    Class 3 severe obesity due to excess calories with serious comorbidity and body mass index (BMI) of 50.0 to 59.9 in adult    Myofascial pain    Arm weakness-rotator cuff weakness    Chronic neck pain    Chronic back pain    EMILY on CPAP    HTN (hypertension)    Hypertriglyceridemia    Lack of concentration    Cognitive deficits    Sleep apnea    Type 2 diabetes mellitus with hyperglycemia, without long-term current use of insulin    Depression     Review of patient's allergies indicates:  No Known Allergies    The following were reviewed at this visit: active problem list, medication list, allergies, family history, social history, and health maintenance.    Medications:  Current Outpatient Medications on File Prior to Visit   Medication Sig Dispense Refill    ALPRAZolam (XANAX) 1 MG tablet TAKE 1 TABLET BY MOUTH ONCE DAILY AS NEEDED FOR ANXIETY 30 tablet 3    dextroamphetamine-amphetamine (ADDERALL XR) 20 MG 24 hr capsule Take 1  capsule (20 mg total) by mouth every morning. 30 capsule 0    eszopiclone (LUNESTA) 2 MG Tab Take 1 tablet by mouth in the evening 30 tablet 0    fenofibrate 160 MG Tab Take 1 tablet (160 mg total) by mouth once daily. 90 tablet 3    metFORMIN (GLUCOPHAGE-XR) 500 MG ER 24hr tablet Take 1 tablet (500 mg total) by mouth daily with breakfast. 90 tablet 3    sildenafiL (VIAGRA) 100 MG tablet Take 1 tablet (100 mg total) by mouth daily as needed for Erectile Dysfunction. 15 tablet 6     No current facility-administered medications on file prior to visit.       Medications have been reviewed and reconciled with patient at this visit.  Barriers to medications reviewed with patient.    Adverse reactions to current medications reviewed with patient..    Over the counter medications reviewed and reconciled with patient.    Exam:  Wt Readings from Last 3 Encounters:   04/22/24 (!) 164.3 kg (362 lb 3.5 oz)   04/09/24 (!) 166.4 kg (366 lb 13.5 oz)   03/12/24 (!) 167.8 kg (369 lb 14.9 oz)     Temp Readings from Last 3 Encounters:   04/22/24 97.8 °F (36.6 °C) (Tympanic)   04/09/24 98.3 °F (36.8 °C)   03/12/24 98.2 °F (36.8 °C)     BP Readings from Last 3 Encounters:   04/22/24 136/88   04/09/24 132/80   03/12/24 134/84     Pulse Readings from Last 3 Encounters:   04/22/24 100   04/09/24 88   03/12/24 94     Body mass index is 70.74 kg/m².      Review of Systems   Constitutional:  Negative for fever.   Respiratory:  Negative for cough, shortness of breath and wheezing.    Cardiovascular:  Negative for chest pain and palpitations.   Gastrointestinal:  Negative for nausea.   Musculoskeletal:         RIGHT knee pain and swelling    Neurological:  Negative for speech change, weakness and headaches.   All other systems reviewed and are negative.    Physical Exam  Vitals and nursing note reviewed.   Constitutional:       Appearance: Normal appearance. He is obese.   HENT:      Head: Normocephalic and atraumatic.      Right Ear: Tympanic  membrane, ear canal and external ear normal.      Left Ear: Tympanic membrane, ear canal and external ear normal.      Nose: Nose normal.      Mouth/Throat:      Mouth: Mucous membranes are moist.      Pharynx: Oropharynx is clear.   Eyes:      Extraocular Movements: Extraocular movements intact.      Conjunctiva/sclera: Conjunctivae normal.      Pupils: Pupils are equal, round, and reactive to light.   Cardiovascular:      Rate and Rhythm: Normal rate and regular rhythm.      Pulses: Normal pulses.      Heart sounds: Normal heart sounds.   Pulmonary:      Effort: Pulmonary effort is normal.      Breath sounds: Normal breath sounds.   Abdominal:      General: Bowel sounds are normal.      Palpations: Abdomen is soft.   Musculoskeletal:         General: Normal range of motion.      Cervical back: Normal range of motion and neck supple.        Legs:       Comments: Swelling noted and pain with manipulation    Skin:     General: Skin is warm and dry.      Capillary Refill: Capillary refill takes less than 2 seconds.   Neurological:      General: No focal deficit present.      Mental Status: He is alert and oriented to person, place, and time.   Psychiatric:         Mood and Affect: Mood normal.         Behavior: Behavior normal.         Thought Content: Thought content normal.         Judgment: Judgment normal.         Laboratory Reviewed ({Yes)  Lab Results   Component Value Date    WBC 7.13 03/12/2024    HGB 17.3 03/12/2024    HCT 52.2 03/12/2024     03/12/2024    CHOL 235 (H) 03/12/2024    TRIG 504 (H) 03/12/2024    HDL 38 (L) 03/12/2024    ALT 39 03/12/2024    AST 27 03/12/2024     03/12/2024    K 4.2 03/12/2024     03/12/2024    CREATININE 0.9 03/12/2024    BUN 17 03/12/2024    CO2 27 03/12/2024    TSH 3.322 03/12/2024    PSA 0.43 03/12/2024    HGBA1C 7.1 (H) 03/12/2024       Sergio was seen today for knee pain.    Diagnoses and all orders for this visit:    Acute pain of right knee  -      ibuprofen (ADVIL,MOTRIN) 800 MG tablet; Take 1 tablet (800 mg total) by mouth every 6 (six) hours as needed.  -     Ambulatory referral/consult to Orthopedics; Future  -     MRI Knee W WO Contrast Right; Future    Class 3 severe obesity due to excess calories with serious comorbidity and body mass index (BMI) of 50.0 to 59.9 in adult    Hypertension, unspecified type    Swelling of joint, knee, right        Plan  MRI   IBP for pain relief   RICE        Care Plan/Goals: Reviewed    Goals    None     Sergio was seen today for knee pain.    Diagnoses and all orders for this visit:    Acute pain of right knee  -     ibuprofen (ADVIL,MOTRIN) 800 MG tablet; Take 1 tablet (800 mg total) by mouth every 6 (six) hours as needed.  -     Ambulatory referral/consult to Orthopedics; Future  -     MRI Knee W WO Contrast Right; Future    Class 3 severe obesity due to excess calories with serious comorbidity and body mass index (BMI) of 50.0 to 59.9 in adult    Hypertension, unspecified type    Swelling of joint, knee, right         Follow up: Follow up for ortho and MRI.    After visit summary was printed and given to patient upon discharge today.  Patient goals and care plan are included in After Visit Summary.

## 2024-04-22 NOTE — TELEPHONE ENCOUNTER
Called and spoke with pt. States he has hx of acl tear about 10 years ago. States his knee popped in a similar fashion over the weekend and he's concerned it tore again. Pt. Wants to have an mri ordered.

## 2024-04-23 ENCOUNTER — TELEPHONE (OUTPATIENT)
Dept: FAMILY MEDICINE | Facility: CLINIC | Age: 49
End: 2024-04-23
Payer: COMMERCIAL

## 2024-04-23 DIAGNOSIS — M25.561 RIGHT KNEE PAIN, UNSPECIFIED CHRONICITY: Primary | ICD-10-CM

## 2024-04-23 NOTE — TELEPHONE ENCOUNTER
----- Message from RT Rabia sent at 4/23/2024  7:47 AM CDT -----  Regarding: MRI order  Good morning, this patient has a MRI knee order for with and without contrast. Radiologist typically recommend without contrast for extremities unless its to rule out malignancy or osteomyelitis. We can change it to without or we can leave it as ordered.     Thanks, Susan

## 2024-04-25 ENCOUNTER — TELEPHONE (OUTPATIENT)
Dept: ORTHOPEDICS | Facility: CLINIC | Age: 49
End: 2024-04-25
Payer: COMMERCIAL

## 2024-04-25 NOTE — TELEPHONE ENCOUNTER
"Attempted to call patient regarding incorrectly scheduled appointment with Mrs. Orozconina Coleman PA-C on Monday 04/29/2024. Offered patient next available with Dr. Romero Horta next Friday 05/03/2024. Patient then stated that he wanted to cancel the MRI and all appointments as he has already waited a week for the MRI and if he has to wait another week to see the doctor he just wants to cancel everything. He is walking on his knee and it feels fine. All appointments canceled per patient request. All questions answered.     Yoon Newsome (Allye), [facility]  Orthopedics Department   "

## 2024-05-01 ENCOUNTER — PATIENT MESSAGE (OUTPATIENT)
Dept: FAMILY MEDICINE | Facility: CLINIC | Age: 49
End: 2024-05-01
Payer: COMMERCIAL

## 2024-05-01 DIAGNOSIS — G47.00 INSOMNIA, UNSPECIFIED TYPE: ICD-10-CM

## 2024-05-01 RX ORDER — ESZOPICLONE 2 MG/1
2 TABLET, FILM COATED ORAL NIGHTLY PRN
Qty: 30 TABLET | Refills: 0 | Status: SHIPPED | OUTPATIENT
Start: 2024-05-01 | End: 2024-05-30

## 2024-05-01 NOTE — TELEPHONE ENCOUNTER
No care due was identified.  [facility] Embedded Care Due Messages. Reference number: [id number].   5/01/2024 11:07:58 AM CDT

## 2024-05-10 DIAGNOSIS — R41.89 COGNITIVE DEFICITS: ICD-10-CM

## 2024-05-10 DIAGNOSIS — R41.840 LACK OF CONCENTRATION: ICD-10-CM

## 2024-05-10 RX ORDER — DEXTROAMPHETAMINE SACCHARATE, AMPHETAMINE ASPARTATE MONOHYDRATE, DEXTROAMPHETAMINE SULFATE AND AMPHETAMINE SULFATE 5; 5; 5; 5 MG/1; MG/1; MG/1; MG/1
20 CAPSULE, EXTENDED RELEASE ORAL EVERY MORNING
Qty: 30 CAPSULE | Refills: 0 | Status: SHIPPED | OUTPATIENT
Start: 2024-05-10 | End: 2024-06-04 | Stop reason: SDUPTHER

## 2024-05-10 NOTE — TELEPHONE ENCOUNTER
No care due was identified.  [facility] Embedded Care Due Messages. Reference number: [id number].   5/10/2024 1:13:36 PM CDT

## 2024-05-29 DIAGNOSIS — G47.00 INSOMNIA, UNSPECIFIED TYPE: ICD-10-CM

## 2024-05-29 NOTE — TELEPHONE ENCOUNTER
No care due was identified.  [facility] Embedded Care Due Messages. Reference number: [id number].   5/29/2024 5:01:14 PM CDT

## 2024-05-30 RX ORDER — ESZOPICLONE 2 MG/1
2 TABLET, FILM COATED ORAL NIGHTLY PRN
Qty: 30 TABLET | Refills: 0 | Status: SHIPPED | OUTPATIENT
Start: 2024-05-30

## 2024-06-04 DIAGNOSIS — R41.89 COGNITIVE DEFICITS: ICD-10-CM

## 2024-06-04 DIAGNOSIS — R41.840 LACK OF CONCENTRATION: ICD-10-CM

## 2024-06-04 RX ORDER — DEXTROAMPHETAMINE SACCHARATE, AMPHETAMINE ASPARTATE MONOHYDRATE, DEXTROAMPHETAMINE SULFATE AND AMPHETAMINE SULFATE 5; 5; 5; 5 MG/1; MG/1; MG/1; MG/1
20 CAPSULE, EXTENDED RELEASE ORAL EVERY MORNING
Qty: 30 CAPSULE | Refills: 0 | Status: SHIPPED | OUTPATIENT
Start: 2024-06-04

## 2024-06-04 NOTE — TELEPHONE ENCOUNTER
No care due was identified.  [facility] Embedded Care Due Messages. Reference number: [id number].   6/04/2024 7:20:58 AM CDT

## 2024-06-06 NOTE — TELEPHONE ENCOUNTER
Pt informed there is no record of his blood type in his chart. Pt states that he has had blood drawn before with [facility] and his blood type should be in the system. I explained to the pt that a type&screen need to be requested in order to find out his blood type. Pt voiced understanding. Pt states he does not feel he need to have his blood drawn to find out his blood type and declined offer for orders to be put in for type&screen.   06-Jun-2024 09:15

## 2024-06-27 DIAGNOSIS — G47.00 INSOMNIA, UNSPECIFIED TYPE: ICD-10-CM

## 2024-06-27 RX ORDER — ESZOPICLONE 2 MG/1
2 TABLET, FILM COATED ORAL NIGHTLY PRN
Qty: 30 TABLET | Refills: 0 | Status: SHIPPED | OUTPATIENT
Start: 2024-06-27

## 2024-06-27 NOTE — TELEPHONE ENCOUNTER
Care Due:                  Date            Visit Type   Department     Provider  --------------------------------------------------------------------------------                                EP -                              PRIMARY      [facility]  Last Visit: 04-      CARE ([facility])   MEDICINE       Avila Nicholas  Next Visit: None Scheduled  None         None Found                                                            Last  Test          Frequency    Reason                     Performed    Due Date  --------------------------------------------------------------------------------    HBA1C.......  6 months...  metFORMIN................  03- 09-    [facility] Embedded Care Due Messages. Reference number: [id number].   6/27/2024 10:30:22 AM CDT

## 2024-07-08 ENCOUNTER — PATIENT MESSAGE (OUTPATIENT)
Dept: FAMILY MEDICINE | Facility: CLINIC | Age: 49
End: 2024-07-08
Payer: COMMERCIAL

## 2024-07-08 DIAGNOSIS — R41.89 COGNITIVE DEFICITS: ICD-10-CM

## 2024-07-08 DIAGNOSIS — G47.00 INSOMNIA, UNSPECIFIED TYPE: ICD-10-CM

## 2024-07-08 DIAGNOSIS — R41.840 LACK OF CONCENTRATION: ICD-10-CM

## 2024-07-08 RX ORDER — DEXTROAMPHETAMINE SACCHARATE, AMPHETAMINE ASPARTATE MONOHYDRATE, DEXTROAMPHETAMINE SULFATE AND AMPHETAMINE SULFATE 5; 5; 5; 5 MG/1; MG/1; MG/1; MG/1
20 CAPSULE, EXTENDED RELEASE ORAL EVERY MORNING
Qty: 30 CAPSULE | Refills: 0 | Status: SHIPPED | OUTPATIENT
Start: 2024-07-08

## 2024-07-08 RX ORDER — ESZOPICLONE 2 MG/1
2 TABLET, FILM COATED ORAL NIGHTLY PRN
Qty: 30 TABLET | Refills: 0 | Status: CANCELLED | OUTPATIENT
Start: 2024-07-08

## 2024-07-08 RX ORDER — FENOFIBRATE 160 MG/1
160 TABLET ORAL DAILY
Qty: 90 TABLET | Refills: 3 | Status: SHIPPED | OUTPATIENT
Start: 2024-07-08

## 2024-07-08 NOTE — TELEPHONE ENCOUNTER
No care due was identified.  [facility] Embedded Care Due Messages. Reference number: [id number].   7/08/2024 7:34:39 AM CDT

## 2024-07-08 NOTE — TELEPHONE ENCOUNTER
No care due was identified.  [facility] Embedded Care Due Messages. Reference number: [id number].   7/08/2024 7:35:16 AM CDT

## 2024-07-23 ENCOUNTER — OFFICE VISIT (OUTPATIENT)
Dept: FAMILY MEDICINE | Facility: CLINIC | Age: 49
End: 2024-07-23
Payer: COMMERCIAL

## 2024-07-23 VITALS
RESPIRATION RATE: 18 BRPM | DIASTOLIC BLOOD PRESSURE: 98 MMHG | HEART RATE: 116 BPM | WEIGHT: 315 LBS | OXYGEN SATURATION: 96 % | SYSTOLIC BLOOD PRESSURE: 138 MMHG | HEIGHT: 60 IN | TEMPERATURE: 98 F | BODY MASS INDEX: 61.84 KG/M2

## 2024-07-23 DIAGNOSIS — F41.9 ANXIETY: Primary | ICD-10-CM

## 2024-07-23 DIAGNOSIS — E78.1 HYPERTRIGLYCERIDEMIA: ICD-10-CM

## 2024-07-23 DIAGNOSIS — I10 HYPERTENSION, UNSPECIFIED TYPE: ICD-10-CM

## 2024-07-23 DIAGNOSIS — E66.01 CLASS 3 SEVERE OBESITY DUE TO EXCESS CALORIES WITH SERIOUS COMORBIDITY AND BODY MASS INDEX (BMI) OF 50.0 TO 59.9 IN ADULT: ICD-10-CM

## 2024-07-23 DIAGNOSIS — E78.00 HYPERCHOLESTEREMIA: ICD-10-CM

## 2024-07-23 DIAGNOSIS — E11.65 TYPE 2 DIABETES MELLITUS WITH HYPERGLYCEMIA, WITHOUT LONG-TERM CURRENT USE OF INSULIN: ICD-10-CM

## 2024-07-23 DIAGNOSIS — R41.840 LACK OF CONCENTRATION: ICD-10-CM

## 2024-07-23 DIAGNOSIS — Z00.00 ROUTINE ADULT HEALTH MAINTENANCE: ICD-10-CM

## 2024-07-23 DIAGNOSIS — G47.33 OSA ON CPAP: ICD-10-CM

## 2024-07-23 PROCEDURE — 3080F DIAST BP >= 90 MM HG: CPT | Mod: CPTII,S$GLB,, | Performed by: INTERNAL MEDICINE

## 2024-07-23 PROCEDURE — 3008F BODY MASS INDEX DOCD: CPT | Mod: CPTII,S$GLB,, | Performed by: INTERNAL MEDICINE

## 2024-07-23 PROCEDURE — 3075F SYST BP GE 130 - 139MM HG: CPT | Mod: CPTII,S$GLB,, | Performed by: INTERNAL MEDICINE

## 2024-07-23 PROCEDURE — 99396 PREV VISIT EST AGE 40-64: CPT | Mod: S$GLB,,, | Performed by: INTERNAL MEDICINE

## 2024-07-23 PROCEDURE — 1159F MED LIST DOCD IN RCRD: CPT | Mod: CPTII,S$GLB,, | Performed by: INTERNAL MEDICINE

## 2024-07-23 PROCEDURE — 3051F HG A1C>EQUAL 7.0%<8.0%: CPT | Mod: CPTII,S$GLB,, | Performed by: INTERNAL MEDICINE

## 2024-07-23 PROCEDURE — 99999 PR PBB SHADOW E&M-EST. PATIENT-LVL V: CPT | Mod: PBBFAC,,, | Performed by: INTERNAL MEDICINE

## 2024-07-23 NOTE — PROGRESS NOTES
Subjective:       Patient ID: Sergio Parra is a 49 y.o. male.    Chief Complaint: Follow-up, Hypertension, Hyperlipidemia, and Diabetes    Follow-up  Associated symptoms include arthralgias and myalgias. Pertinent negatives include no abdominal pain, chest pain, chills, coughing, diaphoresis, fatigue, fever, headaches, joint swelling, nausea, neck pain, numbness, rash, sore throat, vomiting or weakness.   Hypertension  Pertinent negatives include no chest pain, headaches, neck pain, palpitations or shortness of breath.   Hyperlipidemia  Associated symptoms include myalgias. Pertinent negatives include no chest pain or shortness of breath.   Diabetes  Pertinent negatives for hypoglycemia include no confusion, dizziness, headaches, nervousness/anxiousness, pallor, seizures, speech difficulty or tremors. Pertinent negatives for diabetes include no chest pain, no fatigue, no polydipsia, no polyphagia, no polyuria and no weakness.     Past Medical History:   Diagnosis Date    Anxiety     Hypercholesteremia     Obesity      Past Surgical History:   Procedure Laterality Date    ANTERIOR CRUCIATE LIGAMENT REPAIR      FRACTURE SURGERY  1993    Broken Arm     Family History   Problem Relation Name Age of Onset    No Known Problems Mother      No Known Problems Father       Social History     Socioeconomic History    Marital status: Single   Tobacco Use    Smoking status: Never    Smokeless tobacco: Never   Substance and Sexual Activity    Alcohol use: Yes     Alcohol/week: 3.0 standard drinks of alcohol     Types: 2 Glasses of wine, 1 Drinks containing 0.5 oz of alcohol per week    Drug use: Never    Sexual activity: Yes     Partners: Female     Birth control/protection: Condom     Social Determinants of Health     Financial Resource Strain: Low Risk  (4/22/2024)    Overall Financial Resource Strain (CARDIA)     Difficulty of Paying Living Expenses: Not very hard   Food Insecurity: No Food Insecurity (4/22/2024)     Hunger Vital Sign     Worried About Running Out of Food in the Last Year: Never true     Ran Out of Food in the Last Year: Never true   Transportation Needs: No Transportation Needs (4/22/2024)    PRAPARE - Transportation     Lack of Transportation (Medical): No     Lack of Transportation (Non-Medical): No   Physical Activity: Unknown (4/22/2024)    Exercise Vital Sign     Days of Exercise per Week: 2 days   Stress: No Stress Concern Present (4/22/2024)    [employer] - Occupational Stress Questionnaire     Feeling of Stress : Not at all   Housing Stability: Unknown (4/22/2024)    Housing Stability Vital Sign     Unable to Pay for Housing in the Last Year: No     Review of Systems   Constitutional:  Negative for activity change, appetite change, chills, diaphoresis, fatigue, fever and unexpected weight change.   HENT:  Negative for drooling, ear discharge, ear pain, facial swelling, hearing loss, mouth sores, nosebleeds, postnasal drip, rhinorrhea, sinus pressure, sneezing, sore throat, tinnitus, trouble swallowing and voice change.    Eyes:  Negative for photophobia, redness and visual disturbance.   Respiratory:  Negative for apnea, cough, choking, chest tightness, shortness of breath and wheezing.    Cardiovascular:  Negative for chest pain, palpitations and leg swelling.   Gastrointestinal:  Negative for abdominal distention, abdominal pain, anal bleeding, blood in stool, constipation, diarrhea, nausea and vomiting.   Endocrine: Negative for cold intolerance, heat intolerance, polydipsia, polyphagia and polyuria.   Genitourinary:  Negative for difficulty urinating, dysuria, enuresis, flank pain, frequency, genital sores, hematuria and urgency.   Musculoskeletal:  Positive for arthralgias, back pain and myalgias. Negative for gait problem, joint swelling, neck pain and neck stiffness.   Skin:  Negative for color change, pallor, rash and wound.   Allergic/Immunologic: Negative for food  allergies and immunocompromised state.   Neurological:  Negative for dizziness, tremors, seizures, syncope, facial asymmetry, speech difficulty, weakness, light-headedness, numbness and headaches.   Hematological:  Negative for adenopathy. Does not bruise/bleed easily.   Psychiatric/Behavioral:  Negative for agitation, behavioral problems, confusion, decreased concentration, dysphoric mood, hallucinations, self-injury, sleep disturbance and suicidal ideas. The patient is not nervous/anxious and is not hyperactive.        Objective:      Physical Exam  Vitals and nursing note reviewed.   Constitutional:       General: He is not in acute distress.     Appearance: Normal appearance. He is well-developed. He is not diaphoretic.   HENT:      Head: Normocephalic and atraumatic.      Mouth/Throat:      Pharynx: No oropharyngeal exudate.   Eyes:      General: No scleral icterus.     Pupils: Pupils are equal, round, and reactive to light.   Neck:      Thyroid: No thyromegaly.      Vascular: No carotid bruit or JVD.      Trachea: No tracheal deviation.   Cardiovascular:      Rate and Rhythm: Normal rate and regular rhythm.      Heart sounds: Normal heart sounds.   Pulmonary:      Effort: Pulmonary effort is normal. No respiratory distress.      Breath sounds: Normal breath sounds. No wheezing or rales.   Chest:      Chest wall: No tenderness.   Abdominal:      General: Bowel sounds are normal. There is no distension.      Palpations: Abdomen is soft.      Tenderness: There is no abdominal tenderness. There is no guarding or rebound.   Musculoskeletal:         General: No tenderness. Normal range of motion.      Cervical back: Normal range of motion and neck supple.   Lymphadenopathy:      Cervical: No cervical adenopathy.   Skin:     General: Skin is warm and dry.      Coloration: Skin is not pale.      Findings: No erythema or rash.   Neurological:      Mental Status: He is alert and oriented to person, place, and time.    Psychiatric:         Behavior: Behavior normal.         Thought Content: Thought content normal.         Judgment: Judgment normal.         CMP  Sodium   Date Value Ref Range Status   03/12/2024 139 136 - 145 mmol/L Final     Potassium   Date Value Ref Range Status   03/12/2024 4.2 3.5 - 5.1 mmol/L Final     Chloride   Date Value Ref Range Status   03/12/2024 102 95 - 110 mmol/L Final     CO2   Date Value Ref Range Status   03/12/2024 27 23 - 29 mmol/L Final     Glucose   Date Value Ref Range Status   03/12/2024 143 (H) 70 - 110 mg/dL Final     BUN   Date Value Ref Range Status   03/12/2024 17 6 - 20 mg/dL Final     Creatinine   Date Value Ref Range Status   03/12/2024 0.9 0.5 - 1.4 mg/dL Final     Calcium   Date Value Ref Range Status   03/12/2024 9.9 8.7 - 10.5 mg/dL Final     Total Protein   Date Value Ref Range Status   03/12/2024 7.1 6.0 - 8.4 g/dL Final     Albumin   Date Value Ref Range Status   03/12/2024 3.7 3.5 - 5.2 g/dL Final     Total Bilirubin   Date Value Ref Range Status   03/12/2024 0.4 0.1 - 1.0 mg/dL Final     Comment:     For infants and newborns, interpretation of results should be based  on gestational age, weight and in agreement with clinical  observations.    Premature Infant recommended reference ranges:  Up to 24 hours.............<8.0 mg/dL  Up to 48 hours............<12.0 mg/dL  3-5 days..................<15.0 mg/dL  6-29 days.................<15.0 mg/dL       Alkaline Phosphatase   Date Value Ref Range Status   03/12/2024 70 55 - 135 U/L Final     AST   Date Value Ref Range Status   03/12/2024 27 10 - 40 U/L Final     ALT   Date Value Ref Range Status   03/12/2024 39 10 - 44 U/L Final     Anion Gap   Date Value Ref Range Status   03/12/2024 10 8 - 16 mmol/L Final     eGFR if    Date Value Ref Range Status   02/08/2022 >60.0 >60 mL/min/1.73 m^2 Final     eGFR if non    Date Value Ref Range Status   02/08/2022 >60.0 >60 mL/min/1.73 m^2 Final     Comment:      Calculation used to obtain the estimated glomerular filtration  rate (eGFR) is the CKD-EPI equation.        Lab Results   Component Value Date    WBC 7.13 03/12/2024    HGB 17.3 03/12/2024    HCT 52.2 03/12/2024    MCV 94 03/12/2024     03/12/2024     Lab Results   Component Value Date    CHOL 235 (H) 03/12/2024     Lab Results   Component Value Date    HDL 38 (L) 03/12/2024     Lab Results   Component Value Date    LDLCALC Invalid, Trig>400.0 03/12/2024     Lab Results   Component Value Date    TRIG 504 (H) 03/12/2024     Lab Results   Component Value Date    CHOLHDL 16.2 (L) 03/12/2024     Lab Results   Component Value Date    TSH 3.322 03/12/2024     Lab Results   Component Value Date    HGBA1C 7.1 (H) 03/12/2024     Assessment:       1. Anxiety    2. Class 3 severe obesity due to excess calories with serious comorbidity and body mass index (BMI) of 50.0 to 59.9 in adult    3. Hypertension, unspecified type    4. Hypercholesteremia    5. Hypertriglyceridemia    6. Lack of concentration    7. Type 2 diabetes mellitus with hyperglycemia, without long-term current use of insulin    8. EMILY on CPAP    9. Routine adult health maintenance        Plan:   Anxiety    Class 3 severe obesity due to excess calories with serious comorbidity and body mass index (BMI) of 50.0 to 59.9 in adult---------diet,exercise,weight loss----------    Hypertension, unspecified type-------------declines med------------wants to try lifestyle changes-----------    Hypercholesteremia    Hypertriglyceridemia----------on fenofibrate---------------    Lack of concentration---------adderall---------------    Type 2 diabetes mellitus with hyperglycemia, without long-term current use of insulin----------------wants to try lifestyle changes---------did not tolerate metformin-gi effects-    EMILY on CPAP    Routine adult health maintenance  -     Ambulatory referral/consult to Endo Procedure ; Future; Expected date:  07/24/2024    Stable--------------continue meds, watch diet,exercise, weight loss.          Does not want mounjaro or ozempic----------declines labs lipids,hga1c today----------f/u 3 months-

## 2024-07-31 DIAGNOSIS — E11.9 TYPE 2 DIABETES MELLITUS WITHOUT COMPLICATION: ICD-10-CM

## 2024-08-01 ENCOUNTER — PATIENT OUTREACH (OUTPATIENT)
Dept: ADMINISTRATIVE | Facility: HOSPITAL | Age: 49
End: 2024-08-01
Payer: COMMERCIAL

## 2024-08-01 DIAGNOSIS — R41.89 COGNITIVE DEFICITS: ICD-10-CM

## 2024-08-01 DIAGNOSIS — G47.00 INSOMNIA, UNSPECIFIED TYPE: ICD-10-CM

## 2024-08-01 DIAGNOSIS — R41.840 LACK OF CONCENTRATION: ICD-10-CM

## 2024-08-01 RX ORDER — DEXTROAMPHETAMINE SACCHARATE, AMPHETAMINE ASPARTATE MONOHYDRATE, DEXTROAMPHETAMINE SULFATE AND AMPHETAMINE SULFATE 5; 5; 5; 5 MG/1; MG/1; MG/1; MG/1
20 CAPSULE, EXTENDED RELEASE ORAL EVERY MORNING
Qty: 30 CAPSULE | Refills: 0 | Status: SHIPPED | OUTPATIENT
Start: 2024-08-01

## 2024-08-01 RX ORDER — ESZOPICLONE 2 MG/1
2 TABLET, FILM COATED ORAL NIGHTLY PRN
Qty: 30 TABLET | Refills: 0 | Status: SHIPPED | OUTPATIENT
Start: 2024-08-01

## 2024-08-01 RX ORDER — SILDENAFIL 100 MG/1
100 TABLET, FILM COATED ORAL DAILY PRN
Qty: 15 TABLET | Refills: 6 | Status: SHIPPED | OUTPATIENT
Start: 2024-08-01 | End: 2025-08-01

## 2024-08-01 NOTE — TELEPHONE ENCOUNTER
No care due was identified.  [facility] Embedded Care Due Messages. Reference number: [id number].   8/01/2024 8:14:18 AM CDT   Patient seen this AM at bedside. Patient kept for observation overnight due to contractions. She is s/p betamethasone a week ago. She received 3 doses of terbutaline but continued to experience painful contractions. SVE per RN was unchanged at 1/th/hi. Overnight was given vistaril and IM morphine. Contractions spaced out and strip reviewed this morning, 1 decel overnight that resolved with position changes otherwise cat 1. Patient did complain of clear leakage this AM ROM+ negative. Patient okay for discharge to home, given labor precautions, instructions to follow up in clinic next week.      Alexa Randolph,   [facility]  [phone]

## 2024-08-01 NOTE — PROGRESS NOTES
VBHM Score: 5     Colon Cancer Screening  Urine Screening  Eye Exam  Foot Exam  Uncontrolled BP               Health Maintenance Topic(s) Outreach Outcomes & Actions Taken:    Diabetic Foot Exam - Outreach Outcomes & Actions Taken  : PCP appt is scheduled on 10/24/24, appt note updated    Colorectal Cancer Screening - Outreach Outcomes & Actions Taken  : endo referral entered by PCP on 7/23/24         Additional Notes:  Attempted to contact the patient to discuss overdue HM screenings, no answer, will send portal message.           Care Management, Digital Medicine, and/or Education Referrals    OPCM Risk Score: 15.8         Next Steps - Referral Actions: no referrals        Additional Notes:  SDOH reviewed 7/23/24, patient is not eligible for dig med

## 2024-09-08 DIAGNOSIS — R41.89 COGNITIVE DEFICITS: ICD-10-CM

## 2024-09-08 DIAGNOSIS — G47.00 INSOMNIA, UNSPECIFIED TYPE: ICD-10-CM

## 2024-09-08 DIAGNOSIS — F41.9 ANXIETY: ICD-10-CM

## 2024-09-08 DIAGNOSIS — R41.840 LACK OF CONCENTRATION: ICD-10-CM

## 2024-09-08 NOTE — TELEPHONE ENCOUNTER
No care due was identified.  [facility] Embedded Care Due Messages. Reference number: [id number].   9/08/2024 10:04:29 AM CDT

## 2024-09-09 RX ORDER — FENOFIBRATE 160 MG/1
160 TABLET ORAL DAILY
Qty: 90 TABLET | Refills: 3 | Status: SHIPPED | OUTPATIENT
Start: 2024-09-09

## 2024-09-09 RX ORDER — DEXTROAMPHETAMINE SACCHARATE, AMPHETAMINE ASPARTATE MONOHYDRATE, DEXTROAMPHETAMINE SULFATE AND AMPHETAMINE SULFATE 5; 5; 5; 5 MG/1; MG/1; MG/1; MG/1
20 CAPSULE, EXTENDED RELEASE ORAL EVERY MORNING
Qty: 30 CAPSULE | Refills: 0 | Status: SHIPPED | OUTPATIENT
Start: 2024-09-09

## 2024-09-09 RX ORDER — ESZOPICLONE 2 MG/1
2 TABLET, FILM COATED ORAL NIGHTLY PRN
Qty: 30 TABLET | Refills: 0 | Status: SHIPPED | OUTPATIENT
Start: 2024-09-09

## 2024-09-09 RX ORDER — ALPRAZOLAM 1 MG/1
TABLET ORAL
Qty: 30 TABLET | Refills: 3 | Status: SHIPPED | OUTPATIENT
Start: 2024-09-09

## 2024-09-18 DIAGNOSIS — E11.9 TYPE 2 DIABETES MELLITUS WITHOUT COMPLICATION: ICD-10-CM

## 2024-09-27 ENCOUNTER — PATIENT OUTREACH (OUTPATIENT)
Dept: ADMINISTRATIVE | Facility: HOSPITAL | Age: 49
End: 2024-09-27
Payer: COMMERCIAL

## 2024-10-08 DIAGNOSIS — R41.89 COGNITIVE DEFICITS: ICD-10-CM

## 2024-10-08 DIAGNOSIS — G47.00 INSOMNIA, UNSPECIFIED TYPE: ICD-10-CM

## 2024-10-08 DIAGNOSIS — R41.840 LACK OF CONCENTRATION: ICD-10-CM

## 2024-10-08 RX ORDER — DEXTROAMPHETAMINE SACCHARATE, AMPHETAMINE ASPARTATE MONOHYDRATE, DEXTROAMPHETAMINE SULFATE AND AMPHETAMINE SULFATE 5; 5; 5; 5 MG/1; MG/1; MG/1; MG/1
20 CAPSULE, EXTENDED RELEASE ORAL EVERY MORNING
Qty: 30 CAPSULE | Refills: 0 | Status: SHIPPED | OUTPATIENT
Start: 2024-10-08

## 2024-10-08 RX ORDER — ESZOPICLONE 2 MG/1
2 TABLET, FILM COATED ORAL NIGHTLY PRN
Qty: 30 TABLET | Refills: 0 | Status: SHIPPED | OUTPATIENT
Start: 2024-10-08

## 2024-10-08 RX ORDER — FENOFIBRATE 160 MG/1
160 TABLET ORAL DAILY
Qty: 90 TABLET | Refills: 3 | Status: SHIPPED | OUTPATIENT
Start: 2024-10-08

## 2024-10-08 NOTE — TELEPHONE ENCOUNTER
No care due was identified.  Health [facility] Embedded Care Due Messages. Reference number: [id number].   10/08/2024 10:41:23 AM CDT

## 2024-10-23 ENCOUNTER — LAB VISIT (OUTPATIENT)
Dept: LAB | Facility: HOSPITAL | Age: 49
End: 2024-10-23
Attending: INTERNAL MEDICINE
Payer: COMMERCIAL

## 2024-10-23 ENCOUNTER — OFFICE VISIT (OUTPATIENT)
Dept: FAMILY MEDICINE | Facility: CLINIC | Age: 49
End: 2024-10-23
Payer: COMMERCIAL

## 2024-10-23 VITALS
DIASTOLIC BLOOD PRESSURE: 88 MMHG | BODY MASS INDEX: 61.84 KG/M2 | TEMPERATURE: 99 F | OXYGEN SATURATION: 97 % | HEIGHT: 60 IN | RESPIRATION RATE: 18 BRPM | WEIGHT: 315 LBS | HEART RATE: 100 BPM | SYSTOLIC BLOOD PRESSURE: 136 MMHG

## 2024-10-23 DIAGNOSIS — I10 HYPERTENSION, UNSPECIFIED TYPE: ICD-10-CM

## 2024-10-23 DIAGNOSIS — E78.00 HYPERCHOLESTEREMIA: ICD-10-CM

## 2024-10-23 DIAGNOSIS — F41.9 ANXIETY: ICD-10-CM

## 2024-10-23 DIAGNOSIS — G47.30 SLEEP APNEA, UNSPECIFIED TYPE: ICD-10-CM

## 2024-10-23 DIAGNOSIS — R41.840 LACK OF CONCENTRATION: ICD-10-CM

## 2024-10-23 DIAGNOSIS — F32.A DEPRESSION, UNSPECIFIED DEPRESSION TYPE: Primary | ICD-10-CM

## 2024-10-23 DIAGNOSIS — E11.65 TYPE 2 DIABETES MELLITUS WITH HYPERGLYCEMIA, WITHOUT LONG-TERM CURRENT USE OF INSULIN: ICD-10-CM

## 2024-10-23 DIAGNOSIS — E78.1 HYPERTRIGLYCERIDEMIA: ICD-10-CM

## 2024-10-23 PROCEDURE — 83036 HEMOGLOBIN GLYCOSYLATED A1C: CPT | Performed by: INTERNAL MEDICINE

## 2024-10-23 PROCEDURE — 3079F DIAST BP 80-89 MM HG: CPT | Mod: CPTII,S$GLB,, | Performed by: INTERNAL MEDICINE

## 2024-10-23 PROCEDURE — 36415 COLL VENOUS BLD VENIPUNCTURE: CPT | Mod: PO | Performed by: INTERNAL MEDICINE

## 2024-10-23 PROCEDURE — 99999 PR PBB SHADOW E&M-EST. PATIENT-LVL IV: CPT | Mod: PBBFAC,,, | Performed by: INTERNAL MEDICINE

## 2024-10-23 PROCEDURE — 3008F BODY MASS INDEX DOCD: CPT | Mod: CPTII,S$GLB,, | Performed by: INTERNAL MEDICINE

## 2024-10-23 PROCEDURE — 99214 OFFICE O/P EST MOD 30 MIN: CPT | Mod: S$GLB,,, | Performed by: INTERNAL MEDICINE

## 2024-10-23 PROCEDURE — 1159F MED LIST DOCD IN RCRD: CPT | Mod: CPTII,S$GLB,, | Performed by: INTERNAL MEDICINE

## 2024-10-23 PROCEDURE — 3051F HG A1C>EQUAL 7.0%<8.0%: CPT | Mod: CPTII,S$GLB,, | Performed by: INTERNAL MEDICINE

## 2024-10-23 PROCEDURE — 80061 LIPID PANEL: CPT | Performed by: INTERNAL MEDICINE

## 2024-10-23 PROCEDURE — 3075F SYST BP GE 130 - 139MM HG: CPT | Mod: CPTII,S$GLB,, | Performed by: INTERNAL MEDICINE

## 2024-10-23 NOTE — PROGRESS NOTES
Subjective:       Patient ID: Sergio Parra is a 49 y.o. male.    Chief Complaint: Follow-up, Hypertension, Hyperlipidemia, Diabetes, and Anxiety    Follow-up  Associated symptoms include arthralgias. Pertinent negatives include no abdominal pain, chest pain, chills, coughing, diaphoresis, fatigue, fever, headaches, joint swelling, myalgias, nausea, neck pain, numbness, rash, sore throat, vomiting or weakness.   Hypertension  Associated symptoms include anxiety. Pertinent negatives include no chest pain, headaches, neck pain, palpitations or shortness of breath.   Hyperlipidemia  Pertinent negatives include no chest pain, myalgias or shortness of breath.   Diabetes  Pertinent negatives for hypoglycemia include no confusion, dizziness, headaches, nervousness/anxiousness, pallor, seizures, speech difficulty or tremors. Pertinent negatives for diabetes include no chest pain, no fatigue, no polydipsia, no polyphagia, no polyuria and no weakness.   Anxiety  Symptoms include decreased concentration. Patient reports no chest pain, confusion, dizziness, dysphagia, nausea, nervous/anxious behavior, palpitations, shortness of breath or suicidal ideas.         Past Medical History:   Diagnosis Date    Anxiety     Hypercholesteremia     Obesity      Past Surgical History:   Procedure Laterality Date    ANTERIOR CRUCIATE LIGAMENT REPAIR      FRACTURE SURGERY  1993    Broken Arm     Family History   Problem Relation Name Age of Onset    No Known Problems Mother      No Known Problems Father       Social History     Socioeconomic History    Marital status: Single   Tobacco Use    Smoking status: Never    Smokeless tobacco: Never   Substance and Sexual Activity    Alcohol use: Yes     Alcohol/week: 3.0 standard drinks of alcohol     Types: 2 Glasses of wine, 1 Drinks containing 0.5 oz of alcohol per week    Drug use: Never    Sexual activity: Yes     Partners: Female     Birth control/protection: Condom     Social Drivers of  Health     Financial Resource Strain: Low Risk  (4/22/2024)    Overall Financial Resource Strain (CARDIA)     Difficulty of Paying Living Expenses: Not very hard   Food Insecurity: No Food Insecurity (4/22/2024)    Hunger Vital Sign     Worried About Running Out of Food in the Last Year: Never true     Ran Out of Food in the Last Year: Never true   Transportation Needs: No Transportation Needs (4/22/2024)    PRAPARE - Transportation     Lack of Transportation (Medical): No     Lack of Transportation (Non-Medical): No   Physical Activity: Unknown (4/22/2024)    Exercise Vital Sign     Days of Exercise per Week: 2 days   Stress: No Stress Concern Present (4/22/2024)    [employer] - Occupational Stress Questionnaire     Feeling of Stress : Not at all   Housing Stability: Unknown (4/22/2024)    Housing Stability Vital Sign     Unable to Pay for Housing in the Last Year: No     Review of Systems   Constitutional:  Negative for activity change, appetite change, chills, diaphoresis, fatigue, fever and unexpected weight change.   HENT:  Negative for drooling, ear discharge, ear pain, facial swelling, hearing loss, mouth sores, nosebleeds, postnasal drip, rhinorrhea, sinus pressure, sneezing, sore throat, tinnitus, trouble swallowing and voice change.    Eyes:  Negative for photophobia, redness and visual disturbance.   Respiratory:  Negative for apnea, cough, choking, chest tightness, shortness of breath and wheezing.    Cardiovascular:  Negative for chest pain and palpitations.   Gastrointestinal:  Negative for abdominal distention, abdominal pain, anal bleeding, blood in stool, constipation, diarrhea, nausea and vomiting.   Endocrine: Negative for cold intolerance, heat intolerance, polydipsia, polyphagia and polyuria.   Genitourinary:  Negative for difficulty urinating, dysuria, enuresis, flank pain, frequency, genital sores, hematuria and urgency.   Musculoskeletal:  Positive for  arthralgias. Negative for back pain, gait problem, joint swelling, myalgias, neck pain and neck stiffness.   Skin:  Negative for color change, pallor, rash and wound.   Allergic/Immunologic: Negative for food allergies and immunocompromised state.   Neurological:  Negative for dizziness, tremors, seizures, syncope, facial asymmetry, speech difficulty, weakness, light-headedness, numbness and headaches.   Hematological:  Negative for adenopathy. Does not bruise/bleed easily.   Psychiatric/Behavioral:  Positive for decreased concentration and dysphoric mood. Negative for agitation, behavioral problems, confusion, hallucinations, self-injury, sleep disturbance and suicidal ideas. The patient is not nervous/anxious and is not hyperactive.        Objective:      Physical Exam  Vitals and nursing note reviewed.   Constitutional:       General: He is not in acute distress.     Appearance: Normal appearance. He is well-developed. He is not diaphoretic.   HENT:      Head: Normocephalic and atraumatic.      Mouth/Throat:      Pharynx: No oropharyngeal exudate.   Eyes:      General: No scleral icterus.     Pupils: Pupils are equal, round, and reactive to light.   Neck:      Thyroid: No thyromegaly.      Vascular: No carotid bruit or JVD.      Trachea: No tracheal deviation.   Cardiovascular:      Rate and Rhythm: Normal rate and regular rhythm.      Heart sounds: Normal heart sounds.   Pulmonary:      Effort: Pulmonary effort is normal. No respiratory distress.      Breath sounds: Normal breath sounds. No wheezing or rales.   Chest:      Chest wall: No tenderness.   Abdominal:      General: Bowel sounds are normal. There is no distension.      Palpations: Abdomen is soft.      Tenderness: There is no abdominal tenderness. There is no guarding or rebound.   Musculoskeletal:         General: No tenderness. Normal range of motion.      Cervical back: Normal range of motion and neck supple.   Lymphadenopathy:      Cervical: No  cervical adenopathy.   Skin:     General: Skin is warm and dry.      Coloration: Skin is not pale.      Findings: No erythema or rash.   Neurological:      Mental Status: He is alert and oriented to person, place, and time.      Cranial Nerves: No cranial nerve deficit.      Coordination: Coordination normal.   Psychiatric:         Behavior: Behavior normal.         Thought Content: Thought content normal.         Judgment: Judgment normal.         CMP  Sodium   Date Value Ref Range Status   03/12/2024 139 136 - 145 mmol/L Final     Potassium   Date Value Ref Range Status   03/12/2024 4.2 3.5 - 5.1 mmol/L Final     Chloride   Date Value Ref Range Status   03/12/2024 102 95 - 110 mmol/L Final     CO2   Date Value Ref Range Status   03/12/2024 27 23 - 29 mmol/L Final     Glucose   Date Value Ref Range Status   03/12/2024 143 (H) 70 - 110 mg/dL Final     BUN   Date Value Ref Range Status   03/12/2024 17 6 - 20 mg/dL Final     Creatinine   Date Value Ref Range Status   03/12/2024 0.9 0.5 - 1.4 mg/dL Final     Calcium   Date Value Ref Range Status   03/12/2024 9.9 8.7 - 10.5 mg/dL Final     Total Protein   Date Value Ref Range Status   03/12/2024 7.1 6.0 - 8.4 g/dL Final     Albumin   Date Value Ref Range Status   03/12/2024 3.7 3.5 - 5.2 g/dL Final     Total Bilirubin   Date Value Ref Range Status   03/12/2024 0.4 0.1 - 1.0 mg/dL Final     Comment:     For infants and newborns, interpretation of results should be based  on gestational age, weight and in agreement with clinical  observations.    Premature Infant recommended reference ranges:  Up to 24 hours.............<8.0 mg/dL  Up to 48 hours............<12.0 mg/dL  3-5 days..................<15.0 mg/dL  6-29 days.................<15.0 mg/dL       Alkaline Phosphatase   Date Value Ref Range Status   03/12/2024 70 55 - 135 U/L Final     AST   Date Value Ref Range Status   03/12/2024 27 10 - 40 U/L Final     ALT   Date Value Ref Range Status   03/12/2024 39 10 - 44 U/L  Final     Anion Gap   Date Value Ref Range Status   03/12/2024 10 8 - 16 mmol/L Final     eGFR if    Date Value Ref Range Status   02/08/2022 >60.0 >60 mL/min/1.73 m^2 Final     eGFR if non    Date Value Ref Range Status   02/08/2022 >60.0 >60 mL/min/1.73 m^2 Final     Comment:     Calculation used to obtain the estimated glomerular filtration  rate (eGFR) is the CKD-EPI equation.        Lab Results   Component Value Date    WBC 7.13 03/12/2024    HGB 17.3 03/12/2024    HCT 52.2 03/12/2024    MCV 94 03/12/2024     03/12/2024     Lab Results   Component Value Date    CHOL 235 (H) 03/12/2024     Lab Results   Component Value Date    HDL 38 (L) 03/12/2024     Lab Results   Component Value Date    LDLCALC Invalid, Trig>400.0 03/12/2024     Lab Results   Component Value Date    TRIG 504 (H) 03/12/2024     Lab Results   Component Value Date    CHOLHDL 16.2 (L) 03/12/2024     Lab Results   Component Value Date    TSH 3.322 03/12/2024     Lab Results   Component Value Date    HGBA1C 7.1 (H) 03/12/2024     Assessment:       1. Depression, unspecified depression type    2. Anxiety    3. Hypertension, unspecified type    4. Hypercholesteremia    5. Hypertriglyceridemia    6. Lack of concentration    7. Sleep apnea, unspecified type    8. Type 2 diabetes mellitus with hyperglycemia, without long-term current use of insulin        Plan:   Depression, unspecified depression type---------declines psychiatry consult------------    Anxiety    Hypertension, unspecified type------doing ok-    Hypercholesteremia----------consider statin-------  -     Lipid Panel; Future; Expected date: 10/23/2024    Hypertriglyceridemia--------fenofibrate------  -     Lipid Panel; Future; Expected date: 10/23/2024    Lack of concentration----------adderall-----    Sleep apnea, unspecified type    Type 2 diabetes mellitus with hyperglycemia, without long-term current use of insulin-------does not tolerate  metformin-------declines ozempic/mounjaro-  -     Hemoglobin A1C; Future; Expected date: 10/23/2024----------------consider diabetic clinic-      Continue meds--------as above---------f/u 3 months--pt still wants to try lifestyle changes-----

## 2024-10-24 ENCOUNTER — TELEPHONE (OUTPATIENT)
Dept: FAMILY MEDICINE | Facility: CLINIC | Age: 49
End: 2024-10-24
Payer: COMMERCIAL

## 2024-10-24 DIAGNOSIS — E78.5 DYSLIPIDEMIA: Primary | ICD-10-CM

## 2024-10-24 LAB
CHOLEST SERPL-MCNC: 271 MG/DL (ref 120–199)
CHOLEST/HDLC SERPL: 6.3 {RATIO} (ref 2–5)
ESTIMATED AVG GLUCOSE: 137 MG/DL (ref 68–131)
HBA1C MFR BLD: 6.4 % (ref 4–5.6)
HDLC SERPL-MCNC: 43 MG/DL (ref 40–75)
HDLC SERPL: 15.9 % (ref 20–50)
LDLC SERPL CALC-MCNC: ABNORMAL MG/DL (ref 63–159)
NONHDLC SERPL-MCNC: 228 MG/DL
TRIGL SERPL-MCNC: 446 MG/DL (ref 30–150)

## 2024-10-24 RX ORDER — ATORVASTATIN CALCIUM 20 MG/1
20 TABLET, FILM COATED ORAL DAILY
Qty: 90 TABLET | Refills: 3 | Status: SHIPPED | OUTPATIENT
Start: 2024-10-24 | End: 2025-10-24

## 2024-10-24 NOTE — TELEPHONE ENCOUNTER
----- Message from Dimitris sent at 10/24/2024  4:29 PM CDT -----  Contact: [phone]  Type:  Patient Returning Call    Who Called:AYESHA CRAIN [[id number]]  Who Left Message for Patient:Amarilys Dexter MA  Does the patient know what this is regarding?:missed a call from your office  Would the patient rather a call back or a response via [facility]? Call back  Best Call Back Number: [phone]  Additional Information: n [id number]  
Spoke with pt  
Statement Selected

## 2024-10-24 NOTE — TELEPHONE ENCOUNTER
Diabetes is better controlled------continue watch diet,exercise.   Cholesterol is high --recommend start lipitor 20 mg a day---------and repeat cmp,lipids in 1 month---------continue on fenofibrate------------

## 2024-11-06 DIAGNOSIS — G47.00 INSOMNIA, UNSPECIFIED TYPE: ICD-10-CM

## 2024-11-06 DIAGNOSIS — R41.89 COGNITIVE DEFICITS: ICD-10-CM

## 2024-11-06 DIAGNOSIS — R41.840 LACK OF CONCENTRATION: ICD-10-CM

## 2024-11-06 RX ORDER — ESZOPICLONE 2 MG/1
2 TABLET, FILM COATED ORAL NIGHTLY PRN
Qty: 30 TABLET | Refills: 0 | Status: SHIPPED | OUTPATIENT
Start: 2024-11-06

## 2024-11-06 RX ORDER — SILDENAFIL 100 MG/1
100 TABLET, FILM COATED ORAL DAILY PRN
Qty: 15 TABLET | Refills: 6 | Status: SHIPPED | OUTPATIENT
Start: 2024-11-06 | End: 2025-11-06

## 2024-11-06 NOTE — TELEPHONE ENCOUNTER
No care due was identified.  Health [facility] Embedded Care Due Messages. Reference number: [id number].   11/06/2024 4:52:03 PM CST

## 2024-11-06 NOTE — TELEPHONE ENCOUNTER
No care due was identified.  Health [facility] Embedded Care Due Messages. Reference number: [id number].   11/06/2024 10:19:29 AM CST

## 2024-11-07 RX ORDER — DEXTROAMPHETAMINE SACCHARATE, AMPHETAMINE ASPARTATE MONOHYDRATE, DEXTROAMPHETAMINE SULFATE AND AMPHETAMINE SULFATE 5; 5; 5; 5 MG/1; MG/1; MG/1; MG/1
20 CAPSULE, EXTENDED RELEASE ORAL EVERY MORNING
Qty: 30 CAPSULE | Refills: 0 | Status: SHIPPED | OUTPATIENT
Start: 2024-11-07

## 2024-12-08 DIAGNOSIS — G47.00 INSOMNIA, UNSPECIFIED TYPE: ICD-10-CM

## 2024-12-08 NOTE — TELEPHONE ENCOUNTER
Care Due:                  Date            Visit Type   Department     Provider  --------------------------------------------------------------------------------                                EP -                              PRIMARY      [facility]  Last Visit: 10-      CARE ([facility])   PK Nicholas                              EP -                              PRIMARY      [facility]  Next Visit: 01-      CARE ([facility])   PK Nicholas                                                            Last  Test          Frequency    Reason                     Performed    Due Date  --------------------------------------------------------------------------------    CBC.........  12 months..  fenofibrate..............  03- 03-    CMP.........  12 months..  atorvastatin, fenofibrate  03- 03-    [facility] Embedded Care Due Messages. Reference number: [id number].   12/08/2024 11:29:54 AM CST

## 2024-12-09 RX ORDER — ESZOPICLONE 2 MG/1
2 TABLET, FILM COATED ORAL NIGHTLY PRN
Qty: 30 TABLET | Refills: 0 | Status: SHIPPED | OUTPATIENT
Start: 2024-12-09

## 2024-12-09 RX ORDER — SILDENAFIL 100 MG/1
100 TABLET, FILM COATED ORAL DAILY PRN
Qty: 15 TABLET | Refills: 11 | Status: SHIPPED | OUTPATIENT
Start: 2024-12-09 | End: 2025-12-09

## 2024-12-09 RX ORDER — FENOFIBRATE 160 MG/1
160 TABLET ORAL DAILY
Qty: 90 TABLET | Refills: 0 | Status: SHIPPED | OUTPATIENT
Start: 2024-12-09

## 2024-12-09 NOTE — TELEPHONE ENCOUNTER
Refill Routing Note   Medication(s) are not appropriate for processing by [facility] for the following reason(s):        Outside of protocol    [facility] action(s):  Approve  Route   Requires labs : Yes             Appointments  past 12m or future 3m with PCP    Date Provider   Last Visit   10/23/2024 Avila Nicholas MD   Next Visit   1/29/2025 Avila Nicholas MD   ED visits in past 90 days: 0        Note composed:10:37 AM 12/09/2024

## 2024-12-15 DIAGNOSIS — R41.840 LACK OF CONCENTRATION: ICD-10-CM

## 2024-12-15 DIAGNOSIS — R41.89 COGNITIVE DEFICITS: ICD-10-CM

## 2024-12-15 NOTE — TELEPHONE ENCOUNTER
No care due was identified.  [facility] Embedded Care Due Messages. Reference number: [id number].   12/15/2024 9:30:43 AM CST

## 2024-12-16 RX ORDER — DEXTROAMPHETAMINE SACCHARATE, AMPHETAMINE ASPARTATE MONOHYDRATE, DEXTROAMPHETAMINE SULFATE AND AMPHETAMINE SULFATE 5; 5; 5; 5 MG/1; MG/1; MG/1; MG/1
20 CAPSULE, EXTENDED RELEASE ORAL EVERY MORNING
Qty: 30 CAPSULE | Refills: 0 | Status: SHIPPED | OUTPATIENT
Start: 2024-12-16

## 2025-01-11 DIAGNOSIS — G47.00 INSOMNIA, UNSPECIFIED TYPE: ICD-10-CM

## 2025-01-11 NOTE — TELEPHONE ENCOUNTER
No care due was identified.  Health [facility] Embedded Care Due Messages. Reference number: [id number].   1/11/2025 12:02:26 PM CST

## 2025-01-13 RX ORDER — ESZOPICLONE 2 MG/1
2 TABLET, FILM COATED ORAL NIGHTLY PRN
Qty: 30 TABLET | Refills: 0 | Status: SHIPPED | OUTPATIENT
Start: 2025-01-13

## 2025-01-22 ENCOUNTER — PATIENT MESSAGE (OUTPATIENT)
Dept: FAMILY MEDICINE | Facility: CLINIC | Age: 50
End: 2025-01-22
Payer: COMMERCIAL

## 2025-01-22 DIAGNOSIS — R41.89 COGNITIVE DEFICITS: ICD-10-CM

## 2025-01-22 DIAGNOSIS — R41.840 LACK OF CONCENTRATION: ICD-10-CM

## 2025-01-22 NOTE — TELEPHONE ENCOUNTER
No care due was identified.  Health [facility] Embedded Care Due Messages. Reference number: [id number].   1/22/2025 9:42:03 AM CST

## 2025-01-24 RX ORDER — DEXTROAMPHETAMINE SACCHARATE, AMPHETAMINE ASPARTATE MONOHYDRATE, DEXTROAMPHETAMINE SULFATE AND AMPHETAMINE SULFATE 5; 5; 5; 5 MG/1; MG/1; MG/1; MG/1
20 CAPSULE, EXTENDED RELEASE ORAL EVERY MORNING
Qty: 30 CAPSULE | Refills: 0 | Status: SHIPPED | OUTPATIENT
Start: 2025-01-24

## 2025-01-29 ENCOUNTER — LAB VISIT (OUTPATIENT)
Dept: LAB | Facility: HOSPITAL | Age: 50
End: 2025-01-29
Attending: INTERNAL MEDICINE
Payer: COMMERCIAL

## 2025-01-29 ENCOUNTER — OFFICE VISIT (OUTPATIENT)
Dept: FAMILY MEDICINE | Facility: CLINIC | Age: 50
End: 2025-01-29
Payer: COMMERCIAL

## 2025-01-29 VITALS
TEMPERATURE: 97 F | SYSTOLIC BLOOD PRESSURE: 138 MMHG | BODY MASS INDEX: 69.79 KG/M2 | OXYGEN SATURATION: 95 % | DIASTOLIC BLOOD PRESSURE: 88 MMHG | HEIGHT: 60 IN | HEART RATE: 85 BPM

## 2025-01-29 DIAGNOSIS — E78.00 HYPERCHOLESTEREMIA: ICD-10-CM

## 2025-01-29 DIAGNOSIS — G47.33 OSA ON CPAP: ICD-10-CM

## 2025-01-29 DIAGNOSIS — F41.8 ANXIOUS DEPRESSION: Primary | ICD-10-CM

## 2025-01-29 DIAGNOSIS — E78.1 HYPERTRIGLYCERIDEMIA: ICD-10-CM

## 2025-01-29 DIAGNOSIS — E11.65 TYPE 2 DIABETES MELLITUS WITH HYPERGLYCEMIA, WITHOUT LONG-TERM CURRENT USE OF INSULIN: ICD-10-CM

## 2025-01-29 DIAGNOSIS — I10 HYPERTENSION, UNSPECIFIED TYPE: ICD-10-CM

## 2025-01-29 DIAGNOSIS — R41.840 LACK OF CONCENTRATION: ICD-10-CM

## 2025-01-29 DIAGNOSIS — R05.9 COUGH, UNSPECIFIED TYPE: ICD-10-CM

## 2025-01-29 LAB
ALBUMIN SERPL BCP-MCNC: 3.8 G/DL (ref 3.5–5.2)
ALP SERPL-CCNC: 71 U/L (ref 40–150)
ALT SERPL W/O P-5'-P-CCNC: 42 U/L (ref 10–44)
ANION GAP SERPL CALC-SCNC: 11 MMOL/L (ref 8–16)
AST SERPL-CCNC: 35 U/L (ref 10–40)
BILIRUB SERPL-MCNC: 0.4 MG/DL (ref 0.1–1)
BUN SERPL-MCNC: 17 MG/DL (ref 6–20)
CALCIUM SERPL-MCNC: 10.2 MG/DL (ref 8.7–10.5)
CHLORIDE SERPL-SCNC: 101 MMOL/L (ref 95–110)
CHOLEST SERPL-MCNC: 158 MG/DL (ref 120–199)
CHOLEST/HDLC SERPL: 4.5 {RATIO} (ref 2–5)
CO2 SERPL-SCNC: 25 MMOL/L (ref 23–29)
CREAT SERPL-MCNC: 0.9 MG/DL (ref 0.5–1.4)
EST. GFR  (NO RACE VARIABLE): >60 ML/MIN/1.73 M^2
GLUCOSE SERPL-MCNC: 128 MG/DL (ref 70–110)
HDLC SERPL-MCNC: 35 MG/DL (ref 40–75)
HDLC SERPL: 22.2 % (ref 20–50)
LDLC SERPL CALC-MCNC: ABNORMAL MG/DL (ref 63–159)
NONHDLC SERPL-MCNC: 123 MG/DL
POTASSIUM SERPL-SCNC: 4.2 MMOL/L (ref 3.5–5.1)
PROT SERPL-MCNC: 7.4 G/DL (ref 6–8.4)
SODIUM SERPL-SCNC: 137 MMOL/L (ref 136–145)
TRIGL SERPL-MCNC: 424 MG/DL (ref 30–150)

## 2025-01-29 PROCEDURE — 3008F BODY MASS INDEX DOCD: CPT | Mod: CPTII,S$GLB,, | Performed by: INTERNAL MEDICINE

## 2025-01-29 PROCEDURE — 80053 COMPREHEN METABOLIC PANEL: CPT | Performed by: INTERNAL MEDICINE

## 2025-01-29 PROCEDURE — 99999 PR PBB SHADOW E&M-EST. PATIENT-LVL IV: CPT | Mod: PBBFAC,,, | Performed by: INTERNAL MEDICINE

## 2025-01-29 PROCEDURE — 3079F DIAST BP 80-89 MM HG: CPT | Mod: CPTII,S$GLB,, | Performed by: INTERNAL MEDICINE

## 2025-01-29 PROCEDURE — 99396 PREV VISIT EST AGE 40-64: CPT | Mod: S$GLB,,, | Performed by: INTERNAL MEDICINE

## 2025-01-29 PROCEDURE — 3075F SYST BP GE 130 - 139MM HG: CPT | Mod: CPTII,S$GLB,, | Performed by: INTERNAL MEDICINE

## 2025-01-29 PROCEDURE — 80061 LIPID PANEL: CPT | Performed by: INTERNAL MEDICINE

## 2025-01-29 PROCEDURE — 36415 COLL VENOUS BLD VENIPUNCTURE: CPT | Mod: PO | Performed by: INTERNAL MEDICINE

## 2025-01-29 RX ORDER — PROMETHAZINE HYDROCHLORIDE AND DEXTROMETHORPHAN HYDROBROMIDE 6.25; 15 MG/5ML; MG/5ML
5 SYRUP ORAL EVERY 8 HOURS PRN
Qty: 240 ML | Refills: 0 | Status: SHIPPED | OUTPATIENT
Start: 2025-01-29 | End: 2025-02-14

## 2025-01-29 RX ORDER — FLUOXETINE 10 MG/1
10 CAPSULE ORAL DAILY
Qty: 30 CAPSULE | Refills: 11 | Status: SHIPPED | OUTPATIENT
Start: 2025-01-29 | End: 2026-01-29

## 2025-01-29 RX ORDER — AZITHROMYCIN 250 MG/1
TABLET, FILM COATED ORAL
Qty: 6 TABLET | Refills: 0 | Status: SHIPPED | OUTPATIENT
Start: 2025-01-29 | End: 2025-02-03

## 2025-01-29 NOTE — PROGRESS NOTES
Subjective:       Patient ID: Sergio Parra is a 49 y.o. male.    Chief Complaint: Follow-up (3 month), Hypertension, Hyperlipidemia, and Diabetes    Follow-up  Associated symptoms include arthralgias, congestion and coughing. Pertinent negatives include no abdominal pain, chest pain, chills, diaphoresis, fatigue, fever, headaches, joint swelling, myalgias, nausea, neck pain, numbness, rash, sore throat, vomiting or weakness.   Hypertension  Pertinent negatives include no chest pain, headaches, neck pain, palpitations or shortness of breath.   Hyperlipidemia  Pertinent negatives include no chest pain, myalgias or shortness of breath.   Diabetes  Pertinent negatives for hypoglycemia include no confusion, dizziness, headaches, nervousness/anxiousness, pallor, seizures, speech difficulty or tremors. Pertinent negatives for diabetes include no chest pain, no fatigue, no polydipsia, no polyphagia, no polyuria and no weakness.     Past Medical History:   Diagnosis Date    Anxiety     Hypercholesteremia     Obesity      Past Surgical History:   Procedure Laterality Date    ANTERIOR CRUCIATE LIGAMENT REPAIR      FRACTURE SURGERY  1993    Broken Arm     Family History   Problem Relation Name Age of Onset    No Known Problems Mother      No Known Problems Father       Social History     Socioeconomic History    Marital status: Single   Tobacco Use    Smoking status: Never    Smokeless tobacco: Never   Substance and Sexual Activity    Alcohol use: Yes     Alcohol/week: 3.0 standard drinks of alcohol     Types: 2 Glasses of wine, 1 Drinks containing 0.5 oz of alcohol per week    Drug use: Never    Sexual activity: Yes     Partners: Female     Birth control/protection: Condom     Social Drivers of Health     Financial Resource Strain: Low Risk  (4/22/2024)    Overall Financial Resource Strain (CARDIA)     Difficulty of Paying Living Expenses: Not very hard   Food Insecurity: No Food Insecurity (4/22/2024)    Hunger Vital  Sign     Worried About Running Out of Food in the Last Year: Never true     Ran Out of Food in the Last Year: Never true   Transportation Needs: No Transportation Needs (4/22/2024)    PRAPARE - Transportation     Lack of Transportation (Medical): No     Lack of Transportation (Non-Medical): No   Physical Activity: Unknown (4/22/2024)    Exercise Vital Sign     Days of Exercise per Week: 2 days   Stress: No Stress Concern Present (4/22/2024)    [employer] - Occupational Stress Questionnaire     Feeling of Stress : Not at all   Housing Stability: Unknown (4/22/2024)    Housing Stability Vital Sign     Unable to Pay for Housing in the Last Year: No     Review of Systems   Constitutional:  Negative for activity change, appetite change, chills, diaphoresis, fatigue, fever and unexpected weight change.   HENT:  Positive for congestion. Negative for drooling, ear discharge, ear pain, facial swelling, hearing loss, mouth sores, nosebleeds, postnasal drip, rhinorrhea, sinus pressure, sneezing, sore throat, tinnitus, trouble swallowing and voice change.    Eyes:  Negative for photophobia, redness and visual disturbance.   Respiratory:  Positive for cough. Negative for apnea, choking, chest tightness, shortness of breath and wheezing.    Cardiovascular:  Negative for chest pain, palpitations and leg swelling.   Gastrointestinal:  Negative for abdominal distention, abdominal pain, anal bleeding, blood in stool, constipation, diarrhea, nausea, rectal pain and vomiting.   Endocrine: Negative for cold intolerance, heat intolerance, polydipsia, polyphagia and polyuria.   Genitourinary:  Negative for difficulty urinating, dysuria, enuresis, flank pain, frequency, genital sores, hematuria and urgency.   Musculoskeletal:  Positive for arthralgias. Negative for back pain, gait problem, joint swelling, myalgias, neck pain and neck stiffness.   Skin:  Negative for color change, pallor, rash and wound.    Allergic/Immunologic: Negative for food allergies and immunocompromised state.   Neurological:  Negative for dizziness, tremors, seizures, syncope, facial asymmetry, speech difficulty, weakness, light-headedness, numbness and headaches.   Hematological:  Negative for adenopathy. Does not bruise/bleed easily.   Psychiatric/Behavioral:  Positive for dysphoric mood and sleep disturbance. Negative for agitation, behavioral problems, confusion, decreased concentration, hallucinations, self-injury and suicidal ideas. The patient is not nervous/anxious and is not hyperactive.        Objective:      Physical Exam  Vitals and nursing note reviewed.   Constitutional:       General: He is not in acute distress.     Appearance: Normal appearance. He is well-developed. He is not diaphoretic.   HENT:      Head: Normocephalic and atraumatic.      Mouth/Throat:      Pharynx: No oropharyngeal exudate.   Eyes:      General: No scleral icterus.     Pupils: Pupils are equal, round, and reactive to light.   Neck:      Thyroid: No thyromegaly.      Vascular: No carotid bruit or JVD.      Trachea: No tracheal deviation.   Cardiovascular:      Rate and Rhythm: Normal rate and regular rhythm.      Heart sounds: Normal heart sounds.   Pulmonary:      Effort: Pulmonary effort is normal. No respiratory distress.      Breath sounds: Normal breath sounds. No wheezing or rales.   Chest:      Chest wall: No tenderness.   Abdominal:      General: Bowel sounds are normal. There is no distension.      Palpations: Abdomen is soft.      Tenderness: There is no abdominal tenderness. There is no guarding or rebound.   Musculoskeletal:         General: No tenderness. Normal range of motion.      Cervical back: Normal range of motion and neck supple.   Lymphadenopathy:      Cervical: No cervical adenopathy.   Skin:     General: Skin is warm and dry.      Coloration: Skin is not pale.      Findings: No erythema or rash.   Neurological:      Mental Status:  He is alert and oriented to person, place, and time.      Cranial Nerves: No cranial nerve deficit.      Coordination: Coordination normal.   Psychiatric:         Behavior: Behavior normal.         Thought Content: Thought content normal.         Judgment: Judgment normal.         CMP  Sodium   Date Value Ref Range Status   03/12/2024 139 136 - 145 mmol/L Final     Potassium   Date Value Ref Range Status   03/12/2024 4.2 3.5 - 5.1 mmol/L Final     Chloride   Date Value Ref Range Status   03/12/2024 102 95 - 110 mmol/L Final     CO2   Date Value Ref Range Status   03/12/2024 27 23 - 29 mmol/L Final     Glucose   Date Value Ref Range Status   03/12/2024 143 (H) 70 - 110 mg/dL Final     BUN   Date Value Ref Range Status   03/12/2024 17 6 - 20 mg/dL Final     Creatinine   Date Value Ref Range Status   03/12/2024 0.9 0.5 - 1.4 mg/dL Final     Calcium   Date Value Ref Range Status   03/12/2024 9.9 8.7 - 10.5 mg/dL Final     Total Protein   Date Value Ref Range Status   03/12/2024 7.1 6.0 - 8.4 g/dL Final     Albumin   Date Value Ref Range Status   03/12/2024 3.7 3.5 - 5.2 g/dL Final     Total Bilirubin   Date Value Ref Range Status   03/12/2024 0.4 0.1 - 1.0 mg/dL Final     Comment:     For infants and newborns, interpretation of results should be based  on gestational age, weight and in agreement with clinical  observations.    Premature Infant recommended reference ranges:  Up to 24 hours.............<8.0 mg/dL  Up to 48 hours............<12.0 mg/dL  3-5 days..................<15.0 mg/dL  6-29 days.................<15.0 mg/dL       Alkaline Phosphatase   Date Value Ref Range Status   03/12/2024 70 55 - 135 U/L Final     AST   Date Value Ref Range Status   03/12/2024 27 10 - 40 U/L Final     ALT   Date Value Ref Range Status   03/12/2024 39 10 - 44 U/L Final     Anion Gap   Date Value Ref Range Status   03/12/2024 10 8 - 16 mmol/L Final     eGFR if    Date Value Ref Range Status   02/08/2022 >60.0 >60  mL/min/1.73 m^2 Final     eGFR if non    Date Value Ref Range Status   02/08/2022 >60.0 >60 mL/min/1.73 m^2 Final     Comment:     Calculation used to obtain the estimated glomerular filtration  rate (eGFR) is the CKD-EPI equation.        Lab Results   Component Value Date    WBC 7.13 03/12/2024    HGB 17.3 03/12/2024    HCT 52.2 03/12/2024    MCV 94 03/12/2024     03/12/2024     Lab Results   Component Value Date    CHOL 271 (H) 10/23/2024     Lab Results   Component Value Date    HDL 43 10/23/2024     Lab Results   Component Value Date    LDLCALC Invalid, Trig>400.0 10/23/2024     Lab Results   Component Value Date    TRIG 446 (H) 10/23/2024     Lab Results   Component Value Date    CHOLHDL 15.9 (L) 10/23/2024     Lab Results   Component Value Date    TSH 3.322 03/12/2024     Lab Results   Component Value Date    HGBA1C 6.4 (H) 10/23/2024     Assessment:       1. Anxious depression    2. Hypertension, unspecified type    3. Hypercholesteremia    4. Hypertriglyceridemia    5. Lack of concentration    6. EMILY on CPAP    7. Type 2 diabetes mellitus with hyperglycemia, without long-term current use of insulin    8. Cough, unspecified type        Plan:   Anxious depression--------try prozac--------follow  -     FLUoxetine 10 MG capsule; Take 1 capsule (10 mg total) by mouth once daily.  Dispense: 30 capsule; Refill: 11    Hypertension, unspecified type  -     Comprehensive Metabolic Panel; Future; Expected date: 01/29/2025    Hypercholesteremia  -     Lipid Panel; Future; Expected date: 01/29/2025    Hypertriglyceridemia    Lack of concentration    EMILY on CPAP  -     Ambulatory referral/consult to Pulmonology; Future; Expected date: 02/05/2025    Type 2 diabetes mellitus with hyperglycemia, without long-term current use of insulin-------diet,exercise------------declines med-----    Cough, unspecified type  -     azithromycin (Z-HILTON) 250 MG tablet; Take 2 tablets by mouth on day 1; Take 1 tablet by  mouth on days 2-5  Dispense: 6 tablet; Refill: 0  -     promethazine-dextromethorphan (PROMETHAZINE-DM) 6.25-15 mg/5 mL Syrp; Take 5 mLs by mouth every 8 (eight) hours as needed (cough).  Dispense: 240 mL; Refill: 0  -     X-Ray Chest PA And Lateral; Future; Expected date: 01/29/2025      Stable-----continue meds----------as above------------------f/u 3 months----------?colon/cologuard------

## 2025-01-30 ENCOUNTER — PATIENT MESSAGE (OUTPATIENT)
Dept: FAMILY MEDICINE | Facility: CLINIC | Age: 50
End: 2025-01-30
Payer: COMMERCIAL

## 2025-01-30 ENCOUNTER — PATIENT OUTREACH (OUTPATIENT)
Dept: ADMINISTRATIVE | Facility: HOSPITAL | Age: 50
End: 2025-01-30
Payer: COMMERCIAL

## 2025-02-06 DIAGNOSIS — G47.00 INSOMNIA, UNSPECIFIED TYPE: ICD-10-CM

## 2025-02-06 RX ORDER — ESZOPICLONE 2 MG/1
2 TABLET, FILM COATED ORAL NIGHTLY PRN
Qty: 30 TABLET | Refills: 0 | Status: SHIPPED | OUTPATIENT
Start: 2025-02-06

## 2025-02-06 NOTE — TELEPHONE ENCOUNTER
No care due was identified.  [facility] Embedded Care Due Messages. Reference number: [id number].   2/06/2025 8:28:19 AM CST

## 2025-02-07 ENCOUNTER — OFFICE VISIT (OUTPATIENT)
Dept: PULMONOLOGY | Facility: CLINIC | Age: 50
End: 2025-02-07
Payer: COMMERCIAL

## 2025-02-07 VITALS
DIASTOLIC BLOOD PRESSURE: 90 MMHG | OXYGEN SATURATION: 97 % | HEART RATE: 114 BPM | WEIGHT: 315 LBS | RESPIRATION RATE: 17 BRPM | BODY MASS INDEX: 50.62 KG/M2 | HEIGHT: 66 IN | SYSTOLIC BLOOD PRESSURE: 144 MMHG

## 2025-02-07 DIAGNOSIS — E66.01 CLASS 3 SEVERE OBESITY DUE TO EXCESS CALORIES WITH SERIOUS COMORBIDITY AND BODY MASS INDEX (BMI) OF 50.0 TO 59.9 IN ADULT: ICD-10-CM

## 2025-02-07 DIAGNOSIS — E66.813 CLASS 3 SEVERE OBESITY DUE TO EXCESS CALORIES WITH SERIOUS COMORBIDITY AND BODY MASS INDEX (BMI) OF 50.0 TO 59.9 IN ADULT: ICD-10-CM

## 2025-02-07 DIAGNOSIS — G47.33 OSA ON CPAP: Primary | ICD-10-CM

## 2025-02-07 PROCEDURE — 1160F RVW MEDS BY RX/DR IN RCRD: CPT | Mod: CPTII,S$GLB,, | Performed by: PHYSICIAN ASSISTANT

## 2025-02-07 PROCEDURE — 99999 PR PBB SHADOW E&M-EST. PATIENT-LVL IV: CPT | Mod: PBBFAC,,, | Performed by: PHYSICIAN ASSISTANT

## 2025-02-07 PROCEDURE — 3008F BODY MASS INDEX DOCD: CPT | Mod: CPTII,S$GLB,, | Performed by: PHYSICIAN ASSISTANT

## 2025-02-07 PROCEDURE — 3077F SYST BP >= 140 MM HG: CPT | Mod: CPTII,S$GLB,, | Performed by: PHYSICIAN ASSISTANT

## 2025-02-07 PROCEDURE — 3080F DIAST BP >= 90 MM HG: CPT | Mod: CPTII,S$GLB,, | Performed by: PHYSICIAN ASSISTANT

## 2025-02-07 PROCEDURE — 99213 OFFICE O/P EST LOW 20 MIN: CPT | Mod: S$GLB,,, | Performed by: PHYSICIAN ASSISTANT

## 2025-02-07 PROCEDURE — 1159F MED LIST DOCD IN RCRD: CPT | Mod: CPTII,S$GLB,, | Performed by: PHYSICIAN ASSISTANT

## 2025-02-07 NOTE — PROGRESS NOTES
Subjective:       Patient ID: Sergio Parra is a 49 y.o. male.    Chief Complaint: Sleep Apnea      2/10/2025  Established patient here for EMILY on CPAP follow up  Has not been using cpap, needs new supplies, also requesting information on other options to treat sleep apnea  Home sleep study 10/10/2019 severe obstructive sleep apnea 48.  Overall RDI 71.  Mean O2 sat 91.9%.   10/18/2019 orders auto CPAP 6-20 cm. Full face mask.   Obtained replacement CPAP machine Laurie Dreamstation 2  Complains of not liking CPAP mask, feels claustrophobic wearing Full Face CPAP Mask  Asks about inspire  Does not sleep well due to sleep apnea and discomfort with Full Face CPAP Mask, wants to try nasal mask        2/7/2025   EPWORTH SLEEPINESS SCALE   Sitting and reading 2   Watching TV 2   Sitting, inactive in a public place (e.g. a theatre or a meeting) 0   As a passenger in a car for an hour without a break 0   Lying down to rest in the afternoon when circumstances permit 2   Sitting and talking to someone 0   Sitting quietly after a lunch without alcohol 0   In a car, while stopped for a few minutes in traffic 0   Total score 6       Immunization History   Administered Date(s) Administered    COVID-19, MRNA, LN-S, PF (Pfizer) (Purple Cap) 08/17/2021, 09/08/2021    Hepatitis A, Adult 01/15/2020, 07/21/2020    Typhoid - Live 01/15/2020      Tobacco Use: Low Risk  (2/10/2025)    Patient History     Smoking Tobacco Use: Never     Smokeless Tobacco Use: Never     Passive Exposure: Not on file      Past Medical History:   Diagnosis Date    Anxiety     Hypercholesteremia     Obesity       Current Outpatient Medications on File Prior to Visit   Medication Sig Dispense Refill    ALPRAZolam (XANAX) 1 MG tablet TAKE 1 TABLET BY MOUTH ONCE DAILY AS NEEDED FOR ANXIETY 30 tablet 3    atorvastatin (LIPITOR) 20 MG tablet Take 1 tablet (20 mg total) by mouth once daily. 90 tablet 3    dextroamphetamine-amphetamine (ADDERALL XR) 20 MG 24 hr  "capsule Take 1 capsule (20 mg total) by mouth every morning. 30 capsule 0    eszopiclone (LUNESTA) 2 MG Tab Take 1 tablet (2 mg total) by mouth nightly as needed. 30 tablet 0    fenofibrate 160 MG Tab Take 1 tablet (160 mg total) by mouth once daily. 90 tablet 0    FLUoxetine 10 MG capsule Take 1 capsule (10 mg total) by mouth once daily. 30 capsule 11    ibuprofen (ADVIL,MOTRIN) 800 MG tablet Take 1 tablet (800 mg total) by mouth every 6 (six) hours as needed. 30 tablet 1    promethazine-dextromethorphan (PROMETHAZINE-DM) 6.25-15 mg/5 mL Syrp Take 5 mLs by mouth every 8 (eight) hours as needed (cough). 240 mL 0    sildenafiL (VIAGRA) 100 MG tablet Take 1 tablet (100 mg total) by mouth daily as needed for Erectile Dysfunction. 15 tablet 11     No current facility-administered medications on file prior to visit.        Review of Systems   Constitutional:  Negative for fever.   Respiratory:  Positive for snoring and somnolence. Negative for cough, shortness of breath and wheezing.    Cardiovascular:  Negative for chest pain.   Gastrointestinal:  Negative for nausea and vomiting.   Neurological:  Negative for dizziness and headaches.   Psychiatric/Behavioral:  Positive for sleep disturbance.    All other systems reviewed and are negative.      Objective:       Vitals:    02/07/25 1615   BP: (!) 144/90   Patient Position: Sitting   Pulse: (!) 114   Resp: 17   SpO2: 97%   Weight: (!) 165.5 kg (364 lb 13.8 oz)   Height: 5' 6" (1.676 m)       Physical Exam   Constitutional: He is oriented to person, place, and time. He appears well-developed and well-nourished. No distress.   HENT:   Head: Normocephalic.   Mouth/Throat: Oropharynx is clear and moist.   Cardiovascular: Normal rate and regular rhythm.   Pulmonary/Chest: Effort normal. No respiratory distress. He has no wheezes. He has no rhonchi. He has no rales.   Musculoskeletal:         General: No edema.      Cervical back: Normal range of motion and neck supple. "   Neurological: He is alert and oriented to person, place, and time. Gait normal.   Skin: Skin is warm and dry.   Psychiatric:   aggitated   Vitals reviewed.    Personal Diagnostic Review    US Soft Tissue Lower Back  Narrative: EXAMINATION:  US SOFT TISSUE LOWER BACK    CLINICAL HISTORY:  eval for right lower back lipoma;  Localized swelling, mass and lump, trunk    TECHNIQUE:  Grayscale and color Doppler ultrasound of the mid back, at patient's reported area of palpable abnormality.    COMPARISON:  None    FINDINGS:  16 x 10 mm ovoid fat attenuation mostly well-circumscribed structure without suspicious features, most compatible with a small lipoma, 5 mm deep to the skin surface.  No evidence of hernia, lymphadenopathy, fluid collection, vascular malformation, or foreign body.  Impression: Findings most compatible with a small 16 x 10 mm lipoma in the superficial subcutaneous fat of the mid back.    Electronically signed by: Lawrence Dempsey  Date:    12/19/2022  Time:    15:21            Assessment/Plan:       Problem List Items Addressed This Visit          Endocrine    Class 3 severe obesity due to excess calories with serious comorbidity and body mass index (BMI) of 50.0 to 59.9 in adult       Other    EMILY on CPAP - Primary     Recommend CPAP titration study; history of severe sleep apnea; high AHI on previous cpap downloads  He declines  Discussed criteria for inspire; would need weight loss  He requests order for nasal cpap mask; order placed, informed him he will need mask fit visit if switching to new kind of mask, he says no this would not work for him and he will just get new supplies  Again recommend titration study and weight loss  EMILY and implications on health discussed               Relevant Orders    CPAP/BIPAP SUPPLIES   Follow up if symptoms worsen or fail to improve.    Discussed diagnosis, its evaluation, treatment and usual course. All questions answered.    Patient verbalized understanding of plan  and left in no acute distress    Thank you for the courtesy of participating in the care of this patient    Elizabeth G Blough, PA-C [facility]

## 2025-02-10 NOTE — ASSESSMENT & PLAN NOTE
Recommend CPAP titration study; history of severe sleep apnea; high AHI on previous cpap downloads  He declines  Discussed criteria for inspire; would need weight loss  He requests order for nasal cpap mask; order placed, informed him he will need mask fit visit if switching to new kind of mask, he says no this would not work for him and he will just get new supplies  Again recommend titration study and weight loss  EMILY and implications on health discussed

## 2025-02-13 ENCOUNTER — PATIENT MESSAGE (OUTPATIENT)
Dept: ADMINISTRATIVE | Facility: HOSPITAL | Age: 50
End: 2025-02-13
Payer: COMMERCIAL

## 2025-02-14 DIAGNOSIS — Z12.11 SCREENING FOR COLON CANCER: ICD-10-CM

## 2025-02-16 DIAGNOSIS — R41.840 LACK OF CONCENTRATION: ICD-10-CM

## 2025-02-16 DIAGNOSIS — R41.89 COGNITIVE DEFICITS: ICD-10-CM

## 2025-02-16 NOTE — TELEPHONE ENCOUNTER
Care Due:                  Date            Visit Type   Department     Provider  --------------------------------------------------------------------------------                                EP -                              PRIMARY      [facility]  Last Visit: 01-      CARE ([facility])   MEDICINE       Avila Nicohlas  Next Visit: None Scheduled  None         None Found                                                            Last  Test          Frequency    Reason                     Performed    Due Date  --------------------------------------------------------------------------------    CBC.........  12 months..  fenofibrate..............  03- 03-    [facility] Embedded Care Due Messages. Reference number: [id number].   2/16/2025 8:09:14 AM CST

## 2025-02-17 ENCOUNTER — PATIENT MESSAGE (OUTPATIENT)
Dept: FAMILY MEDICINE | Facility: CLINIC | Age: 50
End: 2025-02-17
Payer: COMMERCIAL

## 2025-02-17 RX ORDER — DEXTROAMPHETAMINE SACCHARATE, AMPHETAMINE ASPARTATE MONOHYDRATE, DEXTROAMPHETAMINE SULFATE AND AMPHETAMINE SULFATE 5; 5; 5; 5 MG/1; MG/1; MG/1; MG/1
20 CAPSULE, EXTENDED RELEASE ORAL EVERY MORNING
Qty: 30 CAPSULE | Refills: 0 | Status: SHIPPED | OUTPATIENT
Start: 2025-02-17

## 2025-02-24 DIAGNOSIS — F41.8 ANXIOUS DEPRESSION: ICD-10-CM

## 2025-02-24 RX ORDER — ATORVASTATIN CALCIUM 20 MG/1
20 TABLET, FILM COATED ORAL DAILY
Qty: 90 TABLET | Refills: 3 | Status: SHIPPED | OUTPATIENT
Start: 2025-02-24 | End: 2026-02-24

## 2025-02-24 NOTE — TELEPHONE ENCOUNTER
No care due was identified.  Health [facility] Embedded Care Due Messages. Reference number: [id number].   2/24/2025 9:43:51 AM CST

## 2025-02-25 RX ORDER — FLUOXETINE 10 MG/1
10 CAPSULE ORAL DAILY
Qty: 90 CAPSULE | Refills: 3 | Status: SHIPPED | OUTPATIENT
Start: 2025-02-25 | End: 2026-02-25

## 2025-02-25 NOTE — TELEPHONE ENCOUNTER
Refill Routing Note   Medication(s) are not appropriate for processing by [facility] for the following reason(s):        New or recently adjusted medication    [facility] action(s):  Approve  Defer               Appointments  past 12m or future 3m with PCP    Date Provider   Last Visit   1/29/2025 Avila Nicholas MD   Next Visit   5/1/2025 Avila Nicholas MD   ED visits in past 90 days: 0        Note composed:9:09 PM 02/24/2025

## 2025-03-10 DIAGNOSIS — G47.00 INSOMNIA, UNSPECIFIED TYPE: ICD-10-CM

## 2025-03-10 NOTE — TELEPHONE ENCOUNTER
No care due was identified.  [facility] Embedded Care Due Messages. Reference number: [id number].   3/10/2025 9:59:17 AM CDT

## 2025-03-11 RX ORDER — ESZOPICLONE 2 MG/1
2 TABLET, FILM COATED ORAL NIGHTLY PRN
Qty: 30 TABLET | Refills: 0 | Status: SHIPPED | OUTPATIENT
Start: 2025-03-11

## 2025-03-23 DIAGNOSIS — R41.840 LACK OF CONCENTRATION: ICD-10-CM

## 2025-03-23 DIAGNOSIS — R41.89 COGNITIVE DEFICITS: ICD-10-CM

## 2025-03-23 NOTE — TELEPHONE ENCOUNTER
No care due was identified.  [facility] Embedded Care Due Messages. Reference number: [id number].   3/23/2025 1:12:55 PM CDT

## 2025-03-24 ENCOUNTER — PATIENT MESSAGE (OUTPATIENT)
Dept: FAMILY MEDICINE | Facility: CLINIC | Age: 50
End: 2025-03-24

## 2025-03-24 RX ORDER — DEXTROAMPHETAMINE SACCHARATE, AMPHETAMINE ASPARTATE MONOHYDRATE, DEXTROAMPHETAMINE SULFATE AND AMPHETAMINE SULFATE 5; 5; 5; 5 MG/1; MG/1; MG/1; MG/1
20 CAPSULE, EXTENDED RELEASE ORAL EVERY MORNING
Qty: 30 CAPSULE | Refills: 0 | Status: SHIPPED | OUTPATIENT
Start: 2025-03-24

## 2025-03-30 NOTE — TELEPHONE ENCOUNTER
No care due was identified.  [facility] Embedded Care Due Messages. Reference number: [id number].   3/30/2025 4:51:33 PM CDT

## 2025-03-31 RX ORDER — FENOFIBRATE 160 MG/1
160 TABLET ORAL DAILY
Qty: 90 TABLET | Refills: 0 | Status: SHIPPED | OUTPATIENT
Start: 2025-03-31

## 2025-04-04 ENCOUNTER — HOSPITAL ENCOUNTER (OUTPATIENT)
Dept: RADIOLOGY | Facility: HOSPITAL | Age: 50
Discharge: HOME | End: 2025-04-04
Attending: INTERNAL MEDICINE
Payer: COMMERCIAL

## 2025-04-04 DIAGNOSIS — R05.9 COUGH, UNSPECIFIED TYPE: ICD-10-CM

## 2025-04-04 PROCEDURE — 71046 X-RAY EXAM CHEST 2 VIEWS: CPT | Mod: 26,,, | Performed by: STUDENT IN AN ORGANIZED HEALTH CARE EDUCATION/TRAINING PROGRAM

## 2025-04-04 PROCEDURE — 71046 X-RAY EXAM CHEST 2 VIEWS: CPT | Mod: TC,PO

## 2025-04-05 ENCOUNTER — RESULTS FOLLOW-UP (OUTPATIENT)
Dept: FAMILY MEDICINE | Facility: CLINIC | Age: 50
End: 2025-04-05
Payer: COMMERCIAL

## 2025-04-07 ENCOUNTER — TELEPHONE (OUTPATIENT)
Dept: FAMILY MEDICINE | Facility: CLINIC | Age: 50
End: 2025-04-07
Payer: COMMERCIAL

## 2025-04-07 NOTE — TELEPHONE ENCOUNTER
Called patient to schedule appointment, patient refusing to schedule requesting a scan be done, he states why do he need a visit? Informed message would be sent to provider.

## 2025-04-14 DIAGNOSIS — G47.00 INSOMNIA, UNSPECIFIED TYPE: ICD-10-CM

## 2025-04-14 RX ORDER — FENOFIBRATE 160 MG/1
160 TABLET ORAL DAILY
Qty: 90 TABLET | Refills: 0 | Status: SHIPPED | OUTPATIENT
Start: 2025-04-14

## 2025-04-14 RX ORDER — ESZOPICLONE 2 MG/1
2 TABLET, FILM COATED ORAL NIGHTLY PRN
Qty: 30 TABLET | Refills: 0 | Status: SHIPPED | OUTPATIENT
Start: 2025-04-14

## 2025-04-14 NOTE — TELEPHONE ENCOUNTER
No care due was identified.  Health [facility] Embedded Care Due Messages. Reference number: [id number].   4/14/2025 10:23:41 AM CDT

## 2025-04-19 NOTE — PROGRESS NOTES
Sergio Parra  MRN:  [id number]  49 y.o. male      Patient Care Team:  Avila Nicholas MD as PCP - General (Internal Medicine)      SUBJECTIVE:     CHIEF COMPLAINT:  - Chronic back pain    HPI:  Mr. Parra reports a history of chronic back pain that has progressively worsened over time. The pain is located to the right side of his back just right of midline, with recent onset of muscle spasms. Pain severity has increased, causing spasms and difficulty with positions. Pain is exacerbated when lying down in bed, often requiring up to 10 minutes to find a comfortable position. He has tried various treatments including heat application and TENS therapy, which provide temporary relief but do not address the underlying issue. Advil does not alleviate his pain.    He had a surgical procedure a few years ago performed by Dr. Jorge Francis, to remove what was initially thought to be a cyst but was later identified as a lipoma. The procedure involved the excision of soft tissue tumors (lipomas), described as being the size of small marbles. This surgery did not resolve his back issues, and symptoms have continued to worsen since then.        Review of Systems   Constitutional: Negative.    Gastrointestinal: Negative.    Genitourinary: Negative.    Musculoskeletal:  Positive for back pain (right lower) and myalgias. Negative for falls, joint pain and neck pain.   Skin: Negative.  Negative for itching and rash.   Neurological:  Negative for dizziness, tingling, tremors, loss of consciousness, weakness and headaches.         Review of patient's allergies indicates:  No Known Allergies        Problem List[1]      Current Outpatient Medications   Medication Instructions    ALPRAZolam (XANAX) 1 MG tablet TAKE 1 TABLET BY MOUTH ONCE DAILY AS NEEDED FOR ANXIETY    atorvastatin (LIPITOR) 20 mg, Oral, Daily    dextroamphetamine-amphetamine (ADDERALL XR) 20 MG 24 hr capsule 20 mg, Oral, Every morning    eszopiclone  "(LUNESTA) 2 mg, Oral, Nightly PRN    fenofibrate 160 mg, Oral, Daily    FLUoxetine 10 mg, Oral, Daily    sildenafiL (VIAGRA) 100 mg, Oral, Daily PRN         Past medical, surgical, family and social histories have been reviewed today.      OBJECTIVE:     Vitals:    04/21/25 1414   BP: (!) 142/78   BP Location: Right arm   Patient Position: Sitting   Pulse: 107   Temp: 96.9 °F (36.1 °C)   TempSrc: Tympanic   SpO2: 99%   Weight: (!) 164.3 kg (362 lb 3.5 oz)   Height: 5' 6" (1.676 m)     Vitals:    04/21/25 1414   PainSc:   4   PainLoc: Back         Physical Exam  Vitals reviewed.   Constitutional:       General: He is not in acute distress.  Musculoskeletal:      Lumbar back: Spasms and tenderness present. No swelling, edema, deformity or bony tenderness. Normal range of motion.        Back:    Neurological:      Mental Status: He is alert.           ASSESSMENT:     1. Chronic back pain, unspecified back location, unspecified back pain laterality ---- chronic intermittent issue, he reports pain persisting since lipoma removal.  Differential include MSK issue, muscle spasms, DJD/arthritis, scar tissue from lipoma removal, or other.  Flexeril as ordered, cautioned on likely sedation.  Back care and pain control discussed.  Steroid shot today.  -     cyclobenzaprine (FLEXERIL) 10 MG tablet; Take 1 tablet (10 mg total) by mouth nightly as needed for Muscle spasms.  Dispense: 30 tablet; Refill: 0  -     dexAMETHasone injection 10 mg    2. Hypertension, unspecified type ---- chronic issue, slightly elevated today.  No HTN medication currently.  Possible elevation due to pain response.  Steroid shot today, advised to closely monitor his home bp over next few days.    Lab Results   Component Value Date     01/29/2025    K 4.2 01/29/2025     01/29/2025    CO2 25 01/29/2025    BUN 17 01/29/2025    CREATININE 0.9 01/29/2025    CALCIUM 10.2 01/29/2025    ANIONGAP 11 01/29/2025    EGFRNORACEVR >60.0 01/29/2025       3. " Type 2 diabetes mellitus with hyperglycemia, without long-term current use of insulin ---- chronic issue, no DM medication currently.  Low-carb/starch diet, no refined sugars, exercise, reduce weight.    Lab Results   Component Value Date    HGBA1C 6.4 (H) 10/23/2024       PLAN:     As above.  May consider referral to Back/Spine specialist or PT.  Follow-up with PCP as scheduled on 5/1/25.  RTC as directed and/or prn.        NAI Kirby  [facility]       30 minutes of total time spent on the encounter, which includes face to face time and non-face to face time preparing to see the patient.  This includes obtaining and/or reviewing separately obtained history, performing a medically appropriate examination and/or evaluation, and counseling and educating the patient/family/caregiver.  Includes documenting clinical information in the electronic or other health record, independently interpreting results (not separately reported) and communicating results to the patient/family/caregiver, with care coordination (not separately reported).  Medications, tests and/or procedures ordered as necessary along with referring and communicating with other health professionals (when not separately reported).           [1]   Patient Active Problem List  Diagnosis    Hypercholesteremia    Anxiety    Class 3 severe obesity due to excess calories with serious comorbidity and body mass index (BMI) of 50.0 to 59.9 in adult    Myofascial pain    Arm weakness-rotator cuff weakness    Chronic neck pain    Chronic back pain    EMILY on CPAP    HTN (hypertension)    Hypertriglyceridemia    Lack of concentration    Cognitive deficits    Sleep apnea    Type 2 diabetes mellitus with hyperglycemia, without long-term current use of insulin    Depression    Anxious depression

## 2025-04-21 ENCOUNTER — OFFICE VISIT (OUTPATIENT)
Dept: FAMILY MEDICINE | Facility: CLINIC | Age: 50
End: 2025-04-21
Payer: COMMERCIAL

## 2025-04-21 VITALS
HEART RATE: 107 BPM | SYSTOLIC BLOOD PRESSURE: 142 MMHG | DIASTOLIC BLOOD PRESSURE: 78 MMHG | WEIGHT: 315 LBS | OXYGEN SATURATION: 99 % | TEMPERATURE: 97 F | HEIGHT: 66 IN | BODY MASS INDEX: 50.62 KG/M2

## 2025-04-21 DIAGNOSIS — G89.29 CHRONIC BACK PAIN, UNSPECIFIED BACK LOCATION, UNSPECIFIED BACK PAIN LATERALITY: Primary | ICD-10-CM

## 2025-04-21 DIAGNOSIS — M54.9 CHRONIC BACK PAIN, UNSPECIFIED BACK LOCATION, UNSPECIFIED BACK PAIN LATERALITY: Primary | ICD-10-CM

## 2025-04-21 DIAGNOSIS — R41.840 LACK OF CONCENTRATION: ICD-10-CM

## 2025-04-21 DIAGNOSIS — R41.89 COGNITIVE DEFICITS: ICD-10-CM

## 2025-04-21 DIAGNOSIS — I10 HYPERTENSION, UNSPECIFIED TYPE: ICD-10-CM

## 2025-04-21 DIAGNOSIS — E11.65 TYPE 2 DIABETES MELLITUS WITH HYPERGLYCEMIA, WITHOUT LONG-TERM CURRENT USE OF INSULIN: ICD-10-CM

## 2025-04-21 PROCEDURE — 99999 PR PBB SHADOW E&M-EST. PATIENT-LVL III: CPT | Mod: PBBFAC,,, | Performed by: REGISTERED NURSE

## 2025-04-21 RX ORDER — DEXTROAMPHETAMINE SACCHARATE, AMPHETAMINE ASPARTATE MONOHYDRATE, DEXTROAMPHETAMINE SULFATE AND AMPHETAMINE SULFATE 5; 5; 5; 5 MG/1; MG/1; MG/1; MG/1
20 CAPSULE, EXTENDED RELEASE ORAL EVERY MORNING
Qty: 30 CAPSULE | Refills: 0 | Status: SHIPPED | OUTPATIENT
Start: 2025-04-21

## 2025-04-21 RX ORDER — DEXAMETHASONE SODIUM PHOSPHATE 10 MG/ML
10 INJECTION INTRAMUSCULAR; INTRAVENOUS
Status: COMPLETED | OUTPATIENT
Start: 2025-04-21 | End: 2025-04-21

## 2025-04-21 RX ORDER — CYCLOBENZAPRINE HCL 10 MG
10 TABLET ORAL NIGHTLY PRN
Qty: 30 TABLET | Refills: 0 | Status: SHIPPED | OUTPATIENT
Start: 2025-04-21

## 2025-04-21 RX ADMIN — DEXAMETHASONE SODIUM PHOSPHATE 10 MG: 10 INJECTION INTRAMUSCULAR; INTRAVENOUS at 03:04

## 2025-04-21 NOTE — TELEPHONE ENCOUNTER
Care Due:                  Date            Visit Type   Department     Provider  --------------------------------------------------------------------------------                                EP -                              PRIMARY      [facility]  Last Visit: 01-      CARE ([facility])   PK Nicholas                              EP -                              PRIMARY      [facility]  Next Visit: 05-      CARE ([facility])   PK Nicholas                                                            Last  Test          Frequency    Reason                     Performed    Due Date  --------------------------------------------------------------------------------    CBC.........  12 months..  fenofibrate..............  03- 03-    Health [facility] Embedded Care Due Messages. Reference number: [id number].   4/21/2025 10:45:25 AM CDT

## 2025-05-01 ENCOUNTER — OFFICE VISIT (OUTPATIENT)
Dept: FAMILY MEDICINE | Facility: CLINIC | Age: 50
End: 2025-05-01
Payer: COMMERCIAL

## 2025-05-01 VITALS
BODY MASS INDEX: 50.62 KG/M2 | WEIGHT: 315 LBS | SYSTOLIC BLOOD PRESSURE: 138 MMHG | HEIGHT: 66 IN | DIASTOLIC BLOOD PRESSURE: 88 MMHG | TEMPERATURE: 97 F | OXYGEN SATURATION: 96 % | HEART RATE: 112 BPM

## 2025-05-01 DIAGNOSIS — G89.29 CHRONIC RIGHT-SIDED THORACIC BACK PAIN: Primary | ICD-10-CM

## 2025-05-01 DIAGNOSIS — M54.6 CHRONIC RIGHT-SIDED THORACIC BACK PAIN: Primary | ICD-10-CM

## 2025-05-01 PROCEDURE — 99213 OFFICE O/P EST LOW 20 MIN: CPT | Mod: S$GLB,,, | Performed by: INTERNAL MEDICINE

## 2025-05-01 PROCEDURE — 99999 PR PBB SHADOW E&M-EST. PATIENT-LVL IV: CPT | Mod: PBBFAC,,, | Performed by: INTERNAL MEDICINE

## 2025-05-01 PROCEDURE — 1159F MED LIST DOCD IN RCRD: CPT | Mod: CPTII,S$GLB,, | Performed by: INTERNAL MEDICINE

## 2025-05-01 PROCEDURE — 3008F BODY MASS INDEX DOCD: CPT | Mod: CPTII,S$GLB,, | Performed by: INTERNAL MEDICINE

## 2025-05-01 PROCEDURE — 3079F DIAST BP 80-89 MM HG: CPT | Mod: CPTII,S$GLB,, | Performed by: INTERNAL MEDICINE

## 2025-05-01 PROCEDURE — 3075F SYST BP GE 130 - 139MM HG: CPT | Mod: CPTII,S$GLB,, | Performed by: INTERNAL MEDICINE

## 2025-05-01 RX ORDER — GABAPENTIN 300 MG/1
300 CAPSULE ORAL NIGHTLY PRN
Qty: 30 CAPSULE | Refills: 3 | Status: SHIPPED | OUTPATIENT
Start: 2025-05-01 | End: 2026-05-01

## 2025-05-01 NOTE — PROGRESS NOTES
Subjective:       Patient ID: Sergio Parra is a 49 y.o. male.    Chief Complaint: Follow-up (3 month, back pain)    Continues with muscle spasms, back pain------right mid thoracic---------steroid shot helped for a few days--------muscle relaxer not helping------still gets spasms after taking it-    Follow-up  Associated symptoms include arthralgias, coughing and myalgias. Pertinent negatives include no abdominal pain, chest pain, chills, diaphoresis, fatigue, fever, headaches, joint swelling, nausea, neck pain, numbness, rash, sore throat, vomiting or weakness.     Past Medical History:   Diagnosis Date    Anxiety     Hypercholesteremia     Obesity      Past Surgical History:   Procedure Laterality Date    ANTERIOR CRUCIATE LIGAMENT REPAIR      FRACTURE SURGERY  1993    Broken Arm     Family History   Problem Relation Name Age of Onset    No Known Problems Mother      No Known Problems Father       Social History[1]  Review of Systems   Constitutional:  Negative for activity change, appetite change, chills, diaphoresis, fatigue, fever and unexpected weight change.   HENT:  Negative for drooling, ear discharge, ear pain, facial swelling, hearing loss, mouth sores, nosebleeds, postnasal drip, rhinorrhea, sinus pressure, sneezing, sore throat, tinnitus, trouble swallowing and voice change.    Eyes:  Negative for photophobia, redness and visual disturbance.   Respiratory:  Positive for cough. Negative for apnea, choking, chest tightness, shortness of breath and wheezing.    Cardiovascular:  Negative for chest pain, palpitations and leg swelling.   Gastrointestinal:  Negative for abdominal distention, abdominal pain, anal bleeding, blood in stool, constipation, diarrhea, nausea and vomiting.   Endocrine: Negative for cold intolerance, heat intolerance, polydipsia, polyphagia and polyuria.   Genitourinary:  Negative for difficulty urinating, dysuria, enuresis, flank pain, frequency, genital sores, hematuria and  urgency.   Musculoskeletal:  Positive for arthralgias, back pain and myalgias. Negative for gait problem, joint swelling, neck pain and neck stiffness.   Skin:  Negative for color change, pallor, rash and wound.   Allergic/Immunologic: Negative for food allergies and immunocompromised state.   Neurological:  Negative for dizziness, tremors, seizures, syncope, facial asymmetry, speech difficulty, weakness, light-headedness, numbness and headaches.   Hematological:  Negative for adenopathy. Does not bruise/bleed easily.   Psychiatric/Behavioral:  Negative for agitation, behavioral problems, confusion, decreased concentration, dysphoric mood, hallucinations, self-injury, sleep disturbance and suicidal ideas. The patient is not nervous/anxious and is not hyperactive.        Objective:      Physical Exam  Vitals and nursing note reviewed.   Constitutional:       General: He is not in acute distress.     Appearance: Normal appearance. He is well-developed. He is not diaphoretic.   HENT:      Head: Normocephalic and atraumatic.      Mouth/Throat:      Pharynx: No oropharyngeal exudate.   Eyes:      General: No scleral icterus.     Pupils: Pupils are equal, round, and reactive to light.   Neck:      Thyroid: No thyromegaly.      Vascular: No carotid bruit or JVD.      Trachea: No tracheal deviation.   Cardiovascular:      Rate and Rhythm: Normal rate and regular rhythm.      Heart sounds: Normal heart sounds.   Pulmonary:      Effort: Pulmonary effort is normal. No respiratory distress.      Breath sounds: Normal breath sounds. No wheezing or rales.   Chest:      Chest wall: No tenderness.   Abdominal:      General: Bowel sounds are normal. There is no distension.      Palpations: Abdomen is soft.      Tenderness: There is no abdominal tenderness. There is no guarding or rebound.   Musculoskeletal:         General: No tenderness. Normal range of motion.      Cervical back: Normal range of motion and neck supple.    Lymphadenopathy:      Cervical: No cervical adenopathy.   Skin:     General: Skin is warm and dry.      Coloration: Skin is not pale.      Findings: No erythema or rash.   Neurological:      Mental Status: He is alert and oriented to person, place, and time.      Cranial Nerves: No cranial nerve deficit.      Coordination: Coordination normal.   Psychiatric:         Behavior: Behavior normal.         Thought Content: Thought content normal.         Judgment: Judgment normal.         CMP  Sodium   Date Value Ref Range Status   01/29/2025 137 136 - 145 mmol/L Final     Potassium   Date Value Ref Range Status   01/29/2025 4.2 3.5 - 5.1 mmol/L Final     Chloride   Date Value Ref Range Status   01/29/2025 101 95 - 110 mmol/L Final     CO2   Date Value Ref Range Status   01/29/2025 25 23 - 29 mmol/L Final     Glucose   Date Value Ref Range Status   01/29/2025 128 (H) 70 - 110 mg/dL Final     BUN   Date Value Ref Range Status   01/29/2025 17 6 - 20 mg/dL Final     Creatinine   Date Value Ref Range Status   01/29/2025 0.9 0.5 - 1.4 mg/dL Final     Calcium   Date Value Ref Range Status   01/29/2025 10.2 8.7 - 10.5 mg/dL Final     Total Protein   Date Value Ref Range Status   01/29/2025 7.4 6.0 - 8.4 g/dL Final     Albumin   Date Value Ref Range Status   01/29/2025 3.8 3.5 - 5.2 g/dL Final     Total Bilirubin   Date Value Ref Range Status   01/29/2025 0.4 0.1 - 1.0 mg/dL Final     Comment:     For infants and newborns, interpretation of results should be based  on gestational age, weight and in agreement with clinical  observations.    Premature Infant recommended reference ranges:  Up to 24 hours.............<8.0 mg/dL  Up to 48 hours............<12.0 mg/dL  3-5 days..................<15.0 mg/dL  6-29 days.................<15.0 mg/dL       Alkaline Phosphatase   Date Value Ref Range Status   01/29/2025 71 40 - 150 U/L Final     AST   Date Value Ref Range Status   01/29/2025 35 10 - 40 U/L Final     ALT   Date Value Ref  Range Status   01/29/2025 42 10 - 44 U/L Final     Anion Gap   Date Value Ref Range Status   01/29/2025 11 8 - 16 mmol/L Final     eGFR if    Date Value Ref Range Status   02/08/2022 >60.0 >60 mL/min/1.73 m^2 Final     eGFR if non    Date Value Ref Range Status   02/08/2022 >60.0 >60 mL/min/1.73 m^2 Final     Comment:     Calculation used to obtain the estimated glomerular filtration  rate (eGFR) is the CKD-EPI equation.        Lab Results   Component Value Date    WBC 7.13 03/12/2024    HGB 17.3 03/12/2024    HCT 52.2 03/12/2024    MCV 94 03/12/2024     03/12/2024     Lab Results   Component Value Date    CHOL 158 01/29/2025     Lab Results   Component Value Date    HDL 35 (L) 01/29/2025     Lab Results   Component Value Date    LDLCALC Invalid, Trig>400.0 01/29/2025     Lab Results   Component Value Date    TRIG 424 (H) 01/29/2025     Lab Results   Component Value Date    CHOLHDL 22.2 01/29/2025     Lab Results   Component Value Date    TSH 3.322 03/12/2024     Lab Results   Component Value Date    HGBA1C 6.4 (H) 10/23/2024     Assessment:       1. Chronic right-sided thoracic back pain        Plan:   Chronic right-sided thoracic back pain-----declines thoracic xray-----------trial gabapentin------------  -     gabapentin (NEURONTIN) 300 MG capsule; Take 1 capsule (300 mg total) by mouth nightly as needed.  Dispense: 30 capsule; Refill: 3  -     Ambulatory referral/consult to Pain Clinic; Future; Expected date: 05/08/2025      As above-------------------------f/u 1 month----------------------------------------       [1]   Social History  Socioeconomic History    Marital status: Single   Tobacco Use    Smoking status: Never    Smokeless tobacco: Never   Substance and Sexual Activity    Alcohol use: Yes     Alcohol/week: 3.0 standard drinks of alcohol     Types: 2 Glasses of wine, 1 Drinks containing 0.5 oz of alcohol per week    Drug use: Never    Sexual activity: Yes      Partners: Female     Birth control/protection: Condom     Social Drivers of Health     Financial Resource Strain: Low Risk  (4/22/2024)    Overall Financial Resource Strain (CARDIA)     Difficulty of Paying Living Expenses: Not very hard   Food Insecurity: No Food Insecurity (4/22/2024)    Hunger Vital Sign     Worried About Running Out of Food in the Last Year: Never true     Ran Out of Food in the Last Year: Never true   Transportation Needs: No Transportation Needs (4/22/2024)    PRAPARE - Transportation     Lack of Transportation (Medical): No     Lack of Transportation (Non-Medical): No   Physical Activity: Unknown (4/22/2024)    Exercise Vital Sign     Days of Exercise per Week: 2 days   Stress: No Stress Concern Present (4/22/2024)    [employer] [place] [employer] - Occupational Stress Questionnaire     Feeling of Stress : Not at all   Housing Stability: Unknown (4/22/2024)    Housing Stability Vital Sign     Unable to Pay for Housing in the Last Year: No

## 2025-05-12 DIAGNOSIS — G47.00 INSOMNIA, UNSPECIFIED TYPE: ICD-10-CM

## 2025-05-12 RX ORDER — ESZOPICLONE 2 MG/1
2 TABLET, FILM COATED ORAL NIGHTLY PRN
Qty: 30 TABLET | Refills: 0 | Status: SHIPPED | OUTPATIENT
Start: 2025-05-12

## 2025-05-12 NOTE — TELEPHONE ENCOUNTER
No care due was identified.  [facility] Embedded Care Due Messages. Reference number: [id number].   5/12/2025 11:11:02 AM CDT

## 2025-05-19 DIAGNOSIS — M54.9 CHRONIC BACK PAIN, UNSPECIFIED BACK LOCATION, UNSPECIFIED BACK PAIN LATERALITY: ICD-10-CM

## 2025-05-19 DIAGNOSIS — G89.29 CHRONIC BACK PAIN, UNSPECIFIED BACK LOCATION, UNSPECIFIED BACK PAIN LATERALITY: ICD-10-CM

## 2025-05-19 DIAGNOSIS — R41.840 LACK OF CONCENTRATION: ICD-10-CM

## 2025-05-19 DIAGNOSIS — R41.89 COGNITIVE DEFICITS: ICD-10-CM

## 2025-05-19 RX ORDER — DEXTROAMPHETAMINE SACCHARATE, AMPHETAMINE ASPARTATE MONOHYDRATE, DEXTROAMPHETAMINE SULFATE AND AMPHETAMINE SULFATE 5; 5; 5; 5 MG/1; MG/1; MG/1; MG/1
20 CAPSULE, EXTENDED RELEASE ORAL EVERY MORNING
Qty: 30 CAPSULE | Refills: 0 | Status: SHIPPED | OUTPATIENT
Start: 2025-05-19

## 2025-05-19 RX ORDER — CYCLOBENZAPRINE HCL 10 MG
10 TABLET ORAL NIGHTLY PRN
Qty: 30 TABLET | Refills: 0 | Status: SHIPPED | OUTPATIENT
Start: 2025-05-19

## 2025-05-19 NOTE — TELEPHONE ENCOUNTER
Refill Routing Note   Medication(s) are not appropriate for processing by [facility] for the following reason(s):        Outside of protocol    [facility] action(s):  Route               Appointments  past 12m or future 3m with PCP    Date Provider   Last Visit   5/1/2025 Avila Nicholas MD   Next Visit   5/29/2025 Avila Nicholas MD   ED visits in past 90 days: 0        Note composed:9:46 AM 05/19/2025

## 2025-05-19 NOTE — TELEPHONE ENCOUNTER
No care due was identified.  Health [facility] Embedded Care Due Messages. Reference number: [id number].   5/19/2025 10:08:40 AM CDT

## 2025-06-01 DIAGNOSIS — M54.6 CHRONIC RIGHT-SIDED THORACIC BACK PAIN: ICD-10-CM

## 2025-06-01 DIAGNOSIS — G89.29 CHRONIC RIGHT-SIDED THORACIC BACK PAIN: ICD-10-CM

## 2025-06-01 DIAGNOSIS — F41.8 ANXIOUS DEPRESSION: ICD-10-CM

## 2025-06-02 RX ORDER — ATORVASTATIN CALCIUM 20 MG/1
20 TABLET, FILM COATED ORAL DAILY
Qty: 90 TABLET | Refills: 2 | Status: SHIPPED | OUTPATIENT
Start: 2025-06-02 | End: 2026-06-02

## 2025-06-02 RX ORDER — FLUOXETINE 10 MG/1
10 CAPSULE ORAL DAILY
Qty: 90 CAPSULE | Refills: 3 | Status: SHIPPED | OUTPATIENT
Start: 2025-06-02 | End: 2026-06-02

## 2025-06-02 RX ORDER — GABAPENTIN 300 MG/1
300 CAPSULE ORAL NIGHTLY PRN
Qty: 30 CAPSULE | Refills: 3 | Status: SHIPPED | OUTPATIENT
Start: 2025-06-02 | End: 2026-06-02

## 2025-06-10 DIAGNOSIS — G89.29 CHRONIC BACK PAIN, UNSPECIFIED BACK LOCATION, UNSPECIFIED BACK PAIN LATERALITY: ICD-10-CM

## 2025-06-10 DIAGNOSIS — G47.00 INSOMNIA, UNSPECIFIED TYPE: ICD-10-CM

## 2025-06-10 DIAGNOSIS — M54.9 CHRONIC BACK PAIN, UNSPECIFIED BACK LOCATION, UNSPECIFIED BACK PAIN LATERALITY: ICD-10-CM

## 2025-06-10 NOTE — TELEPHONE ENCOUNTER
No care due was identified.  Health [facility] Embedded Care Due Messages. Reference number: [id number].   6/10/2025 4:50:03 PM CDT

## 2025-06-11 RX ORDER — SILDENAFIL 100 MG/1
100 TABLET, FILM COATED ORAL DAILY PRN
Qty: 15 TABLET | Refills: 11 | Status: SHIPPED | OUTPATIENT
Start: 2025-06-11 | End: 2026-06-11

## 2025-06-11 RX ORDER — ESZOPICLONE 2 MG/1
2 TABLET, FILM COATED ORAL NIGHTLY PRN
Qty: 30 TABLET | Refills: 0 | Status: SHIPPED | OUTPATIENT
Start: 2025-06-11

## 2025-06-11 RX ORDER — CYCLOBENZAPRINE HCL 10 MG
10 TABLET ORAL NIGHTLY PRN
Qty: 30 TABLET | Refills: 0 | Status: SHIPPED | OUTPATIENT
Start: 2025-06-11

## 2025-06-19 DIAGNOSIS — R41.840 LACK OF CONCENTRATION: ICD-10-CM

## 2025-06-19 DIAGNOSIS — R41.89 COGNITIVE DEFICITS: ICD-10-CM

## 2025-06-19 RX ORDER — DEXTROAMPHETAMINE SACCHARATE, AMPHETAMINE ASPARTATE MONOHYDRATE, DEXTROAMPHETAMINE SULFATE AND AMPHETAMINE SULFATE 5; 5; 5; 5 MG/1; MG/1; MG/1; MG/1
20 CAPSULE, EXTENDED RELEASE ORAL EVERY MORNING
Qty: 30 CAPSULE | Refills: 0 | Status: SHIPPED | OUTPATIENT
Start: 2025-06-19

## 2025-06-19 NOTE — TELEPHONE ENCOUNTER
No care due was identified.  [facility] Embedded Care Due Messages. Reference number: [id number].   6/19/2025 7:58:51 AM CDT

## 2025-07-03 DIAGNOSIS — G89.29 CHRONIC BACK PAIN, UNSPECIFIED BACK LOCATION, UNSPECIFIED BACK PAIN LATERALITY: ICD-10-CM

## 2025-07-03 DIAGNOSIS — G47.00 INSOMNIA, UNSPECIFIED TYPE: ICD-10-CM

## 2025-07-03 DIAGNOSIS — M54.9 CHRONIC BACK PAIN, UNSPECIFIED BACK LOCATION, UNSPECIFIED BACK PAIN LATERALITY: ICD-10-CM

## 2025-07-03 DIAGNOSIS — R41.840 LACK OF CONCENTRATION: ICD-10-CM

## 2025-07-03 DIAGNOSIS — G89.29 CHRONIC RIGHT-SIDED THORACIC BACK PAIN: ICD-10-CM

## 2025-07-03 DIAGNOSIS — R41.89 COGNITIVE DEFICITS: ICD-10-CM

## 2025-07-03 DIAGNOSIS — M54.6 CHRONIC RIGHT-SIDED THORACIC BACK PAIN: ICD-10-CM

## 2025-07-03 DIAGNOSIS — F41.8 ANXIOUS DEPRESSION: ICD-10-CM

## 2025-07-03 RX ORDER — CYCLOBENZAPRINE HCL 10 MG
10 TABLET ORAL NIGHTLY PRN
Qty: 30 TABLET | Refills: 0 | Status: SHIPPED | OUTPATIENT
Start: 2025-07-03

## 2025-07-03 RX ORDER — ATORVASTATIN CALCIUM 20 MG/1
20 TABLET, FILM COATED ORAL DAILY
Qty: 90 TABLET | Refills: 2 | Status: SHIPPED | OUTPATIENT
Start: 2025-07-03 | End: 2026-07-03

## 2025-07-03 RX ORDER — DEXTROAMPHETAMINE SACCHARATE, AMPHETAMINE ASPARTATE MONOHYDRATE, DEXTROAMPHETAMINE SULFATE AND AMPHETAMINE SULFATE 5; 5; 5; 5 MG/1; MG/1; MG/1; MG/1
20 CAPSULE, EXTENDED RELEASE ORAL EVERY MORNING
Qty: 30 CAPSULE | Refills: 0 | OUTPATIENT
Start: 2025-07-03

## 2025-07-03 RX ORDER — ESZOPICLONE 2 MG/1
2 TABLET, FILM COATED ORAL NIGHTLY PRN
Qty: 30 TABLET | Refills: 0 | Status: SHIPPED | OUTPATIENT
Start: 2025-07-03

## 2025-07-03 RX ORDER — GABAPENTIN 300 MG/1
300 CAPSULE ORAL NIGHTLY PRN
Qty: 30 CAPSULE | Refills: 3 | Status: SHIPPED | OUTPATIENT
Start: 2025-07-03 | End: 2026-07-03

## 2025-07-03 RX ORDER — FLUOXETINE 10 MG/1
10 CAPSULE ORAL DAILY
Qty: 90 CAPSULE | Refills: 3 | Status: SHIPPED | OUTPATIENT
Start: 2025-07-03 | End: 2026-07-03

## 2025-07-03 RX ORDER — FENOFIBRATE 160 MG/1
160 TABLET ORAL DAILY
Qty: 90 TABLET | Refills: 0 | Status: SHIPPED | OUTPATIENT
Start: 2025-07-03

## 2025-07-03 NOTE — TELEPHONE ENCOUNTER
Care Due:                  Date            Visit Type   Department     Provider  --------------------------------------------------------------------------------                                EP -                              PRIMARY      [facility]  Last Visit: 05-      CARE ([facility])   MEDICINE       Avila Nicholas  Next Visit: None Scheduled  None         None Found                                                            Last  Test          Frequency    Reason                     Performed    Due Date  --------------------------------------------------------------------------------    CBC.........  12 months..  fenofibrate..............  03- 03-    [facility] Embedded Care Due Messages. Reference number: [id number].   7/03/2025 10:47:12 AM CDT

## 2025-07-14 ENCOUNTER — PATIENT MESSAGE (OUTPATIENT)
Dept: FAMILY MEDICINE | Facility: CLINIC | Age: 50
End: 2025-07-14
Payer: COMMERCIAL

## 2025-07-21 RX ORDER — FENOFIBRATE 160 MG/1
160 TABLET ORAL DAILY
Qty: 90 TABLET | Refills: 0 | Status: SHIPPED | OUTPATIENT
Start: 2025-07-21

## 2025-07-21 NOTE — TELEPHONE ENCOUNTER
No care due was identified.  [facility] Embedded Care Due Messages. Reference number: [id number].   7/21/2025 9:33:27 AM CDT

## 2025-07-22 ENCOUNTER — PATIENT MESSAGE (OUTPATIENT)
Dept: ADMINISTRATIVE | Facility: HOSPITAL | Age: 50
End: 2025-07-22
Payer: COMMERCIAL

## 2025-07-22 DIAGNOSIS — E11.9 TYPE 2 DIABETES MELLITUS WITHOUT COMPLICATION, UNSPECIFIED WHETHER LONG TERM INSULIN USE: ICD-10-CM

## 2025-07-28 DIAGNOSIS — R41.840 LACK OF CONCENTRATION: ICD-10-CM

## 2025-07-28 DIAGNOSIS — R41.89 COGNITIVE DEFICITS: ICD-10-CM

## 2025-07-28 NOTE — TELEPHONE ENCOUNTER
No care due was identified.  [facility] Embedded Care Due Messages. Reference number: [id number].   7/28/2025 7:38:00 AM CDT

## 2025-07-29 RX ORDER — DEXTROAMPHETAMINE SACCHARATE, AMPHETAMINE ASPARTATE MONOHYDRATE, DEXTROAMPHETAMINE SULFATE AND AMPHETAMINE SULFATE 5; 5; 5; 5 MG/1; MG/1; MG/1; MG/1
20 CAPSULE, EXTENDED RELEASE ORAL EVERY MORNING
Qty: 30 CAPSULE | Refills: 0 | Status: SHIPPED | OUTPATIENT
Start: 2025-07-29

## 2025-08-01 DIAGNOSIS — G47.00 INSOMNIA, UNSPECIFIED TYPE: ICD-10-CM

## 2025-08-01 DIAGNOSIS — G89.29 CHRONIC BACK PAIN, UNSPECIFIED BACK LOCATION, UNSPECIFIED BACK PAIN LATERALITY: ICD-10-CM

## 2025-08-01 DIAGNOSIS — M54.6 CHRONIC RIGHT-SIDED THORACIC BACK PAIN: ICD-10-CM

## 2025-08-01 DIAGNOSIS — G89.29 CHRONIC RIGHT-SIDED THORACIC BACK PAIN: ICD-10-CM

## 2025-08-01 DIAGNOSIS — M54.9 CHRONIC BACK PAIN, UNSPECIFIED BACK LOCATION, UNSPECIFIED BACK PAIN LATERALITY: ICD-10-CM

## 2025-08-01 DIAGNOSIS — F41.8 ANXIOUS DEPRESSION: ICD-10-CM

## 2025-08-01 RX ORDER — GABAPENTIN 300 MG/1
300 CAPSULE ORAL NIGHTLY PRN
Qty: 30 CAPSULE | Refills: 3 | Status: SHIPPED | OUTPATIENT
Start: 2025-08-01 | End: 2026-08-01

## 2025-08-01 RX ORDER — FENOFIBRATE 160 MG/1
160 TABLET ORAL DAILY
Qty: 90 TABLET | Refills: 0 | Status: SHIPPED | OUTPATIENT
Start: 2025-08-01

## 2025-08-01 RX ORDER — CYCLOBENZAPRINE HCL 10 MG
10 TABLET ORAL NIGHTLY PRN
Qty: 30 TABLET | Refills: 0 | Status: SHIPPED | OUTPATIENT
Start: 2025-08-01

## 2025-08-01 RX ORDER — ESZOPICLONE 2 MG/1
2 TABLET, FILM COATED ORAL NIGHTLY PRN
Qty: 30 TABLET | Refills: 0 | Status: SHIPPED | OUTPATIENT
Start: 2025-08-01

## 2025-08-01 RX ORDER — FLUOXETINE 10 MG/1
10 CAPSULE ORAL DAILY
Qty: 90 CAPSULE | Refills: 3 | Status: SHIPPED | OUTPATIENT
Start: 2025-08-01 | End: 2026-08-01

## 2025-08-01 NOTE — TELEPHONE ENCOUNTER
No care due was identified.  Health [facility] Embedded Care Due Messages. Reference number: [id number].   8/01/2025 2:09:16 PM CDT

## 2025-08-10 DIAGNOSIS — F41.9 ANXIETY: ICD-10-CM

## 2025-08-10 DIAGNOSIS — G47.00 INSOMNIA, UNSPECIFIED TYPE: ICD-10-CM

## 2025-08-10 RX ORDER — ESZOPICLONE 2 MG/1
2 TABLET, FILM COATED ORAL NIGHTLY PRN
Qty: 30 TABLET | Refills: 0 | Status: CANCELLED | OUTPATIENT
Start: 2025-08-10

## 2025-08-11 RX ORDER — ALPRAZOLAM 1 MG/1
TABLET ORAL
Qty: 30 TABLET | Refills: 3 | Status: SHIPPED | OUTPATIENT
Start: 2025-08-11

## 2025-08-15 ENCOUNTER — PATIENT MESSAGE (OUTPATIENT)
Dept: FAMILY MEDICINE | Facility: CLINIC | Age: 50
End: 2025-08-15
Payer: COMMERCIAL

## 2025-08-15 RX ORDER — PROMETHAZINE HYDROCHLORIDE AND DEXTROMETHORPHAN HYDROBROMIDE 6.25; 15 MG/5ML; MG/5ML
5 SYRUP ORAL 2 TIMES DAILY PRN
Qty: 180 ML | Refills: 0 | Status: SHIPPED | OUTPATIENT
Start: 2025-08-15 | End: 2025-08-25

## 2025-08-19 ENCOUNTER — PATIENT MESSAGE (OUTPATIENT)
Dept: ADMINISTRATIVE | Facility: HOSPITAL | Age: 50
End: 2025-08-19
Payer: COMMERCIAL

## 2025-09-02 DIAGNOSIS — G47.00 INSOMNIA, UNSPECIFIED TYPE: ICD-10-CM

## 2025-09-02 DIAGNOSIS — G89.29 CHRONIC RIGHT-SIDED THORACIC BACK PAIN: ICD-10-CM

## 2025-09-02 DIAGNOSIS — M54.6 CHRONIC RIGHT-SIDED THORACIC BACK PAIN: ICD-10-CM

## 2025-09-02 DIAGNOSIS — R41.840 LACK OF CONCENTRATION: ICD-10-CM

## 2025-09-02 DIAGNOSIS — R41.89 COGNITIVE DEFICITS: ICD-10-CM

## 2025-09-02 RX ORDER — GABAPENTIN 300 MG/1
300 CAPSULE ORAL NIGHTLY PRN
Qty: 30 CAPSULE | Refills: 3 | Status: CANCELLED | OUTPATIENT
Start: 2025-09-02 | End: 2026-09-02

## 2025-09-03 RX ORDER — DEXTROAMPHETAMINE SACCHARATE, AMPHETAMINE ASPARTATE MONOHYDRATE, DEXTROAMPHETAMINE SULFATE AND AMPHETAMINE SULFATE 5; 5; 5; 5 MG/1; MG/1; MG/1; MG/1
20 CAPSULE, EXTENDED RELEASE ORAL EVERY MORNING
Qty: 30 CAPSULE | Refills: 0 | Status: SHIPPED | OUTPATIENT
Start: 2025-09-03

## 2025-09-03 RX ORDER — ESZOPICLONE 2 MG/1
2 TABLET, FILM COATED ORAL NIGHTLY PRN
Qty: 30 TABLET | Refills: 0 | Status: SHIPPED | OUTPATIENT
Start: 2025-09-03